# Patient Record
Sex: FEMALE | Race: WHITE
[De-identification: names, ages, dates, MRNs, and addresses within clinical notes are randomized per-mention and may not be internally consistent; named-entity substitution may affect disease eponyms.]

---

## 2017-04-25 PROBLEM — Z00.00 ENCOUNTER FOR PREVENTIVE HEALTH EXAMINATION: Status: ACTIVE | Noted: 2017-04-25

## 2017-04-28 ENCOUNTER — APPOINTMENT (OUTPATIENT)
Dept: RADIOLOGY | Facility: CLINIC | Age: 82
End: 2017-04-28

## 2017-04-28 ENCOUNTER — OUTPATIENT (OUTPATIENT)
Dept: OUTPATIENT SERVICES | Facility: HOSPITAL | Age: 82
LOS: 1 days | End: 2017-04-28
Payer: MEDICARE

## 2017-04-28 DIAGNOSIS — Z78.0 ASYMPTOMATIC MENOPAUSAL STATE: ICD-10-CM

## 2017-04-28 DIAGNOSIS — M85.89 OTHER SPECIFIED DISORDERS OF BONE DENSITY AND STRUCTURE, MULTIPLE SITES: ICD-10-CM

## 2017-04-28 PROCEDURE — 77080 DXA BONE DENSITY AXIAL: CPT | Mod: 26

## 2017-05-19 ENCOUNTER — APPOINTMENT (OUTPATIENT)
Dept: MRI IMAGING | Facility: CLINIC | Age: 82
End: 2017-05-19

## 2017-05-19 ENCOUNTER — OUTPATIENT (OUTPATIENT)
Dept: OUTPATIENT SERVICES | Facility: HOSPITAL | Age: 82
LOS: 1 days | End: 2017-05-19

## 2017-10-13 ENCOUNTER — APPOINTMENT (OUTPATIENT)
Dept: MRI IMAGING | Facility: CLINIC | Age: 82
End: 2017-10-13
Payer: MEDICARE

## 2017-10-13 ENCOUNTER — OUTPATIENT (OUTPATIENT)
Dept: OUTPATIENT SERVICES | Facility: HOSPITAL | Age: 82
LOS: 1 days | End: 2017-10-13
Payer: MEDICARE

## 2017-10-13 PROCEDURE — 73721 MRI JNT OF LWR EXTRE W/O DYE: CPT | Mod: 26,RT

## 2017-10-13 PROCEDURE — 73721 MRI JNT OF LWR EXTRE W/O DYE: CPT

## 2019-01-19 ENCOUNTER — EMERGENCY (EMERGENCY)
Facility: HOSPITAL | Age: 84
LOS: 1 days | Discharge: HOME | End: 2019-01-19
Admitting: EMERGENCY MEDICINE
Payer: MEDICARE

## 2019-01-19 VITALS
RESPIRATION RATE: 18 BRPM | DIASTOLIC BLOOD PRESSURE: 84 MMHG | TEMPERATURE: 98 F | SYSTOLIC BLOOD PRESSURE: 134 MMHG | OXYGEN SATURATION: 98 % | WEIGHT: 149.03 LBS | HEART RATE: 95 BPM

## 2019-01-19 VITALS
DIASTOLIC BLOOD PRESSURE: 74 MMHG | RESPIRATION RATE: 18 BRPM | HEART RATE: 77 BPM | OXYGEN SATURATION: 98 % | SYSTOLIC BLOOD PRESSURE: 122 MMHG

## 2019-01-19 DIAGNOSIS — Z88.8 ALLERGY STATUS TO OTHER DRUGS, MEDICAMENTS AND BIOLOGICAL SUBSTANCES: ICD-10-CM

## 2019-01-19 DIAGNOSIS — Z91.018 ALLERGY TO OTHER FOODS: ICD-10-CM

## 2019-01-19 DIAGNOSIS — Z88.1 ALLERGY STATUS TO OTHER ANTIBIOTIC AGENTS STATUS: ICD-10-CM

## 2019-01-19 DIAGNOSIS — E03.9 HYPOTHYROIDISM, UNSPECIFIED: ICD-10-CM

## 2019-01-19 DIAGNOSIS — I10 ESSENTIAL (PRIMARY) HYPERTENSION: ICD-10-CM

## 2019-01-19 DIAGNOSIS — R19.7 DIARRHEA, UNSPECIFIED: ICD-10-CM

## 2019-01-19 DIAGNOSIS — R10.32 LEFT LOWER QUADRANT PAIN: ICD-10-CM

## 2019-01-19 LAB
ALBUMIN SERPL ELPH-MCNC: 3.5 G/DL — SIGNIFICANT CHANGE UP (ref 3.4–5)
ALP SERPL-CCNC: 108 U/L — SIGNIFICANT CHANGE UP (ref 40–120)
ALT FLD-CCNC: 28 U/L — SIGNIFICANT CHANGE UP (ref 12–42)
ANION GAP SERPL CALC-SCNC: 9 MMOL/L — SIGNIFICANT CHANGE UP (ref 9–16)
APPEARANCE UR: CLEAR — SIGNIFICANT CHANGE UP
AST SERPL-CCNC: 24 U/L — SIGNIFICANT CHANGE UP (ref 15–37)
BASOPHILS NFR BLD AUTO: 0.7 % — SIGNIFICANT CHANGE UP (ref 0–2)
BILIRUB SERPL-MCNC: 0.5 MG/DL — SIGNIFICANT CHANGE UP (ref 0.2–1.2)
BILIRUB UR-MCNC: NEGATIVE — SIGNIFICANT CHANGE UP
BUN SERPL-MCNC: 19 MG/DL — SIGNIFICANT CHANGE UP (ref 7–23)
CALCIUM SERPL-MCNC: 9 MG/DL — SIGNIFICANT CHANGE UP (ref 8.5–10.5)
CHLORIDE SERPL-SCNC: 108 MMOL/L — SIGNIFICANT CHANGE UP (ref 96–108)
CO2 SERPL-SCNC: 24 MMOL/L — SIGNIFICANT CHANGE UP (ref 22–31)
COLOR SPEC: YELLOW — SIGNIFICANT CHANGE UP
CREAT SERPL-MCNC: 0.78 MG/DL — SIGNIFICANT CHANGE UP (ref 0.5–1.3)
DIFF PNL FLD: NEGATIVE — SIGNIFICANT CHANGE UP
EOSINOPHIL NFR BLD AUTO: 2.1 % — SIGNIFICANT CHANGE UP (ref 0–6)
GLUCOSE SERPL-MCNC: 103 MG/DL — HIGH (ref 70–99)
GLUCOSE UR QL: NEGATIVE — SIGNIFICANT CHANGE UP
HCT VFR BLD CALC: 41 % — SIGNIFICANT CHANGE UP (ref 34.5–45)
HGB BLD-MCNC: 13.2 G/DL — SIGNIFICANT CHANGE UP (ref 11.5–15.5)
IMM GRANULOCYTES NFR BLD AUTO: 0.1 % — SIGNIFICANT CHANGE UP (ref 0–1.5)
KETONES UR-MCNC: NEGATIVE — SIGNIFICANT CHANGE UP
LEUKOCYTE ESTERASE UR-ACNC: NEGATIVE — SIGNIFICANT CHANGE UP
LIDOCAIN IGE QN: 201 U/L — SIGNIFICANT CHANGE UP (ref 73–393)
LYMPHOCYTES # BLD AUTO: 12.9 % — LOW (ref 13–44)
MAGNESIUM SERPL-MCNC: 2 MG/DL — SIGNIFICANT CHANGE UP (ref 1.6–2.6)
MCHC RBC-ENTMCNC: 30.6 PG — SIGNIFICANT CHANGE UP (ref 27–34)
MCHC RBC-ENTMCNC: 32.2 G/DL — SIGNIFICANT CHANGE UP (ref 32–36)
MCV RBC AUTO: 94.9 FL — SIGNIFICANT CHANGE UP (ref 80–100)
MONOCYTES NFR BLD AUTO: 10.4 % — SIGNIFICANT CHANGE UP (ref 2–14)
NEUTROPHILS NFR BLD AUTO: 73.8 % — SIGNIFICANT CHANGE UP (ref 43–77)
NITRITE UR-MCNC: NEGATIVE — SIGNIFICANT CHANGE UP
PH UR: 6.5 — SIGNIFICANT CHANGE UP (ref 5–8)
PLATELET # BLD AUTO: 216 K/UL — SIGNIFICANT CHANGE UP (ref 150–400)
POTASSIUM SERPL-MCNC: 4.1 MMOL/L — SIGNIFICANT CHANGE UP (ref 3.5–5.3)
POTASSIUM SERPL-SCNC: 4.1 MMOL/L — SIGNIFICANT CHANGE UP (ref 3.5–5.3)
PROT SERPL-MCNC: 7.1 G/DL — SIGNIFICANT CHANGE UP (ref 6.4–8.2)
PROT UR-MCNC: NEGATIVE MG/DL — SIGNIFICANT CHANGE UP
RBC # BLD: 4.32 M/UL — SIGNIFICANT CHANGE UP (ref 3.8–5.2)
RBC # FLD: 14.3 % — SIGNIFICANT CHANGE UP (ref 10.3–14.5)
SODIUM SERPL-SCNC: 141 MMOL/L — SIGNIFICANT CHANGE UP (ref 132–145)
SP GR SPEC: 1.01 — SIGNIFICANT CHANGE UP (ref 1–1.03)
UROBILINOGEN FLD QL: 0.2 E.U./DL — SIGNIFICANT CHANGE UP
WBC # BLD: 8.2 K/UL — SIGNIFICANT CHANGE UP (ref 3.8–10.5)
WBC # FLD AUTO: 8.2 K/UL — SIGNIFICANT CHANGE UP (ref 3.8–10.5)

## 2019-01-19 PROCEDURE — 99284 EMERGENCY DEPT VISIT MOD MDM: CPT

## 2019-01-19 RX ORDER — SODIUM CHLORIDE 9 MG/ML
1000 INJECTION INTRAMUSCULAR; INTRAVENOUS; SUBCUTANEOUS ONCE
Qty: 0 | Refills: 0 | Status: COMPLETED | OUTPATIENT
Start: 2019-01-19 | End: 2019-01-19

## 2019-01-19 NOTE — ED PROVIDER NOTE - MEDICAL DECISION MAKING DETAILS
appears to be resolving gastroenteritis, asymptomatic now, well appearing, afebrile, vitals reviewed, abdomen soft nontender nondistended, will check labs, consider CT abdomen if abdominal pain returns or abdominal exam changes/abnormal labs, reassess.

## 2019-01-19 NOTE — ED PROVIDER NOTE - NSFOLLOWUPINSTRUCTIONS_ED_ALL_ED_FT
Follow up with your doctor in 1-2 days  Drink plenty of fluids. Rest  Bread, rice, apple sauce, toast. bland diet, advance slowly as tolerated.     RETURN TO THE EMERGENCY DEPARTMENT FOR WORSENING PAIN, FEVER, VOMITING OR ANY CONCERNS.

## 2019-01-19 NOTE — ED ADULT NURSE NOTE - CHPI ED NUR SYMPTOMS NEG
no vomiting/no diarrhea/no burning urination/no chills/no abdominal distension/no blood in stool/no hematuria/no nausea/no fever/no dysuria

## 2019-01-19 NOTE — ED ADULT NURSE NOTE - OBJECTIVE STATEMENT
82 yo c.o LLQ intermittent discomfort for 36 hours. Pt states "I had diarrhea for a couple weeks after marii and tried to change my diet. I haven't had it in 2 days but I feel like when anything moves in my stomach I feel a discomfort in my LLQ. ". Pt states pain started yesterday and she was hoping it would go away but when she woke u this morning it was still there and was worried because of hx of diverticulitis.  Pt is A&Ox3 at this time with no ss of acute distress. Pt denies n/v/fever/chills, chest pain or sob at this time.

## 2019-01-19 NOTE — ED PROVIDER NOTE - OBJECTIVE STATEMENT
83y F with PMHx diverticulitis, hypothyroidism, HTN, Zantac, osteoporosis, intolerance to flagyl, Cipro, and Augmentin, presents to ED c/o intermittent LLQ pain since yesterday. Pt notes she has had diarrhea for a few weeks (~3 episodes/morning, last episode 36 hours ago), which improved with imodium use. Pt states her symptoms do not feel as bad as her diverticulitis flare-ups, but wanted to be evaluated. No hx of abdominal surgeries. Denies fever/chills, nausea/vomiting, bloody stool, melena, changes in appetite.    Medications: Synthroid, ramipril 10mg, aspirin 81mg, Zantac 150mg bid, Prolia injection, vitamins 83y F with PMHx diverticulitis, hypothyroidism, HTN, osteoporosis, Conway's esophagus, intolerance to flagyl, Cipro, and Augmentin, presents to ED c/o intermittent LLQ pain since yesterday. Pt notes she has had diarrhea for a few weeks (~3 episodes/morning, last episode 36 hours ago), which improved with imodium use. Pt states her symptoms do not feel as bad as her diverticulitis flare-ups, but wanted to be evaluated. No hx of abdominal surgeries. Denies fever/chills, nausea/vomiting, bloody stool, melena, changes in appetite.    Medications: Synthroid, ramipril 10mg, aspirin 81mg, Zantac 150mg bid, Prolia injection, vitamins 83y F with PMHx diverticulitis in the past, hypothyroidism, HTN, osteoporosis, Conway's esophagus, intolerance to flagyl, Cipro, and Augmentin, presents to ED c/o mild LLQ pain yesterday that has since resolved. Pt notes she had mild watery diarrhea for 2 weeks, resolved 36 hours ago. Pt states her symptoms do not feel as severe as diverticulitis in the past but wanted to be evaluated. No hx of abdominal surgeries. Denies fever/chills, nausea/vomiting, bloody stool, melena or poor appetite. No recent antibiotic use or recent travel. Used to work as RN at [employer]    Medications: Synthroid, ramipril 10mg, aspirin 81mg, Zantac 150mg bid, Prolia injection, vitamins

## 2019-01-19 NOTE — ED PROVIDER NOTE - PROGRESS NOTE DETAILS
patient feeling better, tolerating PO, labs unremarkable, afebrile, normal white count, tolerating PO.   probable resolving gastroenteritis, patient wishing to hold off on CT abdomen at this time which is reasonable as diarrhea resolving with no abdominal pain anymore, possibly resolving viral gastroenteritis, discussed at length strict return precautions including worsening abdominal pain, vomiting, fever or any concerns, she will follow up with her pmd in 1-2 days, will dc

## 2019-01-19 NOTE — ED PROVIDER NOTE - PHYSICAL EXAMINATION
VITAL SIGNS: I have reviewed nursing notes and confirm.  CONSTITUTIONAL: Well-developed; well-nourished; in no acute distress.  SKIN: Skin is warm and dry, no acute rash.  HEAD: Normocephalic; atraumatic.  EYES: PERRL, EOM intact; conjunctiva and sclera clear.  ENT: No nasal discharge; airway clear.  NECK: Supple; non tender.  CARD: S1, S2 normal; no murmurs, gallops, or rubs. Regular rate and rhythm.  RESP: No wheezes, rales or rhonchi.  ABD: Normal bowel sounds; soft; non-distended; non-tender; no hepatosplenomegaly.  EXT: Normal ROM. No clubbing, cyanosis or edema.  NEURO: Alert, oriented. Grossly unremarkable.  PSYCH: Cooperative, appropriate. VITAL SIGNS: I have reviewed nursing notes and confirm.  CONSTITUTIONAL: Well-developed; well-nourished; in no acute distress.  SKIN: Skin is warm and dry, no acute rash.  HEAD: Normocephalic; atraumatic.  EYES: PERRL, EOM intact; conjunctiva and sclera clear.  ENT: No nasal discharge; airway clear.  NECK: Supple; non tender.  CARD: S1, S2 normal; no murmurs, gallops, or rubs. Regular rate and rhythm.  RESP: No wheezes, rales or rhonchi.  ABD: Normal bowel sounds; soft; non-distended; non-tender. no cvat bilaterally  EXT: Normal ROM x 4.  NEURO: Alert, oriented. Grossly unremarkable.  PSYCH: Cooperative, appropriate.

## 2019-11-19 ENCOUNTER — EMERGENCY (EMERGENCY)
Facility: HOSPITAL | Age: 84
LOS: 1 days | Discharge: HOME | End: 2019-11-19
Attending: EMERGENCY MEDICINE | Admitting: EMERGENCY MEDICINE
Payer: MEDICARE

## 2019-11-19 VITALS
HEART RATE: 94 BPM | SYSTOLIC BLOOD PRESSURE: 138 MMHG | RESPIRATION RATE: 17 BRPM | DIASTOLIC BLOOD PRESSURE: 77 MMHG | WEIGHT: 147.93 LBS | HEIGHT: 65 IN | TEMPERATURE: 98 F | OXYGEN SATURATION: 96 %

## 2019-11-19 DIAGNOSIS — Z98.41 CATARACT EXTRACTION STATUS, RIGHT EYE: Chronic | ICD-10-CM

## 2019-11-19 PROCEDURE — 70450 CT HEAD/BRAIN W/O DYE: CPT | Mod: 26

## 2019-11-19 PROCEDURE — 73564 X-RAY EXAM KNEE 4 OR MORE: CPT | Mod: 26,LT

## 2019-11-19 PROCEDURE — 99284 EMERGENCY DEPT VISIT MOD MDM: CPT

## 2019-11-19 PROCEDURE — 73080 X-RAY EXAM OF ELBOW: CPT | Mod: 26,LT

## 2019-11-19 PROCEDURE — 71101 X-RAY EXAM UNILAT RIBS/CHEST: CPT | Mod: 26,LT

## 2019-11-19 RX ORDER — OXYCODONE AND ACETAMINOPHEN 5; 325 MG/1; MG/1
1 TABLET ORAL ONCE
Refills: 0 | Status: DISCONTINUED | OUTPATIENT
Start: 2019-11-19 | End: 2019-11-19

## 2019-11-19 RX ORDER — ACETAMINOPHEN 500 MG
975 TABLET ORAL ONCE
Refills: 0 | Status: COMPLETED | OUTPATIENT
Start: 2019-11-19 | End: 2019-11-19

## 2019-11-19 RX ORDER — AZTREONAM 2 G
1 VIAL (EA) INJECTION
Qty: 14 | Refills: 0
Start: 2019-11-19 | End: 2019-11-25

## 2019-11-19 RX ADMIN — OXYCODONE AND ACETAMINOPHEN 1 TABLET(S): 5; 325 TABLET ORAL at 19:29

## 2019-11-19 RX ADMIN — Medication 975 MILLIGRAM(S): at 18:20

## 2019-11-19 RX ADMIN — Medication 975 MILLIGRAM(S): at 17:02

## 2019-11-19 NOTE — ED PROVIDER NOTE - SKIN, MLM
+Superficial abrasion over the left knee. no active bleeding. +ecchymosis at medial conidial. +Abrasion over left olecranon.
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
## 2019-11-19 NOTE — ED PROVIDER NOTE - DIAGNOSTIC INTERPRETATION
XR L knee no acute fracture, (+) mild soft tissue swelling noted, normal bony alignment (+) OA   XR L elbow no acute fracture, no soft tissue swelling noted, normal bony alignment (+) OA   CXR no acute infiltrate, heart shadow normal, bony structures intact  XR L ribs no acute fracture, normal bony alignment
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
## 2019-11-19 NOTE — ED PROVIDER NOTE - PATIENT PORTAL LINK FT
You can access the FollowMyHealth Patient Portal offered by [facility] by registering at the following website: [email or web address]. By joining [employer]’s FollowMyHealth portal, you will also be able to view your health information using other applications (apps) compatible with our system.

## 2019-11-19 NOTE — ED PROVIDER NOTE - CARE PLAN
Principal Discharge DX:	Accidental fall, initial encounter  Secondary Diagnosis:	Knee contusion  Secondary Diagnosis:	Elbow contusion  Secondary Diagnosis:	Minor head injury, initial encounter

## 2019-11-19 NOTE — ED ADULT NURSE NOTE - NSIMPLEMENTINTERV_GEN_ALL_ED
Implemented All Universal Safety Interventions:  [place] to call system. Call bell, personal items and telephone within reach. Instruct patient to call for assistance. Room bathroom lighting operational. Non-slip footwear when patient is off stretcher. Physically safe environment: no spills, clutter or unnecessary equipment. Stretcher in lowest position, wheels locked, appropriate side rails in place.

## 2019-11-19 NOTE — ED PROVIDER NOTE - MUSCULOSKELETAL, MLM
Spine appears normal, range of motion is not limited, +tenderness on the left 8th rib. ROM of extremities intact.

## 2019-11-19 NOTE — ED PROVIDER NOTE - NSFOLLOWUPINSTRUCTIONS_ED_ALL_ED_FT
Closed Head Injury    A closed head injury is an injury to your head that may or may not involve a traumatic brain injury (TBI). Symptoms of TBI can be short or long lasting and include headache, dizziness, interference with memory or speech, fatigue, confusion, changes in sleep, mood changes, nausea, depression/anxiety, and dulling of senses. Make sure to obtain proper rest which includes getting plenty of sleep, avoiding excessive visual stimulation, and avoiding activities that may cause physical or mental stress. Avoid any situation where there is potential for another head injury, including sports.    SEEK IMMEDIATE MEDICAL CARE IF YOU HAVE ANY OF THE FOLLOWING SYMPTOMS: unusual drowsiness, vomiting, severe dizziness, seizures, lightheadedness, muscular weakness, different pupil sizes, visual changes, or clear or bloody discharge from your ears or nose.    Contusion    A contusion is a deep bruise. Contusions are the result of a blunt injury to tissues and muscle fibers under the skin. The skin overlying the contusion may turn blue, purple, or yellow. Symptoms also include pain and swelling in the injured area.    SEEK IMMEDIATE MEDICAL CARE IF YOU HAVE ANY OF THE FOLLOWING SYMPTOMS: severe pain, numbness, tingling, pain, weakness, or skin color/temperature change in any part of your body distal to the injury.

## 2019-11-19 NOTE — ED PROVIDER NOTE - PMH
Conway's esophagus    Depression    Diverticulitis    HTN (hypertension)    Hypothyroidism    Osteoporosis

## 2019-11-20 PROBLEM — E03.9 HYPOTHYROIDISM, UNSPECIFIED: Chronic | Status: ACTIVE | Noted: 2019-01-19

## 2019-11-20 PROBLEM — I10 ESSENTIAL (PRIMARY) HYPERTENSION: Chronic | Status: ACTIVE | Noted: 2019-01-19
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
## 2019-11-24 DIAGNOSIS — I10 ESSENTIAL (PRIMARY) HYPERTENSION: ICD-10-CM

## 2019-11-24 DIAGNOSIS — S80.02XA CONTUSION OF LEFT KNEE, INITIAL ENCOUNTER: ICD-10-CM
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
## 2019-11-24 DIAGNOSIS — S09.90XA UNSPECIFIED INJURY OF HEAD, INITIAL ENCOUNTER: ICD-10-CM

## 2019-11-24 DIAGNOSIS — S50.02XA CONTUSION OF LEFT ELBOW, INITIAL ENCOUNTER: ICD-10-CM
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
## 2019-11-24 DIAGNOSIS — R07.81 PLEURODYNIA: ICD-10-CM

## 2019-11-27 NOTE — ED POST DISCHARGE NOTE - DETAILS
pt reports she is still uncomfortable but it is improving significantly and she has been using lidocaine patches. denies any sob. Patient called asking for her xray report and after her identity was verified, she was provided with the information

## 2019-11-27 NOTE — ED POST DISCHARGE NOTE - ADDITIONAL DOCUMENTATION
additionally recommended pt follow up with her pmd with repeat imaging as recommended by radiologist regarding the lung nodules.

## 2020-10-19 NOTE — ED ADULT NURSE NOTE - NSFALLRSKPASTHIST_ED_ALL_ED
Subjective   Sara Solis is a 79 y.o. female. Breast pain    History of Present Illness   Patient presents today for breast pain. Breast pain is located in right breast. Patient says she has been having this pain for few few months. Pain occurs intermittently. She had mammogram and ultrasound which were normal in the past two months. She denies any breast lump, adenopathy, breast dimpling, or nipple discharge/bleeding. Today she denies any breast pain.     The following portions of the patient's history were reviewed and updated as appropriate: allergies, current medications, past family history, past medical history, past social history, past surgical history and problem list.    Review of Systems   Constitutional: Negative for chills, fatigue and fever.   HENT: Negative for congestion, ear pain, rhinorrhea and sore throat.    Eyes: Negative for blurred vision, double vision and visual disturbance.   Respiratory: Negative for cough, chest tightness, shortness of breath and wheezing.    Cardiovascular: Negative for chest pain, palpitations and leg swelling.   Gastrointestinal: Negative for abdominal pain, diarrhea, nausea and vomiting.   Endocrine: Negative for cold intolerance and heat intolerance.   Genitourinary: Positive for breast pain. Negative for difficulty urinating, dysuria, frequency and hematuria.   Musculoskeletal: Negative for arthralgias, back pain, neck pain and neck stiffness.   Skin: Negative for dry skin, pallor, rash, skin lesions and bruise.   Allergic/Immunologic: Negative for environmental allergies, food allergies and immunocompromised state.   Neurological: Negative for dizziness, syncope, weakness, light-headedness, headache and confusion.   Hematological: Negative for adenopathy. Does not bruise/bleed easily.   Psychiatric/Behavioral: Negative for self-injury, sleep disturbance, suicidal ideas, depressed mood and stress. The patient is not nervous/anxious.        Objective    Physical Exam  Constitutional:       Appearance: She is well-developed.   HENT:      Head: Normocephalic.      Right Ear: External ear normal.      Left Ear: External ear normal.      Nose: Nose normal.      Mouth/Throat:      Pharynx: No oropharyngeal exudate.   Eyes:      Conjunctiva/sclera: Conjunctivae normal.      Pupils: Pupils are equal, round, and reactive to light.   Neck:      Musculoskeletal: Normal range of motion and neck supple.      Thyroid: No thyromegaly.   Cardiovascular:      Rate and Rhythm: Normal rate and regular rhythm.   Pulmonary:      Effort: Pulmonary effort is normal.      Breath sounds: Normal breath sounds.   Chest:      Breasts:         Right: Normal. No swelling, bleeding, inverted nipple, mass, nipple discharge, skin change or tenderness.         Left: Normal. No swelling, bleeding, inverted nipple, mass, nipple discharge, skin change or tenderness.      Comments: Breast exam performed.  Musculoskeletal: Normal range of motion.   Lymphadenopathy:      Upper Body:      Right upper body: No supraclavicular, axillary or pectoral adenopathy.      Left upper body: No supraclavicular, axillary or pectoral adenopathy.   Skin:     General: Skin is warm and dry.   Neurological:      Mental Status: She is alert and oriented to person, place, and time.   Psychiatric:         Behavior: Behavior normal.         Thought Content: Thought content normal.         Judgment: Judgment normal.       Vitals:    10/19/20 1000   BP: 144/62   Pulse: 82   Temp: 97.3 °F (36.3 °C)   SpO2: 97%         Assessment/Plan   Diagnoses and all orders for this visit:    1. Breast pain (Primary)  -     vitamin E (vitamin E) 400 UNIT capsule; Take 1 capsule by mouth 2 (Two) Times a Day.  Dispense: 60 capsule; Refill: 0    Breast exam unremarkable. Pt had tenderness around 9 o'clock to 12 o'clock area, no lump, dimpling noted. Pt instructed to stop drinking all caffeine, including eating chocolate, ice area PRN for pain,  tylenol as directed PRN and take vitamin E 400 units BID for one month. At one month she was instructed to call and let me know how she is doing. If still no better or worse, I want to order more imaging.  If symptoms do not improve or worsen, patient was instructed to return to clinic for further evaluation.         This document has been electronically signed by DEBORAH Garg on  October 19, 2020 10:35 CDT              yes

## 2023-02-20 NOTE — ED PROVIDER NOTE - CROS ED RESP ALL NEG
Virtual Telephone Check-In    Cecily Castano verbally  a Virtual/Telephone Check-In visit on 02/20/23. Patient has been referred to the [facility] website at [email or web address] to review the yearly Consent to Treat document. Patient understands and accepts financial responsibility for any deductible, co-insurance and/or co-pays associated with this service.     Duration of the service:  5 minutes      Summary of topics discussed:             Kassidy Austin MD
negative...

## 2024-01-22 ENCOUNTER — INPATIENT (INPATIENT)
Facility: HOSPITAL | Age: 89
LOS: 7 days | Discharge: NURSING, REHAB OR LONG-TERM CARE | DRG: 871 | End: 2024-01-30
Attending: STUDENT IN AN ORGANIZED HEALTH CARE EDUCATION/TRAINING PROGRAM | Admitting: INTERNAL MEDICINE
Payer: MEDICARE

## 2024-01-22 VITALS
SYSTOLIC BLOOD PRESSURE: 135 MMHG | RESPIRATION RATE: 18 BRPM | DIASTOLIC BLOOD PRESSURE: 75 MMHG | OXYGEN SATURATION: 97 % | HEART RATE: 90 BPM

## 2024-01-22 DIAGNOSIS — Z98.41 CATARACT EXTRACTION STATUS, RIGHT EYE: Chronic | ICD-10-CM

## 2024-01-22 LAB
ALBUMIN SERPL ELPH-MCNC: 2.7 G/DL — LOW (ref 3.4–5)
ALP SERPL-CCNC: 129 U/L — HIGH (ref 40–120)
ALT FLD-CCNC: 56 U/L — HIGH (ref 12–42)
ANION GAP SERPL CALC-SCNC: 16 MMOL/L — SIGNIFICANT CHANGE UP (ref 9–16)
AST SERPL-CCNC: 216 U/L — HIGH (ref 15–37)
BASOPHILS # BLD AUTO: 0.09 K/UL — SIGNIFICANT CHANGE UP (ref 0–0.2)
BASOPHILS NFR BLD AUTO: 0.3 % — SIGNIFICANT CHANGE UP (ref 0–2)
BILIRUB SERPL-MCNC: 1.2 MG/DL — SIGNIFICANT CHANGE UP (ref 0.2–1.2)
BUN SERPL-MCNC: 51 MG/DL — HIGH (ref 7–23)
CALCIUM SERPL-MCNC: 9 MG/DL — SIGNIFICANT CHANGE UP (ref 8.5–10.5)
CHLORIDE SERPL-SCNC: 114 MMOL/L — HIGH (ref 96–108)
CK SERPL-CCNC: 4434 U/L — HIGH (ref 26–192)
CO2 SERPL-SCNC: 19 MMOL/L — LOW (ref 22–31)
CREAT SERPL-MCNC: 1.28 MG/DL — SIGNIFICANT CHANGE UP (ref 0.5–1.3)
D DIMER BLD IA.RAPID-MCNC: 1148 NG/ML DDU — HIGH
EGFR: 40 ML/MIN/1.73M2 — LOW
EOSINOPHIL # BLD AUTO: 0 K/UL — SIGNIFICANT CHANGE UP (ref 0–0.5)
EOSINOPHIL NFR BLD AUTO: 0 % — SIGNIFICANT CHANGE UP (ref 0–6)
FLUAV AG NPH QL: SIGNIFICANT CHANGE UP
FLUBV AG NPH QL: SIGNIFICANT CHANGE UP
GIANT PLATELETS BLD QL SMEAR: PRESENT — SIGNIFICANT CHANGE UP
GLUCOSE BLDC GLUCOMTR-MCNC: 104 MG/DL — HIGH (ref 70–99)
GLUCOSE SERPL-MCNC: 123 MG/DL — HIGH (ref 70–99)
HCT VFR BLD CALC: 44 % — SIGNIFICANT CHANGE UP (ref 34.5–45)
HGB BLD-MCNC: 14.1 G/DL — SIGNIFICANT CHANGE UP (ref 11.5–15.5)
IMM GRANULOCYTES NFR BLD AUTO: 0.9 % — SIGNIFICANT CHANGE UP (ref 0–0.9)
LACTATE BLDV-MCNC: 3.2 MMOL/L — HIGH (ref 0.5–2)
LACTATE BLDV-MCNC: 3.7 MMOL/L — HIGH (ref 0.5–2)
LG PLATELETS BLD QL AUTO: SLIGHT — SIGNIFICANT CHANGE UP
LYMPHOCYTES # BLD AUTO: 0.88 K/UL — LOW (ref 1–3.3)
LYMPHOCYTES # BLD AUTO: 3.1 % — LOW (ref 13–44)
MANUAL SMEAR VERIFICATION: SIGNIFICANT CHANGE UP
MCHC RBC-ENTMCNC: 32 GM/DL — SIGNIFICANT CHANGE UP (ref 32–36)
MCHC RBC-ENTMCNC: 32.2 PG — SIGNIFICANT CHANGE UP (ref 27–34)
MCV RBC AUTO: 100.5 FL — HIGH (ref 80–100)
MICROCYTES BLD QL: SLIGHT — SIGNIFICANT CHANGE UP
MONOCYTES # BLD AUTO: 1.68 K/UL — HIGH (ref 0–0.9)
MONOCYTES NFR BLD AUTO: 6 % — SIGNIFICANT CHANGE UP (ref 2–14)
NEUTROPHILS # BLD AUTO: 25.19 K/UL — HIGH (ref 1.8–7.4)
NEUTROPHILS NFR BLD AUTO: 89.7 % — HIGH (ref 43–77)
NRBC # BLD: 0 /100 WBCS — SIGNIFICANT CHANGE UP (ref 0–0)
NT-PROBNP SERPL-SCNC: 2629 PG/ML — HIGH
PLAT MORPH BLD: ABNORMAL
PLATELET # BLD AUTO: 234 K/UL — SIGNIFICANT CHANGE UP (ref 150–400)
POTASSIUM SERPL-MCNC: 3.9 MMOL/L — SIGNIFICANT CHANGE UP (ref 3.5–5.3)
POTASSIUM SERPL-SCNC: 3.9 MMOL/L — SIGNIFICANT CHANGE UP (ref 3.5–5.3)
PROT SERPL-MCNC: 6.4 G/DL — SIGNIFICANT CHANGE UP (ref 6.4–8.2)
RBC # BLD: 4.38 M/UL — SIGNIFICANT CHANGE UP (ref 3.8–5.2)
RBC # FLD: 15.9 % — HIGH (ref 10.3–14.5)
RBC BLD AUTO: ABNORMAL
RSV RNA NPH QL NAA+NON-PROBE: SIGNIFICANT CHANGE UP
SARS-COV-2 RNA SPEC QL NAA+PROBE: SIGNIFICANT CHANGE UP
SODIUM SERPL-SCNC: 149 MMOL/L — HIGH (ref 132–145)
TROPONIN I, HIGH SENSITIVITY RESULT: 190.5 NG/L — HIGH
TROPONIN I, HIGH SENSITIVITY RESULT: 212.6 NG/L — HIGH
TSH SERPL-MCNC: 8.73 UIU/ML — HIGH (ref 0.36–3.74)
WBC # BLD: 28.09 K/UL — HIGH (ref 3.8–10.5)
WBC # FLD AUTO: 28.09 K/UL — HIGH (ref 3.8–10.5)

## 2024-01-22 PROCEDURE — 71275 CT ANGIOGRAPHY CHEST: CPT | Mod: 26,MH

## 2024-01-22 PROCEDURE — 73700 CT LOWER EXTREMITY W/O DYE: CPT | Mod: 26,50,MH

## 2024-01-22 PROCEDURE — 74176 CT ABD & PELVIS W/O CONTRAST: CPT | Mod: 26,MH

## 2024-01-22 PROCEDURE — 99291 CRITICAL CARE FIRST HOUR: CPT

## 2024-01-22 PROCEDURE — 72125 CT NECK SPINE W/O DYE: CPT | Mod: 26,MH

## 2024-01-22 PROCEDURE — 71250 CT THORAX DX C-: CPT | Mod: 26,59,MH

## 2024-01-22 PROCEDURE — 70450 CT HEAD/BRAIN W/O DYE: CPT | Mod: 26,MH

## 2024-01-22 RX ORDER — METOPROLOL TARTRATE 50 MG
2.5 TABLET ORAL ONCE
Refills: 0 | Status: DISCONTINUED | OUTPATIENT
Start: 2024-01-22 | End: 2024-01-24

## 2024-01-22 RX ORDER — SODIUM CHLORIDE 9 MG/ML
1000 INJECTION INTRAMUSCULAR; INTRAVENOUS; SUBCUTANEOUS ONCE
Refills: 0 | Status: COMPLETED | OUTPATIENT
Start: 2024-01-22 | End: 2024-01-22

## 2024-01-22 RX ORDER — ASPIRIN/CALCIUM CARB/MAGNESIUM 324 MG
324 TABLET ORAL ONCE
Refills: 0 | Status: COMPLETED | OUTPATIENT
Start: 2024-01-22 | End: 2024-01-22

## 2024-01-22 RX ADMIN — Medication 0.5 MILLIGRAM(S): at 22:08

## 2024-01-22 RX ADMIN — SODIUM CHLORIDE 1000 MILLILITER(S): 9 INJECTION INTRAMUSCULAR; INTRAVENOUS; SUBCUTANEOUS at 22:06

## 2024-01-22 RX ADMIN — Medication 324 MILLIGRAM(S): at 19:27

## 2024-01-22 RX ADMIN — SODIUM CHLORIDE 250 MILLILITER(S): 9 INJECTION INTRAMUSCULAR; INTRAVENOUS; SUBCUTANEOUS at 18:25

## 2024-01-22 NOTE — ED ADULT TRIAGE NOTE - CHIEF COMPLAINT QUOTE
pt. found lying on her apartment, as per EMS a friend in the building had not heard from her and went to check on her. Pt. reports she was reaching for something on her floor, fell and could not get up, unclear when the fall was and she is unable to elaborate. Pt. noted to have bruising to the right hip, left hand/wrist, as well as bruising in other various parts of her body. Unable to obtain temp in triage pt. can not tolerate.

## 2024-01-22 NOTE — ED ADULT NURSE NOTE - OBJECTIVE STATEMENT
pt. found lying on her apartment, as per EMS a friend in the building had not heard from her and went to check on her. Pt. reports she was reaching for something on her floor, fell and could not get up, unclear when the fall was and she is unable to elaborate. Pt. noted to have bruising to the right hip, left hand/wrist, as well as bruising in other various parts of her body.

## 2024-01-22 NOTE — PROVIDER CONTACT NOTE (EICU) - ASSESSMENT
Awake alert responsive, answers questions but not entirely appropriately. Protecting airway.  Pertinent labs include Na 149 (>1.5L free water deficit) with hyperchloremic metabolic acidosis (Cl 114 bicarb 19) and KASEY (51/1.28). mild transaminitis, first troponin positive (212) and elevation in CPK (4434)

## 2024-01-22 NOTE — ED PROVIDER NOTE - NEUROLOGICAL, MLM
awake speech is slow and approprate follows commands able to hld left bar of bed with right arm and pull self to left side of body for exam of back motor 4/5 all extremities cn 3-8 normal  and oriented, no focal deficits, no motor or sensory deficits.

## 2024-01-22 NOTE — ED PROVIDER NOTE - NS ED MD DISPO ISOLATION TYPES
Nosebleeds Normal Treatment: I explained this is common when taking isotretinoin. I recommended saline mist in each nostril multiple times a day. If this worsens they will contact us. None

## 2024-01-22 NOTE — ED ADULT NURSE REASSESSMENT NOTE - NS ED NURSE REASSESS COMMENT FT1
Patient to ER bed 5 to gown for evaluation. Side rails up. pt remains on CCM and pulse oximetry. resting in stretcher, even rise and fall of chest. NAD, family members arrive, pt's niece and nephew from [place].

## 2024-01-22 NOTE — ED PROVIDER NOTE - SKIN, MLM
Skin normal color for race, warm, dry and intact. multiple ecchymosis to extremities right hip lower back arms that appears 2 + days old  no signs of ulceration

## 2024-01-22 NOTE — ED PROVIDER NOTE - OBJECTIVE STATEMENT
84 y o female with PMHx of glaucoma (Latanoprost), hypothyroidism, depression, HTN (Lamapril), ASA 81mg QOD, osteoporosis, and hx of Right eye cataract history as per EMS, pt and old gv charts reviewed    84 y o female with PMHx of diverticulitis, jensen's esophagus,  glaucoma (Latanoprost), hypothyroidism, depression, HTN (Lamapril), - ems called by superintendant - as it was notted that patient had not picked up newspapers x 2 days. super found pt awake lying on the floor of her apt generally to wek to get off floor, EMS found patient in smialr condition and trasnportend her to ER. Pt lives alone.     The patient denies the following symptoms:  headache, dizziness/lightheadedness, neck pain/neck stiffness, numbness/tingling, photophobia, change in vision/hearing/gait/mental status/speech,difficulty swallowing, focal weakness,, fever, chills, chest/neck/jaw/arm or back pain, palpitations, shortness of breath, diaphoresis, swelling to arm and/or legs, N/V/D, abdominal pain, abdominal distention, back pain, flank pain, EMERGENCY CONTACT SHANICE HANEY   [phone]     history as per EMS, pt and old [facility] charts reviewed    84 y o female with PMHx of diverticulitis, jensen's esophagus,  glaucoma (Latanoprost), hypothyroidism, depression, HTN (Lamapril), - ems called by superintendant - as it was notted that patient had not picked up newspapers x 2 days. super found pt awake lying on the floor of her apt generally to wek to get off floor, EMS found patient in smialr condition and trasnportend her to ER. Pt lives alone.     The patient denies the following symptoms:  headache, dizziness/lightheadedness, neck pain/neck stiffness, numbness/tingling, photophobia, change in vision/hearing/gait/mental status/speech,difficulty swallowing, focal weakness,, fever, chills, chest/neck/jaw/arm or back pain, palpitations, shortness of breath, diaphoresis, swelling to arm and/or legs, N/V/D, abdominal pain, abdominal distention, back pain, flank pain, EMERGENCY CONTACT SHANICE HANEY   [phone]     history as per EMS, pt and old [facility] charts reviewed    84 y o female with PMHx of diverticulitis, jensen's esophagus,  glaucoma (Latanoprost), hypothyroidism, depression, HTN (Lamapril), - ems called by superintendant - as it was noted that patient had not picked up newspapers x 2 days. super found pt awake lying on the floor of her apt generally to weak to get off floor, EMS found patient in similar condition and transported her to ER. Pt lives alone.     The patient denies the following symptoms:  headache, dizziness/lightheadedness, neck pain/neck stiffness, numbness/tingling, photophobia, change in vision/hearing/gait/mental status/speech,difficulty swallowing, focal weakness,, fever, chills, chest/neck/jaw/arm or back pain, palpitations, shortness of breath, diaphoresis, swelling to arm and/or legs, N/V/D, abdominal pain, abdominal distention, back pain, flank pain,

## 2024-01-22 NOTE — ED PROVIDER NOTE - CLINICAL SUMMARY MEDICAL DECISION MAKING FREE TEXT BOX
84 y o female with PMHx of diverticulitis, jensen's esophagus,  glaucoma (Latanoprost), hypothyroidism, depression, HTN (Lamapril), - ems called by superintendant - as it was noted that patient had not picked up newspapers x 2 days. super found pt awake lying on the floor of her apt generally to weak to get off floor, EMS found patient in similar condition and transported her to ER. Pt lives alone.     iv labs meds ct scan  labs ct scan results reviewed  @ 8pm I spoke with jeffrey thomas  pt w afib on ekg and complaint of mild sob -  will check d dimer and ct angio for pe if elevated   @ 945pm I spoke with jeffrey thomas - who will come to ER 84 y o female with PMHx of diverticulitis, jensen's esophagus,  glaucoma (Latanoprost), hypothyroidism, depression, HTN (Lamapril), - ems called by superintendant - as it was noted that patient had not picked up newspapers x 2 days. super found pt awake lying on the floor of her apt generally to weak to get off floor, EMS found patient in similar condition and transported her to ER. Pt lives alone.     iv labs meds ct scan  labs ct scan results reviewed  @ 8pm I spoke with jeffrey thomas  pt w afib on ekg and complaint of mild sob -  will check d dimer and ct angio for pe if elevated   trop #2 downtrending   @ 945pm I spoke with jeffrey thomas - who will come to ER 84 y o female with PMHx of diverticulitis, jensen's esophagus,  glaucoma (Latanoprost), hypothyroidism, depression, HTN (Lamapril), - ems called by superintendant - as it was noted that patient had not picked up newspapers x 2 days. super found pt awake lying on the floor of her apt generally to weak to get off floor, EMS found patient in similar condition and transported her to ER. Pt lives alone.     iv labs meds ct scan  labs ct scan results reviewed  @ 8pm I spoke with jeffrey thomas  pt w afib on ekg and complaint of mild sob -  will check d dimer and ct angio for pe if elevated   trop #2 downtrending   @ 945pm I spoke with jeffrey thomas - who will come to ER  @1030-pm son and niece present in the ER   @1145pm case dw dr dempsey - plan for t4, ivf and 2.5 mg metoprolol for rate control of afib.

## 2024-01-22 NOTE — ED PROVIDER NOTE - CRITICAL CARE ATTENDING CONTRIBUTION TO CARE
The patient was seen immediately upon arrival due to a high probability of imminent or life-threatening deterioration secondary to sepsis dehydration rhabdomyolysis and afib , which required my direct attention, intervention, and personal management at the bedside. I have personally provided critical care time exclusive of time spent on separately billable procedures. Time includes review of laboratory data, radiology results, discussion with consultants, discussion with the patient's family, and monitoring for potential decompensation.

## 2024-01-22 NOTE — ED ADULT NURSE NOTE - NSFALLRISKINTERV_ED_ALL_ED

## 2024-01-22 NOTE — ED PROVIDER NOTE - CARE PLAN
1 Principal Discharge DX:	Dehydration  Secondary Diagnosis:	Sepsis  Secondary Diagnosis:	Atrial fibrillation  Secondary Diagnosis:	Rhabdomyolysis

## 2024-01-22 NOTE — ED ADULT NURSE NOTE - PAIN RATING/NUMBER SCALE (0-10): ACTIVITY
Now pain subsided. At its worst, it is a DULL pain/ache; not sharp or stabbing. As long as he doesn't stand on his leg, it won't hurt Area not warm to touch.  Continues to be very tired after discharge from hospital.    Took 400mg of gabapentin last night a 0 (no pain/absence of nonverbal indicators of pain)

## 2024-01-22 NOTE — PROVIDER CONTACT NOTE (EICU) - RECOMMENDATIONS
- Ensure adequate hydration (while BNP elevated, patient is definitely dehydrated, and is saturating well on room air). Would give at least one additional liter of LR. If patient can pass bedside speech / swallow, would recommend additional PO intake of water.  - TSH elevated, but no evidence of myxedema coma / profound hypothyroidism (awake alert hypertensive tachycardic). Can get free T4 levels.  - Agree with empiric antibiotics, F/U U/A, UCx, BCx.

## 2024-01-22 NOTE — PROVIDER CONTACT NOTE (EICU) - BACKGROUND
88F (lives alone) PMH hypertension hypothyroidism found down in her apartment, possibly up to two days (last known). Brought to ED for further evaluation.  Weakness, bruises throughout body. Currently /76 spO2 98% [RA] HR 120s.

## 2024-01-23 DIAGNOSIS — I49.3 VENTRICULAR PREMATURE DEPOLARIZATION: ICD-10-CM

## 2024-01-23 DIAGNOSIS — R74.8 ABNORMAL LEVELS OF OTHER SERUM ENZYMES: ICD-10-CM

## 2024-01-23 DIAGNOSIS — G92.8 OTHER TOXIC ENCEPHALOPATHY: ICD-10-CM
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
## 2024-01-23 DIAGNOSIS — E87.29 OTHER ACIDOSIS: ICD-10-CM

## 2024-01-23 DIAGNOSIS — E03.9 HYPOTHYROIDISM, UNSPECIFIED: ICD-10-CM

## 2024-01-23 DIAGNOSIS — H40.9 UNSPECIFIED GLAUCOMA: ICD-10-CM

## 2024-01-23 DIAGNOSIS — A41.9 SEPSIS, UNSPECIFIED ORGANISM: ICD-10-CM

## 2024-01-23 DIAGNOSIS — R58 HEMORRHAGE, NOT ELSEWHERE CLASSIFIED: ICD-10-CM

## 2024-01-23 DIAGNOSIS — Z29.9 ENCOUNTER FOR PROPHYLACTIC MEASURES, UNSPECIFIED: ICD-10-CM

## 2024-01-23 DIAGNOSIS — R79.89 OTHER SPECIFIED ABNORMAL FINDINGS OF BLOOD CHEMISTRY: ICD-10-CM

## 2024-01-23 DIAGNOSIS — R65.10 SYSTEMIC INFLAMMATORY RESPONSE SYNDROME (SIRS) OF NON-INFECTIOUS ORIGIN WITHOUT ACUTE ORGAN DYSFUNCTION: ICD-10-CM

## 2024-01-23 DIAGNOSIS — R13.10 DYSPHAGIA, UNSPECIFIED: ICD-10-CM

## 2024-01-23 DIAGNOSIS — R74.01 ELEVATION OF LEVELS OF LIVER TRANSAMINASE LEVELS: ICD-10-CM

## 2024-01-23 DIAGNOSIS — R55 SYNCOPE AND COLLAPSE: ICD-10-CM

## 2024-01-23 DIAGNOSIS — I48.91 UNSPECIFIED ATRIAL FIBRILLATION: ICD-10-CM

## 2024-01-23 DIAGNOSIS — E87.0 HYPEROSMOLALITY AND HYPERNATREMIA: ICD-10-CM

## 2024-01-23 DIAGNOSIS — I10 ESSENTIAL (PRIMARY) HYPERTENSION: ICD-10-CM

## 2024-01-23 DIAGNOSIS — C90.00 MULTIPLE MYELOMA NOT HAVING ACHIEVED REMISSION: ICD-10-CM

## 2024-01-23 LAB
ALBUMIN SERPL ELPH-MCNC: 2.9 G/DL — LOW (ref 3.3–5)
ALP SERPL-CCNC: 119 U/L — SIGNIFICANT CHANGE UP (ref 40–120)
ALT FLD-CCNC: 49 U/L — HIGH (ref 10–45)
AMPHET UR-MCNC: NEGATIVE — SIGNIFICANT CHANGE UP
ANION GAP SERPL CALC-SCNC: 12 MMOL/L — SIGNIFICANT CHANGE UP (ref 5–17)
ANION GAP SERPL CALC-SCNC: 16 MMOL/L — SIGNIFICANT CHANGE UP (ref 5–17)
ANION GAP SERPL CALC-SCNC: 17 MMOL/L — SIGNIFICANT CHANGE UP (ref 5–17)
ANION GAP SERPL CALC-SCNC: 19 MMOL/L — HIGH (ref 5–17)
ANISOCYTOSIS BLD QL: SIGNIFICANT CHANGE UP
APAP SERPL-MCNC: <5 UG/ML — LOW (ref 10–30)
APPEARANCE UR: CLEAR — SIGNIFICANT CHANGE UP
APTT BLD: 23.5 SEC — LOW (ref 24.5–35.6)
AST SERPL-CCNC: 160 U/L — HIGH (ref 10–40)
BACTERIA # UR AUTO: ABNORMAL /HPF
BARBITURATES UR SCN-MCNC: NEGATIVE — SIGNIFICANT CHANGE UP
BASOPHILS # BLD AUTO: 0 K/UL — SIGNIFICANT CHANGE UP (ref 0–0.2)
BASOPHILS NFR BLD AUTO: 0 % — SIGNIFICANT CHANGE UP (ref 0–2)
BENZODIAZ UR-MCNC: NEGATIVE — SIGNIFICANT CHANGE UP
BILIRUB SERPL-MCNC: 0.9 MG/DL — SIGNIFICANT CHANGE UP (ref 0.2–1.2)
BILIRUB UR-MCNC: NEGATIVE — SIGNIFICANT CHANGE UP
BLD GP AB SCN SERPL QL: NEGATIVE — SIGNIFICANT CHANGE UP
BUN SERPL-MCNC: 33 MG/DL — HIGH (ref 7–23)
BUN SERPL-MCNC: 38 MG/DL — HIGH (ref 7–23)
BUN SERPL-MCNC: 38 MG/DL — HIGH (ref 7–23)
BUN SERPL-MCNC: 44 MG/DL — HIGH (ref 7–23)
BURR CELLS BLD QL SMEAR: PRESENT — SIGNIFICANT CHANGE UP
CALCIUM SERPL-MCNC: 8.4 MG/DL — SIGNIFICANT CHANGE UP (ref 8.4–10.5)
CALCIUM SERPL-MCNC: 8.4 MG/DL — SIGNIFICANT CHANGE UP (ref 8.4–10.5)
CALCIUM SERPL-MCNC: 8.8 MG/DL — SIGNIFICANT CHANGE UP (ref 8.4–10.5)
CALCIUM SERPL-MCNC: 8.9 MG/DL — SIGNIFICANT CHANGE UP (ref 8.4–10.5)
CAST: 16 /LPF — HIGH (ref 0–4)
CHLORIDE SERPL-SCNC: 116 MMOL/L — HIGH (ref 96–108)
CHLORIDE SERPL-SCNC: 118 MMOL/L — HIGH (ref 96–108)
CK MB CFR SERPL CALC: 67.1 NG/ML — HIGH (ref 0–6.7)
CK SERPL-CCNC: 3944 U/L — HIGH (ref 25–170)
CK SERPL-CCNC: 5183 U/L — HIGH (ref 25–170)
CO2 SERPL-SCNC: 14 MMOL/L — LOW (ref 22–31)
CO2 SERPL-SCNC: 15 MMOL/L — LOW (ref 22–31)
CO2 SERPL-SCNC: 15 MMOL/L — LOW (ref 22–31)
CO2 SERPL-SCNC: 21 MMOL/L — LOW (ref 22–31)
COCAINE METAB.OTHER UR-MCNC: NEGATIVE — SIGNIFICANT CHANGE UP
COLOR SPEC: YELLOW — SIGNIFICANT CHANGE UP
CREAT ?TM UR-MCNC: 86 MG/DL — SIGNIFICANT CHANGE UP
CREAT SERPL-MCNC: 0.76 MG/DL — SIGNIFICANT CHANGE UP (ref 0.5–1.3)
CREAT SERPL-MCNC: 0.78 MG/DL — SIGNIFICANT CHANGE UP (ref 0.5–1.3)
CREAT SERPL-MCNC: 0.83 MG/DL — SIGNIFICANT CHANGE UP (ref 0.5–1.3)
CREAT SERPL-MCNC: 0.97 MG/DL — SIGNIFICANT CHANGE UP (ref 0.5–1.3)
DIFF PNL FLD: ABNORMAL
EGFR: 56 ML/MIN/1.73M2 — LOW
EGFR: 68 ML/MIN/1.73M2 — SIGNIFICANT CHANGE UP
EGFR: 73 ML/MIN/1.73M2 — SIGNIFICANT CHANGE UP
EGFR: 75 ML/MIN/1.73M2 — SIGNIFICANT CHANGE UP
EOSINOPHIL # BLD AUTO: 0 K/UL — SIGNIFICANT CHANGE UP (ref 0–0.5)
EOSINOPHIL NFR BLD AUTO: 0 % — SIGNIFICANT CHANGE UP (ref 0–6)
FINE GRAN CASTS #/AREA URNS AUTO: PRESENT
GIANT PLATELETS BLD QL SMEAR: PRESENT — SIGNIFICANT CHANGE UP
GLUCOSE SERPL-MCNC: 127 MG/DL — HIGH (ref 70–99)
GLUCOSE SERPL-MCNC: 86 MG/DL — SIGNIFICANT CHANGE UP (ref 70–99)
GLUCOSE SERPL-MCNC: 91 MG/DL — SIGNIFICANT CHANGE UP (ref 70–99)
GLUCOSE SERPL-MCNC: 92 MG/DL — SIGNIFICANT CHANGE UP (ref 70–99)
GLUCOSE UR QL: NEGATIVE MG/DL — SIGNIFICANT CHANGE UP
HCT VFR BLD CALC: 40.9 % — SIGNIFICANT CHANGE UP (ref 34.5–45)
HGB BLD-MCNC: 13.7 G/DL — SIGNIFICANT CHANGE UP (ref 11.5–15.5)
INR BLD: 1.25 — HIGH (ref 0.85–1.18)
KETONES UR-MCNC: 15 MG/DL
LACTATE SERPL-SCNC: 2 MMOL/L — SIGNIFICANT CHANGE UP (ref 0.5–2)
LACTATE SERPL-SCNC: 2.5 MMOL/L — HIGH (ref 0.5–2)
LEUKOCYTE ESTERASE UR-ACNC: NEGATIVE — SIGNIFICANT CHANGE UP
LYMPHOCYTES # BLD AUTO: 1.06 K/UL — SIGNIFICANT CHANGE UP (ref 1–3.3)
LYMPHOCYTES # BLD AUTO: 4.4 % — LOW (ref 13–44)
MACROCYTES BLD QL: SLIGHT — SIGNIFICANT CHANGE UP
MAGNESIUM SERPL-MCNC: 2.2 MG/DL — SIGNIFICANT CHANGE UP (ref 1.6–2.6)
MAGNESIUM SERPL-MCNC: 2.2 MG/DL — SIGNIFICANT CHANGE UP (ref 1.6–2.6)
MANUAL SMEAR VERIFICATION: SIGNIFICANT CHANGE UP
MCHC RBC-ENTMCNC: 32.4 PG — SIGNIFICANT CHANGE UP (ref 27–34)
MCHC RBC-ENTMCNC: 33.5 GM/DL — SIGNIFICANT CHANGE UP (ref 32–36)
MCV RBC AUTO: 96.7 FL — SIGNIFICANT CHANGE UP (ref 80–100)
METHADONE UR-MCNC: NEGATIVE — SIGNIFICANT CHANGE UP
MICROCYTES BLD QL: SLIGHT — SIGNIFICANT CHANGE UP
MONOCYTES # BLD AUTO: 0.63 K/UL — SIGNIFICANT CHANGE UP (ref 0–0.9)
MONOCYTES NFR BLD AUTO: 2.6 % — SIGNIFICANT CHANGE UP (ref 2–14)
MUCOUS THREADS # UR AUTO: PRESENT
NEUTROPHILS # BLD AUTO: 22.49 K/UL — HIGH (ref 1.8–7.4)
NEUTROPHILS NFR BLD AUTO: 90.4 % — HIGH (ref 43–77)
NEUTS BAND # BLD: 2.6 % — SIGNIFICANT CHANGE UP (ref 0–8)
NITRITE UR-MCNC: NEGATIVE — SIGNIFICANT CHANGE UP
OPIATES UR-MCNC: NEGATIVE — SIGNIFICANT CHANGE UP
OSMOLALITY UR: 694 MOSM/KG — SIGNIFICANT CHANGE UP (ref 300–900)
OVALOCYTES BLD QL SMEAR: SLIGHT — SIGNIFICANT CHANGE UP
PCP SPEC-MCNC: SIGNIFICANT CHANGE UP
PCP UR-MCNC: NEGATIVE — SIGNIFICANT CHANGE UP
PH UR: 6 — SIGNIFICANT CHANGE UP (ref 5–8)
PHOSPHATE SERPL-MCNC: 3.8 MG/DL — SIGNIFICANT CHANGE UP (ref 2.5–4.5)
PLAT MORPH BLD: ABNORMAL
PLATELET # BLD AUTO: 219 K/UL — SIGNIFICANT CHANGE UP (ref 150–400)
POIKILOCYTOSIS BLD QL AUTO: SLIGHT — SIGNIFICANT CHANGE UP
POLYCHROMASIA BLD QL SMEAR: SLIGHT — SIGNIFICANT CHANGE UP
POTASSIUM SERPL-MCNC: 3 MMOL/L — LOW (ref 3.5–5.3)
POTASSIUM SERPL-MCNC: 3.1 MMOL/L — LOW (ref 3.5–5.3)
POTASSIUM SERPL-MCNC: 3.5 MMOL/L — SIGNIFICANT CHANGE UP (ref 3.5–5.3)
POTASSIUM SERPL-MCNC: 5.4 MMOL/L — HIGH (ref 3.5–5.3)
POTASSIUM SERPL-SCNC: 3 MMOL/L — LOW (ref 3.5–5.3)
POTASSIUM SERPL-SCNC: 3.1 MMOL/L — LOW (ref 3.5–5.3)
POTASSIUM SERPL-SCNC: 3.5 MMOL/L — SIGNIFICANT CHANGE UP (ref 3.5–5.3)
POTASSIUM SERPL-SCNC: 5.4 MMOL/L — HIGH (ref 3.5–5.3)
POTASSIUM UR-SCNC: 43 MMOL/L — SIGNIFICANT CHANGE UP
PROCALCITONIN SERPL-MCNC: 0.58 NG/ML — HIGH (ref 0.02–0.1)
PROT ?TM UR-MCNC: 56 MG/DL — HIGH (ref 0–12)
PROT SERPL-MCNC: 5.9 G/DL — LOW (ref 6–8.3)
PROT UR-MCNC: 100 MG/DL
PROT/CREAT UR-RTO: 0.7 RATIO — HIGH (ref 0–0.2)
PROTHROM AB SERPL-ACNC: 14.2 SEC — HIGH (ref 9.5–13)
RBC # BLD: 4.23 M/UL — SIGNIFICANT CHANGE UP (ref 3.8–5.2)
RBC # FLD: 16.3 % — HIGH (ref 10.3–14.5)
RBC BLD AUTO: ABNORMAL
RBC CASTS # UR COMP ASSIST: 2 /HPF — SIGNIFICANT CHANGE UP (ref 0–4)
RH IG SCN BLD-IMP: POSITIVE — SIGNIFICANT CHANGE UP
SALICYLATES SERPL-MCNC: 1 MG/DL — LOW (ref 2.8–20)
SODIUM SERPL-SCNC: 148 MMOL/L — HIGH (ref 135–145)
SODIUM SERPL-SCNC: 150 MMOL/L — HIGH (ref 135–145)
SODIUM SERPL-SCNC: 150 MMOL/L — HIGH (ref 135–145)
SODIUM SERPL-SCNC: 151 MMOL/L — HIGH (ref 135–145)
SODIUM UR-SCNC: 31 MMOL/L — SIGNIFICANT CHANGE UP
SP GR SPEC: >1.03 — HIGH (ref 1–1.03)
SPHEROCYTES BLD QL SMEAR: SLIGHT — SIGNIFICANT CHANGE UP
SQUAMOUS # UR AUTO: 2 /HPF — SIGNIFICANT CHANGE UP (ref 0–5)
T4 FREE SERPL-MCNC: 1.02 NG/DL — SIGNIFICANT CHANGE UP (ref 0.93–1.7)
THC UR QL: NEGATIVE — SIGNIFICANT CHANGE UP
TROPONIN T, HIGH SENSITIVITY RESULT: 188 NG/L — CRITICAL HIGH (ref 0–51)
TROPONIN T, HIGH SENSITIVITY RESULT: 220 NG/L — CRITICAL HIGH (ref 0–51)
TROPONIN T, HIGH SENSITIVITY RESULT: 220 NG/L — CRITICAL HIGH (ref 0–51)
TSH SERPL-MCNC: 8.97 UIU/ML — HIGH (ref 0.27–4.2)
UROBILINOGEN FLD QL: 1 MG/DL — SIGNIFICANT CHANGE UP (ref 0.2–1)
UUN UR-MCNC: 977 MG/DL — SIGNIFICANT CHANGE UP
WBC # BLD: 24.18 K/UL — HIGH (ref 3.8–10.5)
WBC # FLD AUTO: 24.18 K/UL — HIGH (ref 3.8–10.5)
WBC UR QL: 5 /HPF — SIGNIFICANT CHANGE UP (ref 0–5)

## 2024-01-23 PROCEDURE — 99222 1ST HOSP IP/OBS MODERATE 55: CPT | Mod: GC

## 2024-01-23 PROCEDURE — 99221 1ST HOSP IP/OBS SF/LOW 40: CPT

## 2024-01-23 PROCEDURE — 93306 TTE W/DOPPLER COMPLETE: CPT | Mod: 26

## 2024-01-23 PROCEDURE — 73120 X-RAY EXAM OF HAND: CPT | Mod: 26,LT

## 2024-01-23 RX ORDER — POTASSIUM CHLORIDE 20 MEQ
20 PACKET (EA) ORAL
Refills: 0 | Status: DISCONTINUED | OUTPATIENT
Start: 2024-01-23 | End: 2024-01-23

## 2024-01-23 RX ORDER — PIPERACILLIN AND TAZOBACTAM 4; .5 G/20ML; G/20ML
4.5 INJECTION, POWDER, LYOPHILIZED, FOR SOLUTION INTRAVENOUS EVERY 8 HOURS
Refills: 0 | Status: DISCONTINUED | OUTPATIENT
Start: 2024-01-23 | End: 2024-01-28

## 2024-01-23 RX ORDER — SODIUM CHLORIDE 9 MG/ML
500 INJECTION, SOLUTION INTRAVENOUS
Refills: 0 | Status: DISCONTINUED | OUTPATIENT
Start: 2024-01-23 | End: 2024-01-23

## 2024-01-23 RX ORDER — SODIUM CHLORIDE 9 MG/ML
500 INJECTION, SOLUTION INTRAVENOUS ONCE
Refills: 0 | Status: COMPLETED | OUTPATIENT
Start: 2024-01-23 | End: 2024-01-23

## 2024-01-23 RX ORDER — SODIUM CHLORIDE 9 MG/ML
1000 INJECTION, SOLUTION INTRAVENOUS
Refills: 0 | Status: DISCONTINUED | OUTPATIENT
Start: 2024-01-23 | End: 2024-01-23

## 2024-01-23 RX ORDER — LATANOPROST 0.05 MG/ML
1 SOLUTION/ DROPS OPHTHALMIC; TOPICAL ONCE
Refills: 0 | Status: COMPLETED | OUTPATIENT
Start: 2024-01-23 | End: 2024-01-23

## 2024-01-23 RX ORDER — LANOLIN ALCOHOL/MO/W.PET/CERES
3 CREAM (GRAM) TOPICAL AT BEDTIME
Refills: 0 | Status: DISCONTINUED | OUTPATIENT
Start: 2024-01-23 | End: 2024-01-30

## 2024-01-23 RX ORDER — METOPROLOL TARTRATE 50 MG
12.5 TABLET ORAL EVERY 8 HOURS
Refills: 0 | Status: DISCONTINUED | OUTPATIENT
Start: 2024-01-23 | End: 2024-01-24

## 2024-01-23 RX ORDER — SODIUM CHLORIDE 9 MG/ML
1000 INJECTION, SOLUTION INTRAVENOUS
Refills: 0 | Status: DISCONTINUED | OUTPATIENT
Start: 2024-01-23 | End: 2024-01-24

## 2024-01-23 RX ORDER — LEVOTHYROXINE SODIUM 125 MCG
70 TABLET ORAL AT BEDTIME
Refills: 0 | Status: DISCONTINUED | OUTPATIENT
Start: 2024-01-23 | End: 2024-01-23

## 2024-01-23 RX ORDER — POTASSIUM CHLORIDE 20 MEQ
40 PACKET (EA) ORAL ONCE
Refills: 0 | Status: COMPLETED | OUTPATIENT
Start: 2024-01-23 | End: 2024-01-23

## 2024-01-23 RX ORDER — LEVOTHYROXINE SODIUM 125 MCG
70 TABLET ORAL ONCE
Refills: 0 | Status: COMPLETED | OUTPATIENT
Start: 2024-01-23 | End: 2024-01-23

## 2024-01-23 RX ORDER — ONDANSETRON 8 MG/1
4 TABLET, FILM COATED ORAL EVERY 8 HOURS
Refills: 0 | Status: DISCONTINUED | OUTPATIENT
Start: 2024-01-23 | End: 2024-01-30

## 2024-01-23 RX ORDER — LATANOPROST 0.05 MG/ML
1 SOLUTION/ DROPS OPHTHALMIC; TOPICAL AT BEDTIME
Refills: 0 | Status: DISCONTINUED | OUTPATIENT
Start: 2024-01-24 | End: 2024-01-30

## 2024-01-23 RX ORDER — PIPERACILLIN AND TAZOBACTAM 4; .5 G/20ML; G/20ML
4.5 INJECTION, POWDER, LYOPHILIZED, FOR SOLUTION INTRAVENOUS ONCE
Refills: 0 | Status: COMPLETED | OUTPATIENT
Start: 2024-01-23 | End: 2024-01-23

## 2024-01-23 RX ORDER — POTASSIUM CHLORIDE 20 MEQ
10 PACKET (EA) ORAL
Refills: 0 | Status: COMPLETED | OUTPATIENT
Start: 2024-01-23 | End: 2024-01-23

## 2024-01-23 RX ORDER — ACETAMINOPHEN 500 MG
650 TABLET ORAL EVERY 6 HOURS
Refills: 0 | Status: DISCONTINUED | OUTPATIENT
Start: 2024-01-23 | End: 2024-01-30

## 2024-01-23 RX ORDER — ENOXAPARIN SODIUM 100 MG/ML
40 INJECTION SUBCUTANEOUS EVERY 24 HOURS
Refills: 0 | Status: DISCONTINUED | OUTPATIENT
Start: 2024-01-23 | End: 2024-01-24

## 2024-01-23 RX ORDER — LEVOTHYROXINE SODIUM 125 MCG
88 TABLET ORAL DAILY
Refills: 0 | Status: DISCONTINUED | OUTPATIENT
Start: 2024-01-24 | End: 2024-01-30

## 2024-01-23 RX ORDER — POTASSIUM CHLORIDE 20 MEQ
10 PACKET (EA) ORAL
Refills: 0 | Status: DISCONTINUED | OUTPATIENT
Start: 2024-01-23 | End: 2024-01-24

## 2024-01-23 RX ORDER — CEFTRIAXONE 500 MG/1
1000 INJECTION, POWDER, FOR SOLUTION INTRAMUSCULAR; INTRAVENOUS EVERY 24 HOURS
Refills: 0 | Status: DISCONTINUED | OUTPATIENT
Start: 2024-01-23 | End: 2024-01-23

## 2024-01-23 RX ORDER — METOPROLOL TARTRATE 50 MG
2.5 TABLET ORAL EVERY 8 HOURS
Refills: 0 | Status: DISCONTINUED | OUTPATIENT
Start: 2024-01-23 | End: 2024-01-23

## 2024-01-23 RX ADMIN — Medication 40 MILLIEQUIVALENT(S): at 20:56

## 2024-01-23 RX ADMIN — Medication 100 MILLIEQUIVALENT(S): at 22:32

## 2024-01-23 RX ADMIN — Medication 2.5 MILLIGRAM(S): at 14:34

## 2024-01-23 RX ADMIN — Medication 70 MICROGRAM(S): at 06:23

## 2024-01-23 RX ADMIN — Medication 100 MILLIEQUIVALENT(S): at 08:57

## 2024-01-23 RX ADMIN — SODIUM CHLORIDE 1000 MILLILITER(S): 9 INJECTION, SOLUTION INTRAVENOUS at 17:17

## 2024-01-23 RX ADMIN — PIPERACILLIN AND TAZOBACTAM 25 GRAM(S): 4; .5 INJECTION, POWDER, LYOPHILIZED, FOR SOLUTION INTRAVENOUS at 22:13

## 2024-01-23 RX ADMIN — SODIUM CHLORIDE 100 MILLILITER(S): 9 INJECTION, SOLUTION INTRAVENOUS at 07:41

## 2024-01-23 RX ADMIN — PIPERACILLIN AND TAZOBACTAM 200 GRAM(S): 4; .5 INJECTION, POWDER, LYOPHILIZED, FOR SOLUTION INTRAVENOUS at 11:15

## 2024-01-23 RX ADMIN — LATANOPROST 1 DROP(S): 0.05 SOLUTION/ DROPS OPHTHALMIC; TOPICAL at 13:49

## 2024-01-23 RX ADMIN — Medication 100 MILLIEQUIVALENT(S): at 20:53

## 2024-01-23 RX ADMIN — SODIUM CHLORIDE 100 MILLILITER(S): 9 INJECTION, SOLUTION INTRAVENOUS at 05:00

## 2024-01-23 RX ADMIN — PIPERACILLIN AND TAZOBACTAM 25 GRAM(S): 4; .5 INJECTION, POWDER, LYOPHILIZED, FOR SOLUTION INTRAVENOUS at 14:04

## 2024-01-23 RX ADMIN — SODIUM CHLORIDE 166.67 MILLILITER(S): 9 INJECTION INTRAMUSCULAR; INTRAVENOUS; SUBCUTANEOUS at 00:08

## 2024-01-23 RX ADMIN — ENOXAPARIN SODIUM 40 MILLIGRAM(S): 100 INJECTION SUBCUTANEOUS at 17:21

## 2024-01-23 RX ADMIN — SODIUM CHLORIDE 100 MILLILITER(S): 9 INJECTION, SOLUTION INTRAVENOUS at 04:20

## 2024-01-23 RX ADMIN — Medication 12.5 MILLIGRAM(S): at 22:13

## 2024-01-23 RX ADMIN — SODIUM CHLORIDE 80 MILLILITER(S): 9 INJECTION, SOLUTION INTRAVENOUS at 13:51

## 2024-01-23 RX ADMIN — Medication 100 MILLIEQUIVALENT(S): at 23:50

## 2024-01-23 RX ADMIN — Medication 100 MILLIEQUIVALENT(S): at 09:55

## 2024-01-23 RX ADMIN — SODIUM CHLORIDE 80 MILLILITER(S): 9 INJECTION, SOLUTION INTRAVENOUS at 11:15

## 2024-01-23 RX ADMIN — CEFTRIAXONE 100 MILLIGRAM(S): 500 INJECTION, POWDER, FOR SOLUTION INTRAMUSCULAR; INTRAVENOUS at 04:20

## 2024-01-23 NOTE — PHYSICAL THERAPY INITIAL EVALUATION ADULT - PREDICTED DURATION OF THERAPY (DAYS/WKS), PT EVAL
Pt. wbenefit from continued PT follow up to improve strength, balance, endurance, functional mobility.

## 2024-01-23 NOTE — PROGRESS NOTE ADULT - PROBLEM SELECTOR PLAN 11
Likely in the setting of NS administration vs. lactic acidosis (Lactate 3.7)  AG 19 (high), HCO3 15 (low)    -CTM F: D5W with bicarb   E: Replete as necessary, with goal K>4 and Mg>2  N: NPO   DVT: None  DISPO: Telemetry  CODE: DNR

## 2024-01-23 NOTE — H&P ADULT - ASSESSMENT
89YO F with PMHx of Glaucoma (Latanoprost), Hypothyroidism, HTN presents from [facility] due to being found conscious on the ground in her apartment, found to be in Atrial fibrillation with RVR and meeting SIRS criteria, now admitted to telemetry for work-up of syncope, altered mental status, and further monitoring.

## 2024-01-23 NOTE — H&P ADULT - PROBLEM SELECTOR PLAN 3
AST>ALT 2:1 ratio 2/2 alcohol use  216/56, Alkaline phosphatase 129 (high)   Pt reports drinking 1 oz vodka 1x/week    -Trend LFTs  -f/u Acetaminophen level  -f/u Salicyclate level Patient meeting 3/4 SIRS criteria (HR>90, WBC >12, lactate >2) w/o clear source.   No respiratory (cough, SOB), abdominal, or urinary complaints upon admission. No fever, fatigue, chills.  COVID/Influenza/RSV negative.  CTPE unremarkable.  -CTX 1g QD x5 days for empiric cystitic tx (1/23-1/27)  -f/u UA with Reflex Culture  -f/u Procalcitonin  -Trend lactate to clear.  -Avoid anti-cholinergics (can exacerbate urinary retention)  - Monitor WBC  - Monitor for fevers  - Monitor VS  - f/u BCx

## 2024-01-23 NOTE — H&P ADULT - PROBLEM SELECTOR PLAN 5
Home meds: Levothyroxine 88mcg QD    -c/w IV 80% home PO dose (70mcg QD)  -Pending Speech&Swallow eval, switch to PO Levothyroxin 88mcg QD  -f/u BMP Home meds: Levothyroxine 88mcg QD  TSH elevated at 8.727    -c/w IV 80% home PO dose (70mcg QD)  -Pending Speech&Swallow eval, switch to PO Levothyroxin 88mcg QD  -f/u BMP Likely 2/2 Demand ischemia in the setting of hypoperfusion/sepsis  Troponins elevated although downtrending 212.6>190.5>188  EKG non-ischemic changes.    -Trend troponin

## 2024-01-23 NOTE — SWALLOW FEES ASSESSMENT ADULT - DIAGNOSTIC IMPRESSIONS
Severe pharyngeal dysphagia with primary impact to safety of the swallow and mild impact to efficiency of the swallow. **** Delayed pharyngeal swallow and incomplete laryngeal vestibule closure marked by incomplete white out resulted in penetration before the swallow with initial trial of thin liquids. Chronic penetration visualized after the swallow with remaining trials of thin liquids from post cricoid residual and intermittently from vallecular residue. Patient was consistently sensate with a productive throat clear/cough response. Similar presentation with mildly thick liquids. Implementation of an intentional bolus hold+swallow the entire bolus improved timeliness of the swallow and clearance with thin liquids. Inferred reduced BOT retraction and pharyngeal squeeze resulted in mild residual in the vallecula and lateral channels with purees, reduced with liquid wash.    Presentation likely c/w deconditioning in the setting of acute event, unresponsive and likely NPO for multiple days per discussion with family. ***INCOMPLETE Severe pharyngeal dysphagia with primary impact to safety of the swallow and mild impact to efficiency of the swallow. Delayed pharyngeal swallow resulted in intermittent penetration before the swallow with thin liquids, most apparent with initial trial. Penetration was deemed chronic, however, primarily occurred after the swallow from post cricoid residual and intermittently from vallecular residue. Patient was consistently sensate with a productive throat clear/cough response. Similar presentation with mildly thick liquids. Implementation of an intentional bolus hold+swallow the entire bolus improved timeliness of the swallow and clearance with thin liquids. Inferred reduced BOT retraction and pharyngeal squeeze resulted in mild residual in the vallecula and lateral channels with purees, reduced with liquid wash.    Presentation likely c/w deconditioning in the setting of acute event, unresponsiveness and likely NPO for multiple days per discussion with family.  Improvement in presentation anticipated with ongoing recovery/overarching medical gains. This service to follow up to assess tolerance of recommended diet/use of strategies. Moderate pharyngeal dysphagia with primary impact to safety of the swallow and mild impact to efficiency of the swallow. Delayed pharyngeal swallow resulted in intermittent penetration before the swallow with thin liquids, most apparent with initial trial. Penetration was deemed chronic, however, primarily occurred after the swallow from post cricoid residual associated and intermittently from vallecular residue. Patient was consistently sensate with a productive throat clear/cough response. Similar presentation with mildly thick liquids. Implementation of an intentional bolus hold+swallow the entire bolus improved timeliness of the swallow and clearance with thin liquids. Mild residual (deemed <10%) in the vallecula and lateral channels also visualized with purees, effectively cleared with a liquid wash.    Presentation likely c/w deconditioning in the setting of acute event, unresponsiveness and likely NPO for multiple days per discussion with family.  Improvement in presentation anticipated with ongoing recovery/overarching medical gains. This service to follow up to assess tolerance of recommended diet/use of strategies.

## 2024-01-23 NOTE — PROGRESS NOTE ADULT - ASSESSMENT
89YO F with PMHx of Glaucoma (Latanoprost), Hypothyroidism, HTN presents from [facility] due to being found conscious on the ground in her apartment, found to be in Atrial fibrillation with RVR and meeting SIRS criteria, now admitted to telemetry for work-up of syncope, altered mental status, and further monitoring. 88F with PMHx of Glaucoma (Latanoprost), Hypothyroidism, HTN presents from [facility] due to being found conscious on the ground in her apartment, found to be in Atrial fibrillation with RVR and meeting SIRS criteria, now admitted to telemetry for work-up of syncope, altered mental status, and further monitoring. 88F with PMHx of Glaucoma (Latanoprost), Hypothyroidism, HTN presents from [facility] due to being found conscious on the ground in her apartment, found to meet SIRS criteria (sepsis vs. reactive) and ectopy, now admitted to telemetry for work-up of syncope, altered mental status, and further monitoring.

## 2024-01-23 NOTE — PROGRESS NOTE ADULT - PROBLEM SELECTOR PLAN 12
F: LR  E: Replete as necessary, with goal K>4 and Mg>2  N: NPO   DVT: None  DISPO: Telemetry  CODE: DNR F: D5W with bicarb   E: Replete as necessary, with goal K>4 and Mg>2  N: NPO   DVT: None  DISPO: Telemetry  CODE: DNR.

## 2024-01-23 NOTE — PATIENT PROFILE ADULT - FALL HARM RISK - HARM RISK INTERVENTIONS

## 2024-01-23 NOTE — H&P ADULT - HISTORY OF PRESENT ILLNESS
87YO F with PMHx of Glaucoma (Latanoprost), Hypothyroidism, HTN presents from [facility] due to being found conscious on the ground in her apartment. Per [facility] ED note, patient's building superintendant was concerned when patient did not  newspaper for 2 days and found patient lying awake on the floor and weak and thereafter called EMS. Patient lives alone, with no HHA, and uses cane to walk when outside but no cane/walker inside her home. Per patient, she does not recall what happened (lost consciousness) but vehemently denies a fall. Her last recollection involves visiting her niece, Alba, in [place] and sitting in her living room to read. She denies any prior history of falls. She denies loss of consciousness, and remained on the floor for ~2 days.   She drinks approximately 1 oz glass of vodka once weekly, with her last drink on Friday.       Of note, upon arrival to [facility], extensive bruising/echymoses were apparently of various stages of healing dispersed throughout her buttocks, back, arms, and legs. When inquiring regarding her feeling safe, she reports yes and denies any concerns or fears of harm to her by others. She reports not having much support, as her brother passed away last year and most of her loved ones have passed away.     Otherwise, she denies any chest pain, abdominal pain, N/V/D, fever, fatigue, chills, or other pertinent Sx on ROS.     ED COURSE  ·	VITALS: T 95.6 F, /75, HR 90, RR 18, SpO2 97% on RA.  ·	LABS: WBC 28.09 (high), %Neutrophils 89.7 (high), %Lymphocytes 3.1, D-dimer 1148 (high), Troponin I 212.6 (high), Na 149 (high), Cl 114 (low), HCO3 19 (low), BUN 51 (high), Albumin 2.7 (low), Alkaline Phosphatase 129 (high),  (high), ALT 56 (high), CK 4434 (high), Pro-BNP 2629 (high), Lactate 3.7 (high). COVID/Influenza/RSV negative.  ·	IMAGING: CTPE negative. CT Hip reveals Degenerative changes of the lumbar spine and pelvis with no fractures. CT C/A/P No Contrast No definite evidence of solid visceral injury in the chest, abdomen or pelvis within the limits of this noncontrast study. CT Head and Cervical spine No Cont negative.  ·	INTERVENTIONS: Aspirin 324mg x1, Levaquin 750mg x1, 2L NS Bolus, IV Ativan 0.5mg x1. 2L NS Bolus.   89YO F with PMHx of Glaucoma (Latanoprost), Hypothyroidism, HTN presents from [facility] due to being found conscious on the ground in her apartment. Per [facility] ED note, patient's building superintendant was concerned when patient did not  newspaper for 2 days and found patient lying awake on the floor and weak and thereafter called EMS. Patient lives alone, with no HHA, and uses cane to walk when outside but no cane/walker inside her home. Per patient, she does not recall what happened (lost consciousness) but vehemently denies a fall. Her last recollection involves visiting her niece, Alba, in [place] and sitting in her living room to read. She denies any prior history of falls. She denies loss of consciousness, and remained on the floor for ~2 days.   She drinks approximately 1 oz glass of vodka once weekly, with her last drink on Friday.       Of note, upon arrival to [facility], extensive bruising/echymoses were apparent of various stages of healing dispersed throughout her buttocks, back, arms, and legs. When inquiring regarding her feeling safe, she reports yes and denies any concerns or fears of harm to her by others. She reports not having much support, as her brother passed away last year and most of her loved ones have passed away.     Otherwise, she denies any chest pain, abdominal pain, N/V/D, fever, fatigue, chills, or other pertinent Sx on ROS.     ED COURSE  ·	VITALS: T 95.6 F, /75, HR 90, RR 18, SpO2 97% on RA.  ·	LABS: WBC 28.09 (high), %Neutrophils 89.7 (high), %Lymphocytes 3.1, D-dimer 1148 (high), Troponin I 212.6 (high), Na 149 (high), Cl 114 (low), HCO3 19 (low), BUN 51 (high), Albumin 2.7 (low), Alkaline Phosphatase 129 (high),  (high), ALT 56 (high), CK 4434 (high), Pro-BNP 2629 (high), Lactate 3.7 (high). COVID/Influenza/RSV negative.  ·	IMAGING: CTPE negative. CT Hip reveals Degenerative changes of the lumbar spine and pelvis with no fractures. CT C/A/P No Contrast No definite evidence of solid visceral injury in the chest, abdomen or pelvis within the limits of this noncontrast study. CT Head and Cervical spine No Cont negative.  ·	INTERVENTIONS: Aspirin 324mg x1, Levaquin 750mg x1, 2L NS Bolus, IV Ativan 0.5mg x1. 2L NS Bolus.   87YO F with PMHx of Glaucoma (Latanoprost), Hypothyroidism, HTN presents from [facility] due to being found conscious on the ground in her apartment. Per [facility] ED note, patient's building superintendant was concerned when patient did not  newspaper for 2 days and found patient lying awake on the floor and weak and thereafter called EMS. Patient lives alone, with no HHA, and uses cane to walk when outside but no cane/walker inside her home. Per patient, she does not recall what happened (lost consciousness), previously felt confused, but vehemently denies a fall. Her last recollection involves visiting her niece, Alba, in [place] and sitting in her living room to read. She denies any prior history of falls. She denies loss of consciousness, and remained on the floor for ~2 days.   She drinks approximately 1 oz glass of vodka once weekly, with her last drink on Friday.       Of note, upon arrival to [facility], extensive bruising/echymoses were apparent of various stages of healing dispersed throughout her buttocks, back, arms, and legs. When inquiring regarding her feeling safe, she reports yes and denies any concerns or fears of harm to her by others. She reports not having much support, as her brother passed away last year and most of her loved ones have passed away.     Otherwise, she denies any chest pain, abdominal pain, N/V/D, fever, fatigue, chills, or other pertinent Sx on ROS.     ED COURSE  ·	VITALS: T 95.6 F, /75, HR 90, RR 18, SpO2 97% on RA.  ·	LABS: WBC 28.09 (high), %Neutrophils 89.7 (high), %Lymphocytes 3.1, D-dimer 1148 (high), Troponin I 212.6 (high), Na 149 (high), Cl 114 (low), HCO3 19 (low), BUN 51 (high), Albumin 2.7 (low), Alkaline Phosphatase 129 (high),  (high), ALT 56 (high), CK 4434 (high), Pro-BNP 2629 (high), Lactate 3.7 (high). COVID/Influenza/RSV negative.  ·	IMAGING: CTPE negative. CT Hip reveals Degenerative changes of the lumbar spine and pelvis with no fractures. CT C/A/P No Contrast No definite evidence of solid visceral injury in the chest, abdomen or pelvis within the limits of this noncontrast study. CT Head and Cervical spine No Cont negative.  ·	INTERVENTIONS: Aspirin 324mg x1, Levaquin 750mg x1, 2L NS Bolus, IV Ativan 0.5mg x1. 2L NS Bolus.

## 2024-01-23 NOTE — CONSULT NOTE ADULT - SUBJECTIVE AND OBJECTIVE BOX
HPI:  89YO F with PMHx of HTN, HLD, PAD, Hypothyroidism, monoclonal gammopathy who presents from [facility] due to being found conscious on the ground in her apartment. Per [facility] ED note, patient's building superintendant was concerned when patient did not  newspaper for 2 days and found patient lying awake on the floor and weak and thereafter called EMS. Patient lives alone, with no HHA, and uses cane to walk when outside but no cane/walker inside her home. Per patient, she felt too weak to get up. Denies LOC. Admitted to [facility] for rhabdomyolysis, AMS and multiple electrolyte derangements. Cardiology consulted for frequent PVC's.     Pertinent HPI: Pt intermittently confused. Most of hx obtained from [facility]. She follows with Dr Elena. She last saw him in April 2023. Per documentation, she has a known systolic murmur with workup revealing only mild MR and mild- mod TR. LVOT velocities were high per documentation but no STEPHAN was noted.     ROS: A 10-point review of systems was otherwise negative.    PAST MEDICAL & SURGICAL HISTORY:  Hypothyroidism      HTN (hypertension)      Glaucoma      History of cataract surgery, right          SOCIAL HISTORY:  FAMILY HISTORY:      ALLERGIES: 	  Augmentin (Diarrhea)  NSAIDs (Other)  dairy products (Diarrhea)  Cipro (Diarrhea)  Flagyl (Other)            MEDICATIONS:  acetaminophen     Tablet .. 650 milliGRAM(s) Oral every 6 hours PRN  aluminum hydroxide/magnesium hydroxide/simethicone Suspension 30 milliLiter(s) Oral every 4 hours PRN  dextrose 5% 1000 milliLiter(s) IV Continuous <Continuous>  latanoprost 0.005% Ophthalmic Solution 1 Drop(s) Both EYES once  levothyroxine Injectable 70 MICROGram(s) IV Push at bedtime  LORazepam   Injectable 0.5 milliGRAM(s) IV Push once PRN  melatonin 3 milliGRAM(s) Oral at bedtime PRN  metoprolol tartrate Injectable 2.5 milliGRAM(s) IV Push Once  ondansetron Injectable 4 milliGRAM(s) IV Push every 8 hours PRN  piperacillin/tazobactam IVPB.- 4.5 Gram(s) IV Intermittent once  piperacillin/tazobactam IVPB.. 4.5 Gram(s) IV Intermittent every 8 hours      PHYSICAL EXAM:  T(C): 36.3 (01-23-24 @ 10:00), Max: 36.4 (01-22-24 @ 21:48)  HR: 82 (01-23-24 @ 12:42) (82 - 135)  BP: 125/83 (01-23-24 @ 12:42) (83/52 - 156/76)  RR: 15 (01-23-24 @ 12:42) (14 - 20)  SpO2: 97% (01-23-24 @ 12:42) (95% - 98%)  Wt(kg): --    GEN: Awake, comfortable. NAD.   HEENT: NCAT, PERRL, EOMI. Mucosa moist. No JVD.   RESP: CTA b/l  CV: RRR, normal s1/s2. No m/r/g.  ABD: Soft, NTND. BS+  EXT: Warm. No edema, clubbing, or cyanosis.   NEURO: AAOx3. No focal deficits.    I&O's Summary    22 Jan 2024 07:01  -  23 Jan 2024 07:00  --------------------------------------------------------  IN: 450 mL / OUT: 700 mL / NET: -250 mL    23 Jan 2024 07:01  -  23 Jan 2024 13:47  --------------------------------------------------------  IN: 540 mL / OUT: 0 mL / NET: 540 mL        Weight (kg): 58.1 (01-23 @ 08:40)  	  LABS:	 	    CARDIAC MARKERS:                                  13.7   24.18 )-----------( 219      ( 23 Jan 2024 03:44 )             40.9     01-23    150<H>  |  118<H>  |  38<H>  ----------------------------<  91  3.5   |  15<L>  |  0.83    Ca    8.8      23 Jan 2024 10:52  Phos  3.8     01-23  Mg     2.2     01-23    TPro  5.9<L>  /  Alb  2.9<L>  /  TBili  0.9  /  DBili  x   /  AST  160<H>  /  ALT  49<H>  /  AlkPhos  119  01-23    proBNP:   Lipid Profile:   HgA1c:   TSH: Thyroid Stimulating Hormone, Serum: 8.970 uIU/mL (01-23 @ 08:55)  Thyroid Stimulating Hormone, Serum: 8.727 uIU/mL (01-22 @ 17:58)      TELEMETRY: 	    ECG:  	  RADIOLOGY:   ECHO:  STRESS:  CATH:

## 2024-01-23 NOTE — PATIENT PROFILE ADULT - FUNCTIONAL ASSESSMENT - BASIC MOBILITY 6.
1-calculated by average/Not able to assess (calculate score using [facility] averaging method)

## 2024-01-23 NOTE — PROGRESS NOTE ADULT - SUBJECTIVE AND OBJECTIVE BOX
--------INCOMPLETE nOTE-------- OVERNIGHT EVENTS: bladder scan 583 cc -> straight cath 700cc    SUBJECTIVE  Pt was seen and examined at bedside. Appears drowsy and confused but redirectable and responsive to questions.     Patient denies any fevers/ chills, n/v, headache, acute SOB, chest pain, abdominal pain, genitourinary sx    12-point review of systems otherwise negative      Vital Signs Last 24 Hrs  T(C): 36.2 (23 Jan 2024 16:23), Max: 36.4 (22 Jan 2024 21:48)  T(F): 97.2 (23 Jan 2024 16:23), Max: 97.6 (22 Jan 2024 21:48)  HR: 94 (23 Jan 2024 15:50) (82 - 135)  BP: 138/74 (23 Jan 2024 15:50) (83/52 - 156/76)  BP(mean): 99 (23 Jan 2024 15:50) (88 - 122)  RR: 19 (23 Jan 2024 15:50) (14 - 20)  SpO2: 97% (23 Jan 2024 15:50) (95% - 98%)    Parameters below as of 23 Jan 2024 15:50  Patient On (Oxygen Delivery Method): room air          PHYSICAL EXAM     General: NAD  HEENT: NC/AT; PERRL; Neck Supple; dry MM, R sided tongue pain but w/ no lesions seen.   Respiratory: CTAB; no wheezes/rales/rhonchi, normal WOB  Cardiovascular: Regular rhythm/rate, + S1/S2; no murmurs, rubs gallops   Gastrointestinal: Soft; NTND; bowel sounds normal and present  : no suprapubic tenderness. + primifit  Vascular: extremities WWP; no edema/cyanosis  Neurological: A&Ox1, no obvious focal deficits  Integument: Extensive ecchymosis with various stages of healing on buttocks, trunk, dorsal and ventral aspects of UE and LE B/L. Stage I Pressure ulcers on L and R buttocks and mid-thoracic region of trunk. +Swelling and erythema of L hand.      .  LABS:                         13.7   24.18 )-----------( 219      ( 23 Jan 2024 03:44 )             40.9     01-23    150<H>  |  118<H>  |  38<H>  ----------------------------<  91  3.5   |  15<L>  |  0.83    Ca    8.8      23 Jan 2024 10:52  Phos  3.8     01-23  Mg     2.2     01-23    TPro  5.9<L>  /  Alb  2.9<L>  /  TBili  0.9  /  DBili  x   /  AST  160<H>  /  ALT  49<H>  /  AlkPhos  119  01-23    PT/INR - ( 23 Jan 2024 03:44 )   PT: 14.2 sec;   INR: 1.25          PTT - ( 23 Jan 2024 03:44 )  PTT:23.5 sec  Urinalysis Basic - ( 23 Jan 2024 10:52 )    Color: x / Appearance: x / SG: x / pH: x  Gluc: 91 mg/dL / Ketone: x  / Bili: x / Urobili: x   Blood: x / Protein: x / Nitrite: x   Leuk Esterase: x / RBC: x / WBC x   Sq Epi: x / Non Sq Epi: x / Bacteria: x      CARDIAC MARKERS ( 23 Jan 2024 08:55 )  x     / x     / 3944 U/L / x     / 67.1 ng/mL  CARDIAC MARKERS ( 23 Jan 2024 03:44 )  x     / x     / 5183 U/L / x     / x      CARDIAC MARKERS ( 22 Jan 2024 17:58 )  x     / x     / 4434 U/L / x     / x            Lactate, Blood: 2.0 mmol/L (01-23 @ 10:56)      < from: TTE Echo Complete w/ Contrast w/ Doppler (01.23.24 @ 11:22) >  CONCLUSIONS:     1. Normal left and right ventricular size and systolic function.   2. Mild symmetric left ventricular hypertrophy.   3. Grade I left ventricular diastolic dysfunction.   4. Mild mitral regurgitation.   5. Aortic sclerosis without significant stenosis.   6. Pulmonary artery systolic pressure is 32 mmHg.   7. Trivial pericardial effusion.    < end of copied text >   OVERNIGHT EVENTS: bladder scan 583 cc -> straight cath 700cc    SUBJECTIVE  Pt was seen and examined at bedside. Appears drowsy and confused but redirectable and responsive to questions.     Patient denies any fevers/ chills, n/v, headache, acute SOB, chest pain, abdominal pain, genitourinary sx    12-point review of systems otherwise negative      Vital Signs Last 24 Hrs  T(C): 36.2 (23 Jan 2024 16:23), Max: 36.4 (22 Jan 2024 21:48)  T(F): 97.2 (23 Jan 2024 16:23), Max: 97.6 (22 Jan 2024 21:48)  HR: 94 (23 Jan 2024 15:50) (82 - 135)  BP: 138/74 (23 Jan 2024 15:50) (83/52 - 156/76)  BP(mean): 99 (23 Jan 2024 15:50) (88 - 122)  RR: 19 (23 Jan 2024 15:50) (14 - 20)  SpO2: 97% (23 Jan 2024 15:50) (95% - 98%)    Parameters below as of 23 Jan 2024 15:50  Patient On (Oxygen Delivery Method): room air          PHYSICAL EXAM     General: NAD  HEENT: NC/AT; PERRL; Neck Supple; dry MM, R sided tongue pain but w/ no lesions seen. Voice is not at baseline and more hoarse per family bedside   Respiratory: CTAB; no wheezes/rales/rhonchi, normal WOB  Cardiovascular: Regular rhythm/rate, + S1/S2; no murmurs, rubs gallops   Gastrointestinal: Soft; NTND; bowel sounds normal and present  : no suprapubic tenderness. + primifit  Vascular: extremities WWP; no edema/cyanosis  Neurological: A&Ox1, no obvious focal deficits  Integument: Extensive ecchymosis with various stages of healing on buttocks, trunk, dorsal and ventral aspects of UE and LE B/L. Stage I Pressure ulcers on L and R buttocks and mid-thoracic region of trunk. +Swelling and erythema of L hand.      .  LABS:                         13.7   24.18 )-----------( 219      ( 23 Jan 2024 03:44 )             40.9     01-23    150<H>  |  118<H>  |  38<H>  ----------------------------<  91  3.5   |  15<L>  |  0.83    Ca    8.8      23 Jan 2024 10:52  Phos  3.8     01-23  Mg     2.2     01-23    TPro  5.9<L>  /  Alb  2.9<L>  /  TBili  0.9  /  DBili  x   /  AST  160<H>  /  ALT  49<H>  /  AlkPhos  119  01-23    PT/INR - ( 23 Jan 2024 03:44 )   PT: 14.2 sec;   INR: 1.25          PTT - ( 23 Jan 2024 03:44 )  PTT:23.5 sec  Urinalysis Basic - ( 23 Jan 2024 10:52 )    Color: x / Appearance: x / SG: x / pH: x  Gluc: 91 mg/dL / Ketone: x  / Bili: x / Urobili: x   Blood: x / Protein: x / Nitrite: x   Leuk Esterase: x / RBC: x / WBC x   Sq Epi: x / Non Sq Epi: x / Bacteria: x      CARDIAC MARKERS ( 23 Jan 2024 08:55 )  x     / x     / 3944 U/L / x     / 67.1 ng/mL  CARDIAC MARKERS ( 23 Jan 2024 03:44 )  x     / x     / 5183 U/L / x     / x      CARDIAC MARKERS ( 22 Jan 2024 17:58 )  x     / x     / 4434 U/L / x     / x            Lactate, Blood: 2.0 mmol/L (01-23 @ 10:56)      < from: TTE Echo Complete w/ Contrast w/ Doppler (01.23.24 @ 11:22) >  CONCLUSIONS:     1. Normal left and right ventricular size and systolic function.   2. Mild symmetric left ventricular hypertrophy.   3. Grade I left ventricular diastolic dysfunction.   4. Mild mitral regurgitation.   5. Aortic sclerosis without significant stenosis.   6. Pulmonary artery systolic pressure is 32 mmHg.   7. Trivial pericardial effusion.    < end of copied text >

## 2024-01-23 NOTE — PROGRESS NOTE ADULT - PROBLEM SELECTOR PLAN 9
2/2 Fall.  CK elevated 4434>5183    -f/u CK in AM  -PT Eval      #HTN  Home medication: Ramapril (unknown dose to pt)    -hold home med for now given NPO status - continue home med Latanoprost 0.005% Likely in the setting of NS administration vs. lactic acidosis (Lactate 3.7)  AG 19 (high), HCO3 15 (low) TSH elevated at 8.727. FT4 WNL. Subclinical.    - c/w IV 80% home PO dose (70mcg QD)  - continue home Levothyroxine 88mcg PO    #Glaucoma  - continue home med Latanoprost 0.005%    #HTN  - holding Home med Ramipril 10 i/s/o hypovolemia.

## 2024-01-23 NOTE — PHYSICAL THERAPY INITIAL EVALUATION ADULT - ADDITIONAL COMMENTS
Consultation - Infectious Disease   Sherman Mello  59 y o  male MRN: [id number]  Unit/Bed#: E2 -01 Encounter: [id number]      IMPRESSION & RECOMMENDATIONS:   1  Cellulitis and olecranon bursitis of the right upper extremity  History and exam appear consistent with cellulitis likely due to olecranon bursitis  The patient however continues to have significant sensitivity and waxing and waning pain despite improvement on external exam   Aspiration unsuccessful  Patient fortunately hemodynamically stable  Blood cultures negative  Injury likely from overuse  Noninfectious causes including gout/crystal arthropathy  Will obtain CT of the right upper extremity for completeness  Will continue on IV Ancef today  If patient continues to improve tomorrow will transition oral Keflex  Plan for total of 14 days of therapy in the setting of bursitis  Continue to trend fever curve/vitals  Continue to monitor labs  Serial exams  Orthopedic evaluation appreciated  Additional supportive care as per primary  Additional interventions pending clinical course    2  Diabetes and obesity  Patient noted to have relatively well controlled diabetes  BMI noted to be 38  Glucose management per primary  Recommend discussion of further weight loss as outpatient    3  History of gouty arthritis  Patient reports previous history of disease involving his toes  Has not had an attack for many years and no new medications to report  Antibiotics as above  Above plan discussed in detail with patient at bedside  Above plan discussed in detail with primary service attending  ID consult service will continue to follow  HISTORY OF PRESENT ILLNESS:  Reason for Consult:  Left elbow cellulitis    HPI: Sherman Mello  is a 59y o  year old male with history of diabetes and hypertension  He has had previous surgery on his right hand, right knee as well as the spine    He denies any history of homelessness, drug use, TB  Patient presented on 09/19 with worsening swelling and pain in the right upper extremity surrounding the elbow  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  He denied having any clear injury but there may have been some overuse  He woke up with the symptoms and they progressively worsened which prompted him to come to the emergency department  X-rays of the arms were done without any bony changes but there was question of olecranon bursitis  CT imaging of the chest was done due to right arm pain along with some neck discomfort which was without findings  CT cervical spine also unremarkable  Patient was without fevers or leukocytosis  Blood cultures have been without growth  His other vitals and been stable  Patient was started on Ancef  Orthopedics was consulted  Patient reports that his pain has been waxing and waning over the admission  Self reports on evaluation that there is less swelling as well as less warmth and erythema compared to when he came in  Unfortunately no pictures from admission and patient does not have pictures himself  He does however continue to have significant sensitivity at the site  He otherwise denies having any nausea, vomiting, chest pain or shortness of breath  He has not been seen by our service previously  No prior culture data in our system  No significant data in Care everywhere  We are consulted at this time for further assistance in management  Patient did have attempted aspiration yesterday with Orthopedics but unfortunately no fluid was expressed  REVIEW OF SYSTEMS:  A complete 12 point system-based review of systems is negative other than that noted in the HPI      PAST MEDICAL HISTORY:  Past Medical History:   Diagnosis Date    Diabetes mellitus ([facility] [address] )     Hypertension      Past Surgical History:   Procedure Laterality Date    HAND SURGERY      KNEE SURGERY      SPINE SURGERY         FAMILY HISTORY:  Non-contributory    SOCIAL HISTORY:  Social History   Social History     Substance and Sexual Activity   Alcohol Use Yes    Alcohol/week: 1 0 standard drinks    Types: 1 Cans of beer per week     Social History     Substance and Sexual Activity   Drug Use Never     Social History     Tobacco Use   Smoking Status Never Smoker   Smokeless Tobacco Never Used       ALLERGIES:  Allergies   Allergen Reactions    Morphine GI Intolerance and Itching    Celecoxib Other (See Comments)     pt can't urinate      Fentanyl      Other reaction(s): dizziness      Finasteride GI Intolerance    Naproxen      Other reaction(s): worsening knee pain      Oxycodone      Other reaction(s): itching       MEDICATIONS:  All current active medications have been reviewed  PHYSICAL EXAM:  Temp:  [96 8 °F (36 °C)-97 4 °F (36 3 °C)] 97 4 °F (36 3 °C)  HR:  [50-64] 54  Resp:  [16] 16  BP: (120-137)/(63-75) 120/63  SpO2:  [97 %-100 %] 97 %  Temp (24hrs), Av 2 °F (36 2 °C), Min:96 8 °F (36 °C), Max:97 4 °F (36 3 °C)  Current: Temperature: (!) 97 4 °F (36 3 °C)    Intake/Output Summary (Last 24 hours) at 2020 1102  Last data filed at 2020 0745  Gross per 24 hour   Intake 220 ml   Output    Net 220 ml       General Appearance:  Appearing well, nontoxic, and in no distress   Head:  Normocephalic, without obvious abnormality, atraumatic   Eyes:  Conjunctiva pink and sclera anicteric, both eyes   Nose: Nares normal, mucosa normal, no drainage   Throat: Oropharynx moist without lesions   Neck: Supple, symmetrical, no adenopathy, no tenderness/mass/nodules   Back:   Symmetric, no curvature, ROM normal, no CVA tenderness; no spinal or paraspinal muscle tenderness to palpation     Lungs:   Clear to auscultation bilaterally, respirations unlabored on room air   Chest Wall:  No tenderness or deformity   Heart:  RRR; no murmur, rub or gallop appreciated   Abdomen:   Soft, non-tender, non-distended, positive bowel sounds Extremities: No cyanosis, clubbing or edema   Skin: Patient's right arm was unwrapped  There is no significant erythema and very mild swelling  The patient has significant tenderness with light palpation across the elbow itself  He also has pain with small movements of the joint  Lymph nodes: Cervical, supraclavicular nodes normal   Neurologic: Alert and oriented times 3, strength is limited in the right upper extremity due to pain  Moving his other extremities freely against gravity  LABS, IMAGING, & OTHER STUDIES:  Lab Results:  I have personally reviewed pertinent labs  Results from last 7 days   Lab Units 09/23/20  0434 09/20/20  0506 09/19/20  1326   WBC Thousand/uL 5 09 8 01 8 17   HEMOGLOBIN g/dL 12 6 13 9 14 8   PLATELETS Thousands/uL 161 159 184     Results from last 7 days   Lab Units 09/20/20  0506 09/19/20  1326   POTASSIUM mmol/L 3 8 3 8   CHLORIDE mmol/L 103 103   CO2 mmol/L 26 28   BUN mg/dL 12 12   CREATININE mg/dL 1 00 1 05   EGFR ml/min/1 73sq m 79 75   CALCIUM mg/dL 8 8 9 0   AST U/L 15 18   ALT U/L 30 38   ALK PHOS U/L 75 81     Results from last 7 days   Lab Units 09/19/20  1748 09/19/20  1731   BLOOD CULTURE  No Growth at 72 hrs  No Growth at 72 hrs  Imaging Studies:   I have personally reviewed pertinent imaging study reports and images in PACS  Other Studies:   I have personally reviewed pertinent reports  Pt. lives alone, elevator access, Ambulates with SC outdoors and no device indoors.

## 2024-01-23 NOTE — H&P ADULT - NSHPSOCIALHISTORY_GEN_ALL_CORE
Living - Apartment alone  EtOH - drinks 1 oz vodka 1x/week  Tobacco - does not currently smoke, quit 30 years ago. Previously smoked 1 PPD x20 years.  Recreational drug use - Denies.  Uses Cane to walk when outside of home. Does not have HHA.

## 2024-01-23 NOTE — H&P ADULT - PROBLEM SELECTOR PLAN 9
Home medication: Ramapril (unknown dose to pt)    -hold home med for now given NPO status 2/2 Fall.  CK elevated 4434>5183    -f/u CK in AM  -PT Eval      #HTN  Home medication: Ramapril (unknown dose to pt)    -hold home med for now given NPO status

## 2024-01-23 NOTE — CONSULT NOTE ADULT - ASSESSMENT
89YO F with PMHx of HTN, HLD, PAD, Hypothyroidism, monoclonal gammopathy who presented to [facility] after being found on the ground in her apartment without syncopal event. Admitted to medicine telemetry for rhabdomyolysis, AMS and multiple electrolyte derangements. Cardiology consulted for frequent PVC's.     REVIEW OF STUDIES:   ECG: Sinus tachycardia with bigeminy. Normal axis. No ischemic changes  Tele notable for bigeminy and frequent couplets and isolated PVCs  TTE 1/23: Normal LV function, no high gradients across LVOT ( 12mmHg) suggestive of LVOT obstruction, Grade 1 DD, mild MR, trace TR, PASP 32mmHg     #Asymptomatic PVC's   Normal LV function with frequent PVC's ( couplets/ bigeminy/ isolated PVC's)   She is asymptomatic at this time   Given ectopy burden, recommend IV 2.5mg metoprolol q8h as patient cannot tolerate PO  Her PVC's are likely in the setting of electrolyte abnormalities/ rhabdomyolysis which are improving   Keep K > 4 and Magnesium > 2    #Troponin elevation   No active chest pain and non ischemic ECG  Trop elevation in the setting of rhabdomyolysis   Low suspicion for ACS event     Recommendations are final upon attending attestation      87YO F with PMHx of HTN, HLD, PAD, Hypothyroidism, monoclonal gammopathy who presented to [facility] after being found on the ground in her apartment without syncopal event. Admitted to medicine telemetry for rhabdomyolysis, AMS and multiple electrolyte derangements. Cardiology consulted for frequent PVC's.     REVIEW OF STUDIES:   ECG: Sinus tachycardia with bigeminy. Normal axis. No ischemic changes  Tele notable for bigeminy and frequent couplets and isolated PVCs  TTE 1/23: Normal LV function, no high gradients across LVOT ( 12mmHg) suggestive of LVOT obstruction, Grade 1 DD, mild MR, trace TR, PASP 32mmHg     #Asymptomatic PVC's   Normal LV function with frequent PVC's ( couplets/ bigeminy/ isolated PVC's)   She is asymptomatic at this time   Given ectopy burden, recommend PO metoprolol 12.5 q8h   Her PVC's are likely in the setting of electrolyte abnormalities/ rhabdomyolysis which are improving   Keep K > 4 and Magnesium > 2    #Troponin elevation   No active chest pain and non ischemic ECG  Trop elevation in the setting of rhabdomyolysis   Low suspicion for ACS event     Recommendations are final upon attending attestation

## 2024-01-23 NOTE — PHYSICAL THERAPY INITIAL EVALUATION ADULT - PERTINENT HX OF CURRENT PROBLEM, REHAB EVAL
Pt. is an 88 y.o female presents after unwitnessed fall/syncope. CT head, C-spine, pelvis - negative for acute pathology. Admitted for management of sepsis/TME/ rhabdo. Course complicated by cardiac arrhythmia and elevated Troponin.

## 2024-01-23 NOTE — H&P ADULT - PROBLEM SELECTOR PLAN 7
#Hypernatremia Possibly in the setting of multiple falls.  Pt is not on any anti-coagulants in home medications.  Extensive echymosis with various stages of healing on buttocks, back, LE and UE dorsal and ventral surfaces. Stage I Pressure ulcers on L and R buttocks and mid-thoracic region of trunk.   When inquiring regarding her feeling safe, she reports yes and denies any concerns or fears of harm to her by others. She reports not having much support, as her brother passed away last year and most of her loved ones have passed away.     -f/u platelets  -f/u PT/PTT/INR Upon arrival to [facility], EKG reads Sinus Tachycardia with PVCs ()  No prior history of Atrial fibrillation    -Metoprolol 2.5mg Q6 PRN  -f/u TTE

## 2024-01-23 NOTE — H&P ADULT - PROBLEM SELECTOR PLAN 8
Possibly in the setting of multiple falls.  Pt is not on any anti-coagulants in home medications.  Extensive echymosis with various stages of healing on buttocks, back, LE and UE dorsal and ventral surfaces. Stage I Pressure ulcers on L and R buttocks and mid-thoracic region of trunk.   When inquiring regarding her feeling safe, she reports yes and denies any concerns or fears of harm to her by others. She reports not having much support, as her brother passed away last year and most of her loved ones have passed away.     -f/u platelets  -f/u PT/PTT/INR 2/2 Fall.  CK elevated 4434>5183    -f/u CK in AM  -PT Eval

## 2024-01-23 NOTE — PROGRESS NOTE ADULT - PROBLEM SELECTOR PLAN 10
Home medication: Latanoprost eyedrops     -c/w home med Latanoprost TSH elevated at 8.727. FT4 WNL.     -c/w IV 80% home PO dose (70mcg QD)  -Pending Speech&Swallow eval, continue home Levothyroxine 88mcg PO TSH elevated at 8.727. FT4 WNL. Subclinical     - c/w IV 80% home PO dose (70mcg QD)  - continue home Levothyroxine 88mcg PO    #Glaucome  - continue home med Latanoprost 0.005% Fluid: D5 w/ 1AMP Bicarb  Electrolytes: replete PRN, cautions with hypernatremia correction as above.   Nutrition: soft bite sized  DVT ppx: lovenox  GI ppx: none for now   Code: FULL  Dispo: telemetry

## 2024-01-23 NOTE — ED ADULT NURSE REASSESSMENT NOTE - NS ED NURSE REASSESS COMMENT FT1
At approx 1300 charge nurse Yesenia was  notified by telemetry that pt had a brief period ov vtach x10. Pt remains asleep with even rise and fall of chest. Md notified no intervention at this time.

## 2024-01-23 NOTE — PATIENT PROFILE ADULT - LOCATION #4
The patient has been re-examined and I agree with the above assessment or I updated with my findings. sacrum

## 2024-01-23 NOTE — H&P ADULT - NSHPLABSRESULTS_GEN_ALL_CORE
13.7   24.18 )-----------( 219      ( 23 Jan 2024 03:44 )             40.9         01-23    150<H>  |  116<H>  |  44<H>  ----------------------------<  92  3.1<L>   |  15<L>  |  0.97    Ca    8.4      23 Jan 2024 03:44  Phos  3.8     01-23  Mg     2.2     01-23    TPro  5.9<L>  /  Alb  2.9<L>  /  TBili  0.9  /  DBili  x   /  AST  160<H>  /  ALT  49<H>  /  AlkPhos  119  01-23

## 2024-01-23 NOTE — PROGRESS NOTE ADULT - PROBLEM SELECTOR PLAN 5
Likely 2/2 Demand ischemia in the setting of hypoperfusion/sepsis  Troponins elevated although downtrending 212.6>190.5>188  EKG non-ischemic changes.    -Trend troponin Troponin elevated, plateauing. , EKG non-ischemic changes.  Likely 2/2 Demand ischemia in the setting of hypoperfusion/sepsis    - Trend troponin to peak POA with ventricular ectopy. No previous cardio history. Afib rule outed. Cardiology consulted.   Likely reactive.   TTE 1/23: normal b/l ventricular systolic function. mild MR.     - will continue to appreciate cardiology recs  - continue to monitor      #Elevated Troponin   Troponin elevated, plateauing. EKG non-ischemic changes.  Likely 2/2 Demand ischemia in the setting of hypoperfusion/sepsis    - Trend troponin to peak p/w hypernatremia 149 s/p fall and poor PO intake x 2 days. Most likely 2/2 hypovolemia, evident w/ concentrated urine. 2L Free water deficit around 2L. on 1L D5 w/ 1AMP bicarb @ 80cc/hr.     - correct free water deficit  - cautions with overcorrection (goal <0.5meq /hr)   - CMPs    #HAGMA - RESOLVED  #elevated serum lactate - RESOLVED  POA w/ HAGMA w/ most likely cause to be lactic acidosis. resolved w/ D5 w/ 1AMP bicarb

## 2024-01-23 NOTE — SWALLOW FEES ASSESSMENT ADULT - CONSISTENCIES ADMINISTERED
applesauce tsp administered to moisten regular solid due to patient report of inability to manage dry solid/thin liquid/mildly thick/pureed/regular solid

## 2024-01-23 NOTE — PROGRESS NOTE ADULT - PROBLEM SELECTOR PLAN 2
Etiology likely orthostatic (in the setting of hypovolemia vs. general deconditioning vs. position change) vs.  cardiogenic (in the setting of atrial fibrillation seen in ED). No diuretic use that may contribute to hypovolemia.   Less likely Neurocardiogenic given no clear trigger such as occuring with cough, defacation, micturition, emotional stress; No clear prodromes were present such as diaphoresis, nausea, blurry vision. No association with migraines/hx c/w SAH "worst headache of life"  No known history of CKD, Amylodosis, POTS. Per pt, episode did not occur with positional changes/ denies lightheadedness with sitting to standing.  Less likely other cardiovascular etiologies including mechanical/valvular/PE, not caused by exertion.  No tongue-biting or prior hx to suggest seizure (although per ED note, patient was found having urinated on himself). No hypoglycemia or hypoxia.    -f/u Orthostatic vitals  -Telemetry monitoring  -Repeat EKG  -maintenance fluids Etiology likely orthostatic (in the setting of hypovolemia vs. general deconditioning vs. position change) vs.  cardiogenic (in the setting of atrial fibrillation seen in ED). No diuretic use that may contribute to hypovolemia.   Less likely Neurocardiogenic given no clear trigger such as occuring with cough, defacation, micturition, emotional stress; No clear prodromes were present such as diaphoresis, nausea, blurry vision. No association with migraines/hx c/w SAH "worst headache of life"  No known history of CKD, Amylodosis, POTS. Per pt, episode did not occur with positional changes/ denies lightheadedness with sitting to standing.  Less likely other cardiovascular etiologies including mechanical/valvular/PE, not caused by exertion.  No tongue-biting or prior hx to suggest seizure (although per ED note, patient was found having urinated on himself). No hypoglycemia or hypoxia.    -f/u Orthostatic vitals  -Telemetry monitoring  -Repeat EKG  -maintenance fluids.    #r/o seizure  Reports R sided tongue pain on physical exam.     - vEEG Etiology likely orthostatic (in the setting of hypovolemia vs. general deconditioning vs. position change) vs.  cardiogenic (in the setting of atrial fibrillation seen in ED). No diuretic use that may contribute to hypovolemia.   Less likely Neurocardiogenic given no clear trigger such as occuring with cough, defacation, micturition, emotional stress; No clear prodromes were present such as diaphoresis, nausea, blurry vision. No association with migraines/hx c/w SAH "worst headache of life"  No known history of CKD, Amylodosis, POTS. Per pt, episode did not occur with positional changes/ denies lightheadedness with sitting to standing.  Less likely other cardiovascular etiologies including mechanical/valvular/PE, not caused by exertion.  No tongue-biting or prior hx to suggest seizure (although per ED note, patient was found having urinated on himself). No hypoglycemia or hypoxia.    -f/u Orthostatic vitals  -Telemetry monitoring  -Repeat EKG  -maintenance fluids. presents s/p fall. Pt with no recollection of event (and denies falls) but multiple sites of ecchymosis suggests fall.   Etiology unclear: suspect mechanical (?general deconditioning) vs orthostatic vs. less likely cardiogenic (no cardio Hx).    - f/u Orthostatic when reconditioned  - f/u PT  - Telemetry monitoring w/ EKGs      #Elevated CK - IMPROVING.   Likely 2/2 fall and immobility after. Peaked and downtrending s/p IVF.

## 2024-01-23 NOTE — SWALLOW BEDSIDE ASSESSMENT ADULT - SLP GENERAL OBSERVATIONS
Awake, alert, cooperative. Language skills intact for semi-complex conversation; voice is mod raspy at baseline

## 2024-01-23 NOTE — H&P ADULT - PROBLEM SELECTOR PLAN 2
Patient meeting 3/4 SIRS criteria (HR>90, WBC >12, lactate >2) w/o clear source.   No respiratory (cough, SOB), abdominal, or urinary complaints upon admission. No fever, fatigue, chills.  COVID/Influenza/RSV negative.  CTPE unremarkable.  -CTX 1g QD x5 days for empiric cystitic tx (1/23-1/27)  -f/u UA with Reflex Culture  -f/u Procalcitonin  -Trend lactate to clear.  -Avoid anti-cholinergics (can exacerbate urinary retention)  - Monitor WBC  - Monitor for fevers  - Monitor VS  - f/u BCx Etiology likely orthostatic (in the setting of hypovolemia vs. general deconditioning vs. position change) vs.  cardiogenic (in the setting of atrial fibrillation seen in ED). No diuretic use that may contribute to hypovolemia.   Less likely Neurocardiogenic given no clear trigger such as occuring with cough, defacation, micturition, emotional stress; No clear prodromes were present such as diaphoresis, nausea, blurry vision. No association with migraines/hx c/w SAH "worst headache of life"  No known history of CKD, Amylodosis, POTS. Per pt, episode did not occur with positional changes/ denies lightheadedness with sitting to standing.  Less likely other cardiovascular etiologies including mechanical/valvular/PE, not caused by exertion.  No tongue-biting or prior hx to suggest seizure (although per ED note, patient was found having urinated on himself). No hypoglycemia or hypoxia.    -f/u Orthostatic vitals  -Telemetry monitoring  -Repeat EKG  -maintenance fluids

## 2024-01-23 NOTE — SWALLOW BEDSIDE ASSESSMENT ADULT - SLP PERTINENT HISTORY OF CURRENT PROBLEM
87YO F with PMHx of Glaucoma (Latanoprost), Hypothyroidism, HTN presents from [facility] due to being found conscious on the ground in her apartment, found to be in Atrial fibrillation with RVR and meeting SIRS criteria, now admitted to telemetry for work-up of syncope

## 2024-01-23 NOTE — SWALLOW FEES ASSESSMENT ADULT - COMMENTS
Risks/benefits/alternatives of FEES were explained to the patient who provided verbal consent to participate. She tolerated the passing and presence of the scope without difficulty. This exam was performed in coordination with Tiny Jose, SLP, who provided feeding assistance. Reduced bilateral pharyngeal squeeze  Bilateral VF mobility, though incomplete glottic closure noted   Hyperfunction/compensation of the FVF visualized bilaterally for phonation   Breathy and strained vocal quality noted   Symmetrical BOT movement   Diffuse dried thick secretions visualized throughout pharynx, eventually reduced with ice chips.

## 2024-01-23 NOTE — PROGRESS NOTE ADULT - PROBLEM SELECTOR PLAN 6
Home meds: Levothyroxine 88mcg QD  TSH elevated at 8.727    -c/w IV 80% home PO dose (70mcg QD)  -Pending Speech&Swallow eval, switch to PO Levothyroxin 88mcg QD  -f/u BMP POA Extensive echymosis with various stages of healing on buttocks, back, LE and UE dorsal and ventral surfaces. Stage I Pressure ulcers on L and R buttocks and mid-thoracic region of trunk.   Reports minimal to no support at home.  CTAP noncon 1/22: no evidence of hematoma / fractures.   Most in the setting of falls. No AC medication.     - monitor POA with ventricular ectopy. No previous cardio history. Afib rule outed. Cardiology consulted.   Likely 2/2 electrolyte abnormalities.   TTE 1/23: normal b/l ventricular systolic function. mild MR.     - will continue to appreciate cardiology recs  - continue to monitor      #Elevated Troponin   Troponin elevated, plateauing. EKG non-ischemic changes.  Likely 2/2 Demand ischemia in the setting of hypoperfusion/sepsis    - Trend troponin to peak

## 2024-01-23 NOTE — SWALLOW BEDSIDE ASSESSMENT ADULT - PHARYNGEAL PHASE
Hyolaryngeal excursion palpated during swallow trigger. +immediate cough on 2nd tsp of water, then intermittently after remaining PO trials and Pt c/o globus after tsp trial of puree

## 2024-01-23 NOTE — H&P ADULT - PROBLEM SELECTOR PLAN 6
Upon arrival to [facility], EKG reads Sinus Tachycardia with PVCs ()  No prior history of Atrial fibrillation    -Metoprolol 2.5mg Q6 PRN  -f/u TTE Home meds: Levothyroxine 88mcg QD  TSH elevated at 8.727    -c/w IV 80% home PO dose (70mcg QD)  -Pending Speech&Swallow eval, switch to PO Levothyroxin 88mcg QD  -f/u BMP

## 2024-01-23 NOTE — ED ADULT NURSE REASSESSMENT NOTE - NS ED NURSE REASSESS COMMENT FT1
Primary RN Jordon attempted to call unit to give RN to RN report for pt admission, ROSARIO Ruvalcaba states she attempted to contact receiving RN three times on unit [facility]. AUNDREA Ibrahim attempted to call [facility] ANM and AND, neither answered calls.

## 2024-01-23 NOTE — PHYSICAL THERAPY INITIAL EVALUATION ADULT - ASSISTIVE DEVICE FOR TRANSFER: STAND/SIT, REHAB EVAL
Painful Urination (Dysuria): Care Instructions  Your Care Instructions  Burning pain with urination (dysuria) is a common symptom of a urinary tract infection or other urinary problems. The bladder may become inflamed. This can cause pain when the bladder fills and empties. You may also feel pain if the tube that carries urine from the bladder to the outside of the body (urethra) gets irritated or infected. Sexually transmitted infections (STIs) also may cause pain when you urinate. Sometimes the pain can be caused by things other than an infection. The urethra can be irritated by soaps, perfumes, or foreign objects in the urethra. Kidney stones can cause pain when they pass through the urethra. The cause may be hard to find. You may need tests. Treatment for painful urination depends on the cause. Follow-up care is a key part of your treatment and safety. Be sure to make and go to all appointments, and call your doctor if you are having problems. It's also a good idea to know your test results and keep a list of the medicines you take. How can you care for yourself at home? · Drink extra water for the next day or two. This will help make the urine less concentrated. (If you have kidney, heart, or liver disease and have to limit fluids, talk with your doctor before you increase the amount of fluids you drink.)  · Avoid drinks that are carbonated or have caffeine. They can irritate the bladder. · Urinate often. Try to empty your bladder each time. For women:  · Urinate right after you have sex. · After going to the bathroom, wipe from front to back. · Avoid douches, bubble baths, and feminine hygiene sprays. And avoid other feminine hygiene products that have deodorants. When should you call for help? Call your doctor now or seek immediate medical care if:    · You have new symptoms, such as fever, nausea, or vomiting.     · You have new or worse symptoms of a urinary problem. For example:  ?  You have blood or pus in your urine. ? You have chills or body aches. ? It hurts worse to urinate. ? You have groin or belly pain. ? You have pain in your back just below your rib cage (the flank area).    Watch closely for changes in your health, and be sure to contact your doctor if you have any problems. Where can you learn more? Go to http://les-hitesh.info/. Enter B811 in the search box to learn more about \"Painful Urination (Dysuria): Care Instructions. \"  Current as of: March 20, 2018  Content Version: 11.9  © 0222-7108 [employer]. Care instructions adapted under license by [employer] (which disclaims liability or warranty for this information). If you have questions about a medical condition or this instruction, always ask your healthcare professional. [place] [address] any warranty or liability for your use of this information. Urinary Tract Infection in Women: Care Instructions  Your Care Instructions    A urinary tract infection, or UTI, is a general term for an infection anywhere between the kidneys and the urethra (where urine comes out). Most UTIs are bladder infections. They often cause pain or burning when you urinate. UTIs are caused by bacteria and can be cured with antibiotics. Be sure to complete your treatment so that the infection goes away. Follow-up care is a key part of your treatment and safety. Be sure to make and go to all appointments, and call your doctor if you are having problems. It's also a good idea to know your test results and keep a list of the medicines you take. How can you care for yourself at home? · Take your antibiotics as directed. Do not stop taking them just because you feel better. You need to take the full course of antibiotics. · Drink extra water and other fluids for the next day or two. This may help wash out the bacteria that are causing the infection.  (If you have kidney, heart, or liver disease and have to limit fluids, talk with your doctor before you increase your fluid intake.)  · Avoid drinks that are carbonated or have caffeine. They can irritate the bladder. · Urinate often. Try to empty your bladder each time. · To relieve pain, take a hot bath or lay a heating pad set on low over your lower belly or genital area. Never go to sleep with a heating pad in place. To prevent UTIs  · Drink plenty of water each day. This helps you urinate often, which clears bacteria from your system. (If you have kidney, heart, or liver disease and have to limit fluids, talk with your doctor before you increase your fluid intake.)  · Urinate when you need to. · Urinate right after you have sex. · Change sanitary pads often. · Avoid douches, bubble baths, feminine hygiene sprays, and other feminine hygiene products that have deodorants. · After going to the bathroom, wipe from front to back. When should you call for help? Call your doctor now or seek immediate medical care if:    · Symptoms such as fever, chills, nausea, or vomiting get worse or appear for the first time.     · You have new pain in your back just below your rib cage. This is called flank pain.     · There is new blood or pus in your urine.     · You have any problems with your antibiotic medicine.    Watch closely for changes in your health, and be sure to contact your doctor if:    · You are not getting better after taking an antibiotic for 2 days.     · Your symptoms go away but then come back. Where can you learn more? Go to http://les-hitesh.info/. Enter Y957 in the search box to learn more about \"Urinary Tract Infection in Women: Care Instructions. \"  Current as of: March 20, 2018  Content Version: 11.9  © 8341-4132 [employer]. Care instructions adapted under license by "[employer] (which disclaims liability or warranty for this information).  If you have questions about a medical condition or this instruction, always ask your healthcare professional. [place] [address] any warranty or liability for your use of this information. rolling walker

## 2024-01-23 NOTE — PROGRESS NOTE ADULT - PROBLEM SELECTOR PLAN 3
Patient meeting 3/4 SIRS criteria (HR>90, WBC >12, lactate >2) w/o clear source.   No respiratory (cough, SOB), abdominal, or urinary complaints upon admission. No fever, fatigue, chills.  COVID/Influenza/RSV negative.  CTPE unremarkable.  -CTX 1g QD x5 days for empiric cystitic tx (1/23-1/27)  -f/u UA with Reflex Culture  -f/u Procalcitonin  -Trend lactate to clear.  -Avoid anti-cholinergics (can exacerbate urinary retention)  - Monitor WBC  - Monitor for fevers  - Monitor VS  - f/u BCx p/w AMS, failed dysphagia overnight. Per family bedside voice is more hoarse than baseline.   SS consulted. FEES 1/23 Severe pharyngeal dysphagia. However vocal ford found to be inadequately closing. recs ENT    - aspiration precautions.   - soft, bite sized w/ thin liquid per SS recs.   - f/u ENT consult for ? vocal cord dysfunction

## 2024-01-23 NOTE — SWALLOW FEES ASSESSMENT ADULT - ADDITIONAL RECOMMENDATIONS
LT: Patient will safely tolerate the least restrictive diet without overt s/s of penetration/aspiration or changes in pulmonary status.

## 2024-01-23 NOTE — H&P ADULT - NSHPPHYSICALEXAM_GEN_ALL_CORE
GENERAL: NAD. Resting comfortably in bed. AxOx3. Hard of hearing.  HEENT: NCAT. R pupil dilated and minimally reactive to light. EOMI. Dry MM. No nasal erythema/drainage. No tonsillar exudates/erythema.   CARDIO: +S1/S2. RRR. Crescendo-decrescendo murmur in L ICS. No murmurs, rubs, or gallops.  LUNGS: CTA B/L. No wheezes, rhonchi, or rales.  ABDOMEN: Soft NTND. Bowel sounds present. No palpable masses.  EXTREMITIES: No clubbing, cyanosis, or edema.  VASCULAR: Radial pulses 2+ B/L. Dorsalis pedis 1+ B/L.  NEURO: Sensation intact.  MSK: Muscle strength 4/5 B/L LE.  SKIN: Extensive echymosis with various stages of healing on buttocks, trunk, dorsal and ventral aspects of UE and LE B/L. Stage I Pressure ulcers on L and R buttocks and mid-thoracic region of trunk. +Swelling and erythema of L hand. GENERAL: NAD. Resting comfortably in bed. AxOx3. Hard of hearing.  HEENT: NCAT. R pupil dilated and minimally reactive to light. Clear/white crusted coating of eyelashes with watery discharge of R eye, but no conjunctival injection. EOMI. Dry MM. No nasal erythema/drainage. No tonsillar exudates/erythema.   CARDIO: +S1/S2. RRR. Crescendo-decrescendo murmur in L ICS. No murmurs, rubs, or gallops.  LUNGS: CTA B/L. No wheezes, rhonchi, or rales.  ABDOMEN: Soft NTND. Bowel sounds present. No palpable masses.  EXTREMITIES: No clubbing, cyanosis, or edema.  VASCULAR: Radial pulses 2+ B/L. Dorsalis pedis 1+ B/L.  NEURO: Sensation intact.  MSK: Muscle strength 4/5 B/L LE.  SKIN: Extensive echymosis with various stages of healing on buttocks, trunk, dorsal and ventral aspects of UE and LE B/L. Stage I Pressure ulcers on L and R buttocks and mid-thoracic region of trunk. +Swelling and erythema of L hand. GENERAL: NAD. Resting comfortably in bed. AxOx3. Hard of hearing.  HEENT: NCAT. R pupil dilated and minimally reactive to light. Clear/white crusted coating of eyelashes with watery discharge of R eye, but no conjunctival injection. EOMI. Dry MM. No nasal erythema/drainage. No tonsillar exudates/erythema.   CARDIO: +S1/S2. RRR. Crescendo-decrescendo murmur in L ICS. No murmurs, rubs, or gallops.  LUNGS: CTA B/L. No wheezes, rhonchi, or rales.  ABDOMEN: Soft NTND. Bowel sounds present. No palpable masses.  EXTREMITIES: No clubbing, cyanosis, or edema.  VASCULAR: Radial pulses 2+ B/L. Dorsalis pedis 1+ B/L.  NEURO: Sensation intact.  MSK: Muscle strength 4/5 B/L LE.  SKIN: Extensive ecchymosis with various stages of healing on buttocks, trunk, dorsal and ventral aspects of UE and LE B/L. Stage I Pressure ulcers on L and R buttocks and mid-thoracic region of trunk. +Swelling and erythema of L hand. GENERAL: NAD. Resting comfortably in bed. AxOx3. Hard of hearing.  HEENT: NCAT. R pupil dilated and minimally reactive to light. Clear/white crusted coating of eyelashes with watery discharge of R eye, but no conjunctival injection. EOMI. Dry MM. No nasal erythema/drainage. No tonsillar exudates/erythema.   CARDIO: +S1/S2. RRR. Crescendo-decrescendo murmur in L 2nd ICS. No murmurs, rubs, or gallops.  LUNGS: CTA B/L. No wheezes, rhonchi, or rales.  ABDOMEN: Soft NTND. Bowel sounds present. No palpable masses.  EXTREMITIES: No clubbing, cyanosis, or edema.  VASCULAR: Radial pulses 2+ B/L. Dorsalis pedis 1+ B/L.  NEURO: Sensation intact.  MSK: Muscle strength 4/5 B/L LE.  SKIN: Extensive ecchymosis with various stages of healing on buttocks, trunk, dorsal and ventral aspects of UE and LE B/L. Stage I Pressure ulcers on L and R buttocks and mid-thoracic region of trunk. +Swelling and erythema of L hand.

## 2024-01-23 NOTE — H&P ADULT - PROBLEM SELECTOR PLAN 11
Likely in the setting of NS administration vs. lactate    -CTM Likely in the setting of NS administration vs. lactic acidosis (Lactate 3.7)  AG 19 (high), HCO3 15 (low)    -CTM

## 2024-01-23 NOTE — SWALLOW BEDSIDE ASSESSMENT ADULT - SWALLOW EVAL: DIAGNOSIS
Suspect at least a mild pharyngeal dysphagia AEB +intermittent cough with liquid trials & Pt c/o feeling applesauce stick in her throat after single tsp trials.

## 2024-01-23 NOTE — SWALLOW FEES ASSESSMENT ADULT - RECOMMENDED FEEDING/EATING TECHNIQUES
intentional hold+swallow with thin liquids/maintain upright posture during/after eating for 30 mins/oral hygiene/position upright (90 degrees)

## 2024-01-23 NOTE — SWALLOW FEES ASSESSMENT ADULT - ESOPHAGEAL PHASE
Delayed pharyngeal swallow/laryngeal vestibule closure resulted in intermittent penetration before the swallow with thin liquids, most apparent with initial trial. Reduced hyolaryngeal movement and incomplete laryngeal vestibule closure inferred due to incomplete white out. Penetration was deemed chronic, however, primarily occurred after the swallow from post cricoid residual and intermittently from vallecular residue. Patient was consistently sensate with a productive throat clear/cough response. Similar presentation with mildly thick liquids. Implementation of an intentional bolus hold+swallow the entire bolus improved timeliness of the swallow and clearance with thin liquids. Inferred reduced BOT retraction and pharyngeal squeeze resulted in mild residual in the vallecula and lateral channels with purees, reduced with liquid wash. No penetration/aspiration visualized with purees and solids.     Ice chips trials administered initially and facilitated clearance of dried secretions. Delayed pharyngeal swallow/laryngeal vestibule closure resulted in intermittent penetration before the swallow with thin liquids, most apparent with initial trial. Reduced hyolaryngeal movement and incomplete laryngeal vestibule closure inferred due to incomplete white out. Additionally, reduced UES opening associated with reduced hyolaryngeal movement resulted in mild (10-33%) hypopharyngeal residue with thin liquids. Penetration was deemed chronic, however, primarily occurred after the swallow from post cricoid residual and intermittently from vallecular residue. Patient was consistently sensate with a productive throat clear/cough response. Similar presentation with mildly thick liquids. Implementation of an intentional bolus hold+swallow the entire bolus improved timeliness of the swallow and clearance with thin liquids. Mild pharyngeal retention in the vallecula and lateral channels was also visualized with purees, though <10% associated with reduced BOT retraction and pharyngeal squeeze. Liquid wash effective in clearing pharyngeal residual. No penetration/aspiration visualized with purees and solids.     Ice chips trials administered initially and facilitated clearance of dried secretions.

## 2024-01-23 NOTE — H&P ADULT - PROBLEM SELECTOR PLAN 12
F: LR Maintenance fluids  E: Replete as necessary, with goal K>4 and Mg>2  N: NPO pending Speech/Swallow eval  DVT: None  DISPO: Telemetry F: LR  E: Replete as necessary, with goal K>4 and Mg>2  N: NPO   DVT: None  DISPO: Telemetry  CODE: DNR

## 2024-01-23 NOTE — PROGRESS NOTE ADULT - PROBLEM SELECTOR PLAN 8
Possibly in the setting of multiple falls.  Pt is not on any anti-coagulants in home medications.  Extensive echymosis with various stages of healing on buttocks, back, LE and UE dorsal and ventral surfaces. Stage I Pressure ulcers on L and R buttocks and mid-thoracic region of trunk.   When inquiring regarding her feeling safe, she reports yes and denies any concerns or fears of harm to her by others. She reports not having much support, as her brother passed away last year and most of her loved ones have passed away.     -f/u platelets  -f/u PT/PTT/INR Likely in the setting of NS administration vs. lactic acidosis (Lactate 3.7)  AG 19 (high), HCO3 15 (low) 2/2 Fall.  CK elevated [id number]    -f/u CK in AM  -PT Eval      #HTN  Home medication: Ramapril (unknown dose to pt)    -hold home med for now given NPO status Most likely multifactorial: AST>ALT 2:1 suggesting ETOH etiology. ALP elevated but likely 2/2 fall. Hypovolemic hypoperfusion also contributive.       - CMPs

## 2024-01-23 NOTE — SWALLOW BEDSIDE ASSESSMENT ADULT - COMMENTS
Per RN, Pt had immediate cough on tsp trial water during overnight dysphagia screen, appears to have improved alertness & mentation today.

## 2024-01-23 NOTE — PROGRESS NOTE ADULT - PROBLEM SELECTOR PLAN 7
Upon arrival to [facility], EKG reads Sinus Tachycardia with PVCs ()  No prior history of Atrial fibrillation    -Metoprolol 2.5mg Q6 PRN  -f/u TTE 2/2 Fall.  CK elevated 4434>5183    -f/u CK in AM  -PT Eval      #HTN  Home medication: Ramapril (unknown dose to pt)    -hold home med for now given NPO status AST>ALT 2:1 ratio 2/2 alcohol use  216/56, Alkaline phosphatase 129 (high)   Pt reports drinking 1 oz vodka 1x/week    -Trend LFTs  -f/u Acetaminophen level  -f/u Salicyclate level p/w extensive ecchymosis with various stages of healing on buttocks, back, LE and UE dorsal and ventral surfaces. Stage I Pressure ulcers on L and R buttocks and mid-thoracic region of trunk.   Reports minimal to no support at home.  CTAP noncon 1/22: no evidence of hematoma / fractures.   Most in the setting of falls. No AC medication.

## 2024-01-23 NOTE — SWALLOW FEES ASSESSMENT ADULT - THE ABOVE FINDINGS WERE DISCUSSED WITH
Niece/HCP and brother education re: FEES results, diet recommendations, and plan of care/Physician/Nursing/Patient/Family

## 2024-01-23 NOTE — PROGRESS NOTE ADULT - PROBLEM SELECTOR PLAN 4
AST>ALT 2:1 ratio 2/2 alcohol use  216/56, Alkaline phosphatase 129 (high)   Pt reports drinking 1 oz vodka 1x/week    -Trend LFTs  -f/u Acetaminophen level  -f/u Salicyclate level Patient meeting 3/4 SIRS criteria (HR>90, WBC >12, lactate >2) w/o clear source.   No respiratory (cough, SOB), abdominal, or urinary complaints upon admission. No fever, fatigue, chills.  COVID/Influenza/RSV negative.  CTPE unremarkable.  -CTX 1g QD x5 days for empiric cystitic tx (1/23-1/27)  -f/u UA with Reflex Culture  -f/u Procalcitonin  -Trend lactate to clear.  -Avoid anti-cholinergics (can exacerbate urinary retention)  - Monitor WBC  - Monitor for fevers  - Monitor VS  - f/u BCx POA w/ 2/4 SIRS (HR, WBC) w/ elevated lactate. Lactate resolved s/p IVF. No clear infectious source.   COVID/Influenza/RSV negative. UA shows few bacteria but WBC <10, neg LE/ nitrate. Leukocytosis downtrending (although elevated WBC can also be confounded by possible Dx of MM). s/p CTX  CTPE, CTAP unremarkable.    DDx: Reactive vs. infectious (most likely UTI)     - Transition to zosyn 4.5 empiric given AMS.   - f/u UCx, BCx   - f/u Procalcitonin  - labs, VS monitoring    #possible MM  pt is currently being worked up for MM per outpt.    - obtain collateral outpt cardiologist/ PCP Dr. Surendra Blanchard.

## 2024-01-23 NOTE — SWALLOW FEES ASSESSMENT ADULT - SLP PERTINENT HISTORY OF CURRENT PROBLEM
PMH of glaucoma (Latanoprost), hypothyroidism, HTN, worked up MM, presented to ED due to being found conscious on the ground in her apartment, now admitted to telemetry on 1/22/24 for work-up of syncope, altered mental status, and further monitoring.

## 2024-01-23 NOTE — PROGRESS NOTE ADULT - PROBLEM SELECTOR PLAN 1
#Dehydration  #Hypernatremia    DDx includes: Toxins (in the setting of alcohol use) vs. infectious etiology (meets severe sepsis criteria as below) vs. Uremic (BUN 51) vs. Metabolic (given hypothyroidism, electrolyte derangements and elevated TSH 8.727)  CT Head negative for intracranial acute pathology, therefore low suspicion for stroke and subarachnoid hemorrhage as etiology.  Less likely anticholinergic toxidrome given no medications that would suggest this and no decreased bowel sounds, hyperthermia, flushing of skin although pt is somewhat agitated.  Less likely adrenal crisis, given no hypoglycemia as above, No weakness (Muscle strength 4/5 B/L LE on exam), severe fatigue, unintentional weight loss, nausea, vomiting, abdominal pain, reduced appetite, back or limb pain although had initial loss of consciousness. Although some metabolic derangements seen as above.  Pt does not recall very much prior to or after the incident leading up to being in [facility] ED.   No tongue fasciculations present on ED or reported urinary incontinence or known seizure hx that may suggest Seizure as etiology of presentation.  CTPE negative. CT Hip reveals Degenerative changes of the lumbar spine and pelvis with no fractures. CT C/A/P No Contrast No definite evidence of solid visceral injury in the chest, abdomen or pelvis within the limits of this noncontrast study. CT Head and Cervical spine No Cont negative.    -f/u CT Head   -f/u Urine Drug Screen  -f/u Ammonia level  -do not give opioids for pain regimen  -IV Fluid Bolus (2L) then bolus as needed.  -Speech&Swallow Evaluation  -PT Evaluation POA with AMS. Found down after 2 days after possible fall at home. UTox, salicylate, acetaminophen neg. However c/o R lateral tongue pain.   CTH: neg for acute pathology. neurological causes unlikely.     DDx: infectious (meets severe sepsis criteria as below) vs. Uremic (BUN 51) vs. Metabolic hypothyroidism vs electrolyte derangements) vs. seizure.     - vEEG r/o seizure  - PT Evaluation POA with AMS. Found down after 2 days after likely fall at home. UTox, salicylate, acetaminophen neg. However c/o R lateral tongue pain.   CTH: neg for acute pathology. neurological causes unlikely.     DDx: infectious (meets severe sepsis criteria as below) vs. Uremic (BUN 51) vs. Metabolic hypothyroidism vs electrolyte derangements) vs. seizure.     - vEEG r/o seizure  - correct electrolyte  - empiric Abx: Zosyn 4.5 q8.

## 2024-01-23 NOTE — PHYSICAL THERAPY INITIAL EVALUATION ADULT - MODALITIES TREATMENT COMMENTS
Pt. currently presenting with limited endurance, impaired standing balance, decreased strength; increased unsteadiness of gait. Gait training was further limited by pt's anxiety and fear of falling.

## 2024-01-23 NOTE — H&P ADULT - ATTENDING COMMENTS
Jovita Overton  [id number]      [facility] -> [place]  This is an 87 y/o female with hypothyroidism and HTN who was not seen x 2days was found down at her home.  Her T95.6F now normal, was given 1L IVF, Levaquin and 0.5mg ativan.  As per her relatives she is confuse.  She has multiple ecchymosis, CT head (-), CT chest (+) emphysema, CHC 28, trop 212 ->190, LFT elevated, BNP>2K, covid and flu(-), EKG shows Afib.  >AMS with confusion  -SIRS, no focus of infection  -Afib with RVR  -dehydration, hypernatremia  -elevated trop  -elevated CPK  -increased transaminase  elevated lactate  >tox screen, ammonia, Tylenol, Salicylate levels  >Lopressor for HR control  >IVF bolus and maintenance fluids  >empiric antibiotics, follow blood culture and de escalate  >f/u T4, c/w levothyroxine  >f/u mag, phos, lactate  >DVT prophylaxis    Time 55 mins

## 2024-01-23 NOTE — H&P ADULT - PROBLEM SELECTOR PLAN 4
Likely 2/2 Demand ischemia in the setting of hypoperfusion/sepsis  Troponins elevated although downtrending 212.6>190.5>188  EKG non-ischemic changes.    -Trend troponin AST>ALT 2:1 ratio 2/2 alcohol use  216/56, Alkaline phosphatase 129 (high)   Pt reports drinking 1 oz vodka 1x/week    -Trend LFTs  -f/u Acetaminophen level  -f/u Salicyclate level

## 2024-01-23 NOTE — SWALLOW FEES ASSESSMENT ADULT - ORAL PHASE COMMENTS
Clinically, adequate oral containment, mildly prolonged mastication with solids, seemingly functional bolus manipulation, and functional oral clearance.

## 2024-01-23 NOTE — H&P ADULT - PROBLEM SELECTOR PLAN 1
#Dehydration #Dehydration    DDx includes: Toxins (in the setting of alcohol use) vs. infectious etiology (meets severe sepsis criteria as below)  Less likely anticholinergic toxidrome given no medications that would suggest this and no decreased bowel sounds, hyperthermia, flushing of skin although pt is somewhat agitated.  Less likely uremic, No hyper/hypoglycemia present, No other metabolic derangements present.  Less likely adrenal crisis, given no hypoglycemia as above, metabolic derangements as above (hyperkalemia, hyponatremia. No weakness (Muscle strength 4/5 B/L LE on exam), severe fatigue, unintentional weight loss, nausea, vomiting, abdominal pain, reduced appetite, back or limb pain although had initial loss of consciousness.  Pt does not recall very much prior to or after the incident leading up to being in [facility] ED.   No tongue fasciculations present on ED or reported urinary incontinence or known seizure hx that may suggest Seizure as etiology of presentation.    -f/u CT Head   -f/u Urine Drug Screen  -f/u Ammonia level  -do not give opioids for pain regimen  -IV Fluid Bolus (2L) then bolus as needed.  -Speech&Swallow Evaluation  -PT Evaluation #Dehydration    DDx includes: Toxins (in the setting of alcohol use) vs. infectious etiology (meets severe sepsis criteria as below) vs. Uremic (BUN 51) vs. Metabolic (given hypothyroidism, electrolyte derangements and elevated TSH 8.727)  Less likely anticholinergic toxidrome given no medications that would suggest this and no decreased bowel sounds, hyperthermia, flushing of skin although pt is somewhat agitated.  Less likely adrenal crisis, given no hypoglycemia as above, No weakness (Muscle strength 4/5 B/L LE on exam), severe fatigue, unintentional weight loss, nausea, vomiting, abdominal pain, reduced appetite, back or limb pain although had initial loss of consciousness. Athough some metabolic derangements seen as above.  Pt does not recall very much prior to or after the incident leading up to being in [facility] ED.   No tongue fasciculations present on ED or reported urinary incontinence or known seizure hx that may suggest Seizure as etiology of presentation.    -f/u CT Head   -f/u Urine Drug Screen  -f/u Ammonia level  -do not give opioids for pain regimen  -IV Fluid Bolus (2L) then bolus as needed.  -Speech&Swallow Evaluation  -PT Evaluation #Dehydration    DDx includes: Toxins (in the setting of alcohol use) vs. infectious etiology (meets severe sepsis criteria as below) vs. Uremic (BUN 51) vs. Metabolic (given hypothyroidism, electrolyte derangements and elevated TSH 8.727)  Less likely anticholinergic toxidrome given no medications that would suggest this and no decreased bowel sounds, hyperthermia, flushing of skin although pt is somewhat agitated.  Less likely adrenal crisis, given no hypoglycemia as above, No weakness (Muscle strength 4/5 B/L LE on exam), severe fatigue, unintentional weight loss, nausea, vomiting, abdominal pain, reduced appetite, back or limb pain although had initial loss of consciousness. Although some metabolic derangements seen as above.  Pt does not recall very much prior to or after the incident leading up to being in [facility] ED.   No tongue fasciculations present on ED or reported urinary incontinence or known seizure hx that may suggest Seizure as etiology of presentation.    -f/u CT Head   -f/u Urine Drug Screen  -f/u Ammonia level  -do not give opioids for pain regimen  -IV Fluid Bolus (2L) then bolus as needed.  -Speech&Swallow Evaluation  -PT Evaluation #Dehydration    DDx includes: Toxins (in the setting of alcohol use) vs. infectious etiology (meets severe sepsis criteria as below) vs. Uremic (BUN 51) vs. Metabolic (given hypothyroidism, electrolyte derangements and elevated TSH 8.727)  Less likely anticholinergic toxidrome given no medications that would suggest this and no decreased bowel sounds, hyperthermia, flushing of skin although pt is somewhat agitated.  Less likely adrenal crisis, given no hypoglycemia as above, No weakness (Muscle strength 4/5 B/L LE on exam), severe fatigue, unintentional weight loss, nausea, vomiting, abdominal pain, reduced appetite, back or limb pain although had initial loss of consciousness. Although some metabolic derangements seen as above.  Pt does not recall very much prior to or after the incident leading up to being in [facility] ED.   No tongue fasciculations present on ED or reported urinary incontinence or known seizure hx that may suggest Seizure as etiology of presentation.  CTPE negative. CT Hip reveals Degenerative changes of the lumbar spine and pelvis with no fractures. CT C/A/P No Contrast No definite evidence of solid visceral injury in the chest, abdomen or pelvis within the limits of this noncontrast study. CT Head and Cervical spine No Cont negative.    -f/u CT Head   -f/u Urine Drug Screen  -f/u Ammonia level  -do not give opioids for pain regimen  -IV Fluid Bolus (2L) then bolus as needed.  -Speech&Swallow Evaluation  -PT Evaluation #Dehydration    DDx includes: Toxins (in the setting of alcohol use) vs. infectious etiology (meets severe sepsis criteria as below) vs. Uremic (BUN 51) vs. Metabolic (given hypothyroidism, electrolyte derangements and elevated TSH 8.727)  CT Head negative for intracranial acute pathology, therefore low suspicion for stroke and subarachnoid hemorrhage as etiology.  Less likely anticholinergic toxidrome given no medications that would suggest this and no decreased bowel sounds, hyperthermia, flushing of skin although pt is somewhat agitated.  Less likely adrenal crisis, given no hypoglycemia as above, No weakness (Muscle strength 4/5 B/L LE on exam), severe fatigue, unintentional weight loss, nausea, vomiting, abdominal pain, reduced appetite, back or limb pain although had initial loss of consciousness. Although some metabolic derangements seen as above.  Pt does not recall very much prior to or after the incident leading up to being in [facility] ED.   No tongue fasciculations present on ED or reported urinary incontinence or known seizure hx that may suggest Seizure as etiology of presentation.  CTPE negative. CT Hip reveals Degenerative changes of the lumbar spine and pelvis with no fractures. CT C/A/P No Contrast No definite evidence of solid visceral injury in the chest, abdomen or pelvis within the limits of this noncontrast study. CT Head and Cervical spine No Cont negative.    -f/u CT Head   -f/u Urine Drug Screen  -f/u Ammonia level  -do not give opioids for pain regimen  -IV Fluid Bolus (2L) then bolus as needed.  -Speech&Swallow Evaluation  -PT Evaluation #Dehydration  #Hypernatremia    DDx includes: Toxins (in the setting of alcohol use) vs. infectious etiology (meets severe sepsis criteria as below) vs. Uremic (BUN 51) vs. Metabolic (given hypothyroidism, electrolyte derangements and elevated TSH 8.727)  CT Head negative for intracranial acute pathology, therefore low suspicion for stroke and subarachnoid hemorrhage as etiology.  Less likely anticholinergic toxidrome given no medications that would suggest this and no decreased bowel sounds, hyperthermia, flushing of skin although pt is somewhat agitated.  Less likely adrenal crisis, given no hypoglycemia as above, No weakness (Muscle strength 4/5 B/L LE on exam), severe fatigue, unintentional weight loss, nausea, vomiting, abdominal pain, reduced appetite, back or limb pain although had initial loss of consciousness. Although some metabolic derangements seen as above.  Pt does not recall very much prior to or after the incident leading up to being in [facility] ED.   No tongue fasciculations present on ED or reported urinary incontinence or known seizure hx that may suggest Seizure as etiology of presentation.  CTPE negative. CT Hip reveals Degenerative changes of the lumbar spine and pelvis with no fractures. CT C/A/P No Contrast No definite evidence of solid visceral injury in the chest, abdomen or pelvis within the limits of this noncontrast study. CT Head and Cervical spine No Cont negative.    -f/u CT Head   -f/u Urine Drug Screen  -f/u Ammonia level  -do not give opioids for pain regimen  -IV Fluid Bolus (2L) then bolus as needed.  -Speech&Swallow Evaluation  -PT Evaluation [place] Counseling:  I discussed with the patient the risks of imiquimod including but not limited to erythema, scaling, itching, weeping, crusting, and pain.  Patient understands that the inflammatory response to imiquimod is variable from person to person and was educated regarded proper titration schedule.  If flu-like symptoms develop, patient knows to discontinue the medication and contact us.

## 2024-01-24 DIAGNOSIS — I48.91 UNSPECIFIED ATRIAL FIBRILLATION: ICD-10-CM

## 2024-01-24 DIAGNOSIS — G93.41 METABOLIC ENCEPHALOPATHY: ICD-10-CM

## 2024-01-24 DIAGNOSIS — K92.1 MELENA: ICD-10-CM

## 2024-01-24 LAB
ALBUMIN SERPL ELPH-MCNC: 2.7 G/DL — LOW (ref 3.3–5)
ALP SERPL-CCNC: 88 U/L — SIGNIFICANT CHANGE UP (ref 40–120)
ALT FLD-CCNC: 42 U/L — SIGNIFICANT CHANGE UP (ref 10–45)
ANION GAP SERPL CALC-SCNC: 10 MMOL/L — SIGNIFICANT CHANGE UP (ref 5–17)
ANION GAP SERPL CALC-SCNC: 11 MMOL/L — SIGNIFICANT CHANGE UP (ref 5–17)
ANION GAP SERPL CALC-SCNC: 8 MMOL/L — SIGNIFICANT CHANGE UP (ref 5–17)
ANION GAP SERPL CALC-SCNC: 9 MMOL/L — SIGNIFICANT CHANGE UP (ref 5–17)
ANION GAP SERPL CALC-SCNC: 9 MMOL/L — SIGNIFICANT CHANGE UP (ref 5–17)
AST SERPL-CCNC: 93 U/L — HIGH (ref 10–40)
BILIRUB SERPL-MCNC: 0.8 MG/DL — SIGNIFICANT CHANGE UP (ref 0.2–1.2)
BLD GP AB SCN SERPL QL: NEGATIVE — SIGNIFICANT CHANGE UP
BUN SERPL-MCNC: 22 MG/DL — SIGNIFICANT CHANGE UP (ref 7–23)
BUN SERPL-MCNC: 24 MG/DL — HIGH (ref 7–23)
BUN SERPL-MCNC: 25 MG/DL — HIGH (ref 7–23)
BUN SERPL-MCNC: 25 MG/DL — HIGH (ref 7–23)
BUN SERPL-MCNC: 28 MG/DL — HIGH (ref 7–23)
CALCIUM SERPL-MCNC: 7.7 MG/DL — LOW (ref 8.4–10.5)
CALCIUM SERPL-MCNC: 8 MG/DL — LOW (ref 8.4–10.5)
CALCIUM SERPL-MCNC: 8.2 MG/DL — LOW (ref 8.4–10.5)
CALCIUM SERPL-MCNC: 8.3 MG/DL — LOW (ref 8.4–10.5)
CALCIUM SERPL-MCNC: 8.3 MG/DL — LOW (ref 8.4–10.5)
CHLORIDE SERPL-SCNC: 104 MMOL/L — SIGNIFICANT CHANGE UP (ref 96–108)
CHLORIDE SERPL-SCNC: 111 MMOL/L — HIGH (ref 96–108)
CHLORIDE SERPL-SCNC: 115 MMOL/L — HIGH (ref 96–108)
CHLORIDE SERPL-SCNC: 115 MMOL/L — HIGH (ref 96–108)
CHLORIDE SERPL-SCNC: 119 MMOL/L — HIGH (ref 96–108)
CO2 SERPL-SCNC: 16 MMOL/L — LOW (ref 22–31)
CO2 SERPL-SCNC: 21 MMOL/L — LOW (ref 22–31)
CO2 SERPL-SCNC: 21 MMOL/L — LOW (ref 22–31)
CO2 SERPL-SCNC: 22 MMOL/L — SIGNIFICANT CHANGE UP (ref 22–31)
CO2 SERPL-SCNC: 25 MMOL/L — SIGNIFICANT CHANGE UP (ref 22–31)
CREAT SERPL-MCNC: 0.66 MG/DL — SIGNIFICANT CHANGE UP (ref 0.5–1.3)
CREAT SERPL-MCNC: 0.67 MG/DL — SIGNIFICANT CHANGE UP (ref 0.5–1.3)
CREAT SERPL-MCNC: 0.7 MG/DL — SIGNIFICANT CHANGE UP (ref 0.5–1.3)
CREAT SERPL-MCNC: 0.7 MG/DL — SIGNIFICANT CHANGE UP (ref 0.5–1.3)
CREAT SERPL-MCNC: 0.76 MG/DL — SIGNIFICANT CHANGE UP (ref 0.5–1.3)
EGFR: 75 ML/MIN/1.73M2 — SIGNIFICANT CHANGE UP
EGFR: 83 ML/MIN/1.73M2 — SIGNIFICANT CHANGE UP
EGFR: 83 ML/MIN/1.73M2 — SIGNIFICANT CHANGE UP
EGFR: 84 ML/MIN/1.73M2 — SIGNIFICANT CHANGE UP
EGFR: 84 ML/MIN/1.73M2 — SIGNIFICANT CHANGE UP
GLUCOSE BLDC GLUCOMTR-MCNC: 127 MG/DL — HIGH (ref 70–99)
GLUCOSE SERPL-MCNC: 127 MG/DL — HIGH (ref 70–99)
GLUCOSE SERPL-MCNC: 150 MG/DL — HIGH (ref 70–99)
GLUCOSE SERPL-MCNC: 158 MG/DL — HIGH (ref 70–99)
GLUCOSE SERPL-MCNC: 159 MG/DL — HIGH (ref 70–99)
GLUCOSE SERPL-MCNC: 573 MG/DL — CRITICAL HIGH (ref 70–99)
HCT VFR BLD CALC: 32.6 % — LOW (ref 34.5–45)
HCT VFR BLD CALC: 34.8 % — SIGNIFICANT CHANGE UP (ref 34.5–45)
HCT VFR BLD CALC: 36.3 % — SIGNIFICANT CHANGE UP (ref 34.5–45)
HCT VFR BLD CALC: 37.5 % — SIGNIFICANT CHANGE UP (ref 34.5–45)
HGB BLD-MCNC: 10.8 G/DL — LOW (ref 11.5–15.5)
HGB BLD-MCNC: 11.6 G/DL — SIGNIFICANT CHANGE UP (ref 11.5–15.5)
HGB BLD-MCNC: 11.8 G/DL — SIGNIFICANT CHANGE UP (ref 11.5–15.5)
HGB BLD-MCNC: 12 G/DL — SIGNIFICANT CHANGE UP (ref 11.5–15.5)
MAGNESIUM SERPL-MCNC: 1.9 MG/DL — SIGNIFICANT CHANGE UP (ref 1.6–2.6)
MAGNESIUM SERPL-MCNC: 2.1 MG/DL — SIGNIFICANT CHANGE UP (ref 1.6–2.6)
MCHC RBC-ENTMCNC: 30.9 GM/DL — LOW (ref 32–36)
MCHC RBC-ENTMCNC: 32 PG — SIGNIFICANT CHANGE UP (ref 27–34)
MCHC RBC-ENTMCNC: 32.3 PG — SIGNIFICANT CHANGE UP (ref 27–34)
MCHC RBC-ENTMCNC: 32.4 PG — SIGNIFICANT CHANGE UP (ref 27–34)
MCHC RBC-ENTMCNC: 32.6 PG — SIGNIFICANT CHANGE UP (ref 27–34)
MCHC RBC-ENTMCNC: 33.1 GM/DL — SIGNIFICANT CHANGE UP (ref 32–36)
MCHC RBC-ENTMCNC: 33.1 GM/DL — SIGNIFICANT CHANGE UP (ref 32–36)
MCHC RBC-ENTMCNC: 33.9 GM/DL — SIGNIFICANT CHANGE UP (ref 32–36)
MCV RBC AUTO: 105.3 FL — HIGH (ref 80–100)
MCV RBC AUTO: 95.6 FL — SIGNIFICANT CHANGE UP (ref 80–100)
MCV RBC AUTO: 96.4 FL — SIGNIFICANT CHANGE UP (ref 80–100)
MCV RBC AUTO: 97.8 FL — SIGNIFICANT CHANGE UP (ref 80–100)
NRBC # BLD: 0 /100 WBCS — SIGNIFICANT CHANGE UP (ref 0–0)
OB PNL STL: POSITIVE
PHOSPHATE SERPL-MCNC: 2 MG/DL — LOW (ref 2.5–4.5)
PHOSPHATE SERPL-MCNC: 2.1 MG/DL — LOW (ref 2.5–4.5)
PLATELET # BLD AUTO: 148 K/UL — LOW (ref 150–400)
PLATELET # BLD AUTO: 170 K/UL — SIGNIFICANT CHANGE UP (ref 150–400)
PLATELET # BLD AUTO: 171 K/UL — SIGNIFICANT CHANGE UP (ref 150–400)
PLATELET # BLD AUTO: 177 K/UL — SIGNIFICANT CHANGE UP (ref 150–400)
POTASSIUM SERPL-MCNC: 3.3 MMOL/L — LOW (ref 3.5–5.3)
POTASSIUM SERPL-MCNC: 3.5 MMOL/L — SIGNIFICANT CHANGE UP (ref 3.5–5.3)
POTASSIUM SERPL-MCNC: 3.6 MMOL/L — SIGNIFICANT CHANGE UP (ref 3.5–5.3)
POTASSIUM SERPL-MCNC: 3.8 MMOL/L — SIGNIFICANT CHANGE UP (ref 3.5–5.3)
POTASSIUM SERPL-MCNC: 4.2 MMOL/L — SIGNIFICANT CHANGE UP (ref 3.5–5.3)
POTASSIUM SERPL-SCNC: 3.3 MMOL/L — LOW (ref 3.5–5.3)
POTASSIUM SERPL-SCNC: 3.5 MMOL/L — SIGNIFICANT CHANGE UP (ref 3.5–5.3)
POTASSIUM SERPL-SCNC: 3.6 MMOL/L — SIGNIFICANT CHANGE UP (ref 3.5–5.3)
POTASSIUM SERPL-SCNC: 3.8 MMOL/L — SIGNIFICANT CHANGE UP (ref 3.5–5.3)
POTASSIUM SERPL-SCNC: 4.2 MMOL/L — SIGNIFICANT CHANGE UP (ref 3.5–5.3)
PROT SERPL-MCNC: 5 G/DL — LOW (ref 6–8.3)
RBC # BLD: 3.38 M/UL — LOW (ref 3.8–5.2)
RBC # BLD: 3.56 M/UL — LOW (ref 3.8–5.2)
RBC # BLD: 3.64 M/UL — LOW (ref 3.8–5.2)
RBC # BLD: 3.71 M/UL — LOW (ref 3.8–5.2)
RBC # FLD: 16.6 % — HIGH (ref 10.3–14.5)
RBC # FLD: 16.6 % — HIGH (ref 10.3–14.5)
RBC # FLD: 16.8 % — HIGH (ref 10.3–14.5)
RBC # FLD: 17 % — HIGH (ref 10.3–14.5)
RH IG SCN BLD-IMP: POSITIVE — SIGNIFICANT CHANGE UP
SODIUM SERPL-SCNC: 137 MMOL/L — SIGNIFICANT CHANGE UP (ref 135–145)
SODIUM SERPL-SCNC: 142 MMOL/L — SIGNIFICANT CHANGE UP (ref 135–145)
SODIUM SERPL-SCNC: 142 MMOL/L — SIGNIFICANT CHANGE UP (ref 135–145)
SODIUM SERPL-SCNC: 145 MMOL/L — SIGNIFICANT CHANGE UP (ref 135–145)
SODIUM SERPL-SCNC: 150 MMOL/L — HIGH (ref 135–145)
WBC # BLD: 17.93 K/UL — HIGH (ref 3.8–10.5)
WBC # BLD: 19.82 K/UL — HIGH (ref 3.8–10.5)
WBC # BLD: 19.88 K/UL — HIGH (ref 3.8–10.5)
WBC # BLD: 20.21 K/UL — HIGH (ref 3.8–10.5)
WBC # FLD AUTO: 17.93 K/UL — HIGH (ref 3.8–10.5)
WBC # FLD AUTO: 19.82 K/UL — HIGH (ref 3.8–10.5)
WBC # FLD AUTO: 19.88 K/UL — HIGH (ref 3.8–10.5)
WBC # FLD AUTO: 20.21 K/UL — HIGH (ref 3.8–10.5)

## 2024-01-24 PROCEDURE — 99222 1ST HOSP IP/OBS MODERATE 55: CPT | Mod: 25

## 2024-01-24 PROCEDURE — 99232 SBSQ HOSP IP/OBS MODERATE 35: CPT

## 2024-01-24 PROCEDURE — 99233 SBSQ HOSP IP/OBS HIGH 50: CPT | Mod: GC

## 2024-01-24 PROCEDURE — 31575 DIAGNOSTIC LARYNGOSCOPY: CPT

## 2024-01-24 RX ORDER — HEPARIN SODIUM 5000 [USP'U]/ML
1100 INJECTION INTRAVENOUS; SUBCUTANEOUS
Qty: 25000 | Refills: 0 | Status: DISCONTINUED | OUTPATIENT
Start: 2024-01-24 | End: 2024-01-24

## 2024-01-24 RX ORDER — POTASSIUM CHLORIDE 20 MEQ
40 PACKET (EA) ORAL ONCE
Refills: 0 | Status: COMPLETED | OUTPATIENT
Start: 2024-01-24 | End: 2024-01-24

## 2024-01-24 RX ORDER — PANTOPRAZOLE SODIUM 20 MG/1
40 TABLET, DELAYED RELEASE ORAL EVERY 12 HOURS
Refills: 0 | Status: DISCONTINUED | OUTPATIENT
Start: 2024-01-24 | End: 2024-01-25

## 2024-01-24 RX ORDER — METOPROLOL TARTRATE 50 MG
12.5 TABLET ORAL ONCE
Refills: 0 | Status: COMPLETED | OUTPATIENT
Start: 2024-01-24 | End: 2024-01-24

## 2024-01-24 RX ORDER — CARVEDILOL PHOSPHATE 80 MG/1
6.25 CAPSULE, EXTENDED RELEASE ORAL EVERY 12 HOURS
Refills: 0 | Status: DISCONTINUED | OUTPATIENT
Start: 2024-01-24 | End: 2024-01-24

## 2024-01-24 RX ORDER — SODIUM,POTASSIUM PHOSPHATES 278-250MG
1 POWDER IN PACKET (EA) ORAL ONCE
Refills: 0 | Status: COMPLETED | OUTPATIENT
Start: 2024-01-24 | End: 2024-01-24

## 2024-01-24 RX ORDER — SODIUM CHLORIDE 9 MG/ML
1000 INJECTION, SOLUTION INTRAVENOUS
Refills: 0 | Status: DISCONTINUED | OUTPATIENT
Start: 2024-01-24 | End: 2024-01-24

## 2024-01-24 RX ORDER — METOPROLOL TARTRATE 50 MG
25 TABLET ORAL EVERY 12 HOURS
Refills: 0 | Status: DISCONTINUED | OUTPATIENT
Start: 2024-01-24 | End: 2024-01-24

## 2024-01-24 RX ORDER — METOPROLOL TARTRATE 50 MG
25 TABLET ORAL EVERY 12 HOURS
Refills: 0 | Status: DISCONTINUED | OUTPATIENT
Start: 2024-01-24 | End: 2024-01-25

## 2024-01-24 RX ADMIN — SODIUM CHLORIDE 100 MILLILITER(S): 9 INJECTION, SOLUTION INTRAVENOUS at 09:33

## 2024-01-24 RX ADMIN — Medication 25 MILLIGRAM(S): at 18:31

## 2024-01-24 RX ADMIN — Medication 1 PACKET(S): at 06:59

## 2024-01-24 RX ADMIN — PIPERACILLIN AND TAZOBACTAM 25 GRAM(S): 4; .5 INJECTION, POWDER, LYOPHILIZED, FOR SOLUTION INTRAVENOUS at 21:49

## 2024-01-24 RX ADMIN — LATANOPROST 1 DROP(S): 0.05 SOLUTION/ DROPS OPHTHALMIC; TOPICAL at 23:20

## 2024-01-24 RX ADMIN — Medication 40 MILLIEQUIVALENT(S): at 07:07

## 2024-01-24 RX ADMIN — Medication 88 MICROGRAM(S): at 06:00

## 2024-01-24 RX ADMIN — PANTOPRAZOLE SODIUM 40 MILLIGRAM(S): 20 TABLET, DELAYED RELEASE ORAL at 12:19

## 2024-01-24 RX ADMIN — SODIUM CHLORIDE 100 MILLILITER(S): 9 INJECTION, SOLUTION INTRAVENOUS at 12:35

## 2024-01-24 RX ADMIN — PIPERACILLIN AND TAZOBACTAM 25 GRAM(S): 4; .5 INJECTION, POWDER, LYOPHILIZED, FOR SOLUTION INTRAVENOUS at 15:27

## 2024-01-24 RX ADMIN — Medication 12.5 MILLIGRAM(S): at 06:33

## 2024-01-24 RX ADMIN — Medication 12.5 MILLIGRAM(S): at 12:33

## 2024-01-24 RX ADMIN — PIPERACILLIN AND TAZOBACTAM 25 GRAM(S): 4; .5 INJECTION, POWDER, LYOPHILIZED, FOR SOLUTION INTRAVENOUS at 06:32

## 2024-01-24 NOTE — CONSULT NOTE ADULT - SUBJECTIVE AND OBJECTIVE BOX
HPI: 87YO F with PMHx of Glaucoma (Latanoprost), Hypothyroidism, HTN presents from [facility] due to being found conscious on the ground in her apartment. Per [facility] ED note, patient's building superintendant was concerned when patient did not  newspaper for 2 days and found patient lying awake on the floor and weak and thereafter called EMS. Patient lives alone, with no HHA, and uses cane to walk when outside but no cane/walker inside her home. Per patient, she does not recall what happened (lost consciousness), previously felt confused, but vehemently denies a fall. Her last recollection involves visiting her niece, Alba, in [place] and sitting in her living room to read. She denies any prior history of falls. She denies loss of consciousness, and remained on the floor for ~2 days.   She drinks approximately 1 oz glass of vodka once weekly, with her last drink on Friday.       Of note, upon arrival to [facility], extensive bruising/echymoses were apparent of various stages of healing dispersed throughout her buttocks, back, arms, and legs. When inquiring regarding her feeling safe, she reports yes and denies any concerns or fears of harm to her by others. She reports not having much support, as her brother passed away last year and most of her loved ones have passed away.     Otherwise, she denies any chest pain, abdominal pain, N/V/D, fever, fatigue, chills, or other pertinent Sx on ROS.     SLP saw the patient yesterday and noted one of the TVF could be dysfunctioning. FEES from yesterday showing muscle strain, but no evidence of vocal fold dysfunction. ENT consulted to further evaluate hoarseness and possible vocal fold dysfunction. Pt denies any difficulty breathing, eating, drinking. Pt notes voice is hoarse since 3 days ago but reports it is significantly improved since yesterday. Pt seen drinking water and tolerating. Pt voice is mildly hoarse, not breathy, no stridor noted.    Allergies    Flagyl (Other)    Intolerances    Augmentin (Diarrhea)  NSAIDs (Other)  dairy products (Diarrhea)  Cipro (Diarrhea)      PAST MEDICAL & SURGICAL HISTORY:  Hypothyroidism      HTN (hypertension)      Glaucoma      History of cataract surgery, right          SOCIAL HISTORY:  Tobacco History: quit 30 yrs ago  ETOH Use: 1oz vodka/week        REVIEW OF SYSTEMS: normal aside from those mentioned in HPI    MEDICATIONS:  Antiinfectives:   piperacillin/tazobactam IVPB.. 4.5 Gram(s) IV Intermittent every 8 hours      Hematologic/Anticoagulation:      Pain medications/Neuro:  acetaminophen     Tablet .. 650 milliGRAM(s) Oral every 6 hours PRN  LORazepam   Injectable 0.5 milliGRAM(s) IV Push once PRN  melatonin 3 milliGRAM(s) Oral at bedtime PRN  ondansetron Injectable 4 milliGRAM(s) IV Push every 8 hours PRN      IV fluids:  dextrose 5%. 1000 milliLiter(s) IV Continuous <Continuous>      Endocrine Medications:   levothyroxine 88 MICROGram(s) Oral daily      All other standing medications:   latanoprost 0.005% Ophthalmic Solution 1 Drop(s) Both EYES at bedtime  metoprolol tartrate 25 milliGRAM(s) Oral every 12 hours  pantoprazole  Injectable 40 milliGRAM(s) IV Push every 12 hours      All other PRN medications:  aluminum hydroxide/magnesium hydroxide/simethicone Suspension 30 milliLiter(s) Oral every 4 hours PRN      Vital Signs Last 24 Hrs  T(C): 36.4 (24 Jan 2024 10:14), Max: 36.7 (23 Jan 2024 22:50)  T(F): 97.5 (24 Jan 2024 10:14), Max: 98 (23 Jan 2024 22:50)  HR: 84 (24 Jan 2024 12:28) (80 - 94)  BP: 148/69 (24 Jan 2024 12:28) (137/90 - 168/79)  BP(mean): 99 (24 Jan 2024 12:28) (99 - 118)  RR: 17 (24 Jan 2024 12:28) (17 - 19)  SpO2: 98% (24 Jan 2024 12:28) (95% - 99%)    Parameters below as of 24 Jan 2024 12:28  Patient On (Oxygen Delivery Method): room air        LABS:  CBC-    01-24    145  |  115<H>  |  25<H>  ----------------------------<  158<H>  3.8   |  21<L>  |  0.70    Ca    8.2<L>      24 Jan 2024 12:18  Phos  2.1     01-24  Mg     2.1     01-24    TPro  5.0<L>  /  Alb  2.7<L>  /  TBili  0.8  /  DBili  x   /  AST  93<H>  /  ALT  42  /  AlkPhos  88  01-24    Coagulation Studies-  PT/INR - ( 23 Jan 2024 03:44 )   PT: 14.2 sec;   INR: 1.25          PTT - ( 23 Jan 2024 03:44 )  PTT:23.5 sec  Endocrine Panel-  --  --  8.2 mg/dL  --  --  8.0 mg/dL  --  --  8.3 mg/dL  --  --  7.7 mg/dL  --  --  8.9 mg/dL  --  --  8.8 mg/dL  1.020 ng/dL  --  8.4 mg/dL  --  --  8.4 mg/dL  8.727 uIU/mL  --  --  --  --  9.0 mg/dL        PHYSICAL EXAM:    ENT EXAM-   Constitutional: Patient appears her stated age.  Mild hoarseness noted.  Head:  normocephalic, atraumatic.   Nose:  Septum intact, midline.  Inferior turbinates normal bilateral  OC/OP:  Tonsils not visualized. White spot noted on anterior R tongue. Floor of mouth, buccal mucosa, lips, hard palate, soft palate, uvula, posterior pharyngeal wall normal.  Mucosa moist.  Neck:  Trachea midline.  Thyroid, parotid and submandibular glands normal.  Lymph:  No cervical adenopathy.    MULTISYSTEM EXAM-  Neuro/Psych:  A&O x 3.    Cranial nerves: 2-12 grossly intact bilaterally.  Eyes:  EOMI, no nystagmus.  Pulm:  No dyspnea, non-labored breathing  Cardiovascular: No periphreal edema.  Skin:  No rash or lesions on exposed skin of head/neck    Laryngoscopy Findings:     PROCEDURE NOTE:PROCEDURE NOTE:    Procedure: Flexible laryngoscopy (CPT 27758)     Pre-Procedure Diagnosis: hoarseness    Post-Procedure Diagnosis: hoarseness      Indications for Procedure: ROSE HINES is a88y  Female who presents with hoarseness. See above full HPI for further details. A detailed assessment of the nasal cavity, nasopharynx, hypopharynx, oropharynx, and larynx was required. An indirect mirror examination was not sufficient for complete evaluation due to patient discomfort or incomplete view. Therefore flexible laryngoscopy was performed.       Description of Procedure: After obtaining verbal consent, a flexible fiberoptic laryngoscope was inserted into the right nasal cavity. The nasal anatomy was examined for evidence of  nasal cavity obstruction. The septum was examined for clinically significant deviation.  Passing the flexible scope into the oropharynx and hypopharynx allowed examination of the base of tongue and vallecula and epiglottis. The piriform sinuses were examined for lesions and pooling of secretions or visible aspiration. The false vocal cords and true vocal cords were examined. The true vocal cords were examined for mobility, symmetry and closure. The visualized portion of the subglottis examined. The pharynx was examined for  visible extrinsic compression. The patient tolerated the procedure well without complications.      Findings:  nasopharynx wnl  BOT and vallecula wnl  BL arytenoids mobile  Complete closure of BL vocal folds, no glottic gap noted  BL false folds are hypermobile, improved from yesterday's FEES  Pyriform sinuses and post cricoid space without masses or lesions  Trachea visualized with no obstruction noted at this time       HPI: 89YO F with PMHx of Glaucoma (Latanoprost), Hypothyroidism, HTN presents from [facility] due to being found conscious on the ground in her apartment. Per [facility] ED note, patient's building superintendant was concerned when patient did not  newspaper for 2 days and found patient lying awake on the floor and weak and thereafter called EMS. Patient lives alone, with no HHA, and uses cane to walk when outside but no cane/walker inside her home. Per patient, she does not recall what happened (lost consciousness), previously felt confused, but vehemently denies a fall. Her last recollection involves visiting her niece, Alba, in [place] and sitting in her living room to read. She denies any prior history of falls. She denies loss of consciousness, and remained on the floor for ~2 days.   She drinks approximately 1 oz glass of vodka once weekly, with her last drink on Friday.       Of note, upon arrival to [facility], extensive bruising/echymoses were apparent of various stages of healing dispersed throughout her buttocks, back, arms, and legs. When inquiring regarding her feeling safe, she reports yes and denies any concerns or fears of harm to her by others. She reports not having much support, as her brother passed away last year and most of her loved ones have passed away.     Otherwise, she denies any chest pain, abdominal pain, N/V/D, fever, fatigue, chills, or other pertinent Sx on ROS.     SLP saw the patient yesterday and noted one of the TVF could be dysfunctioning. FEES from yesterday showing muscle strain, but no evidence of vocal fold dysfunction. ENT consulted to further evaluate hoarseness and possible vocal fold dysfunction. Pt denies any difficulty breathing, eating, drinking. Pt notes voice is hoarse since 3 days ago but reports it is significantly improved since yesterday. Pt seen drinking water and tolerating. Pt voice is mildly hoarse, not breathy, no stridor noted.    Allergies    Flagyl (Other)    Intolerances    Augmentin (Diarrhea)  NSAIDs (Other)  dairy products (Diarrhea)  Cipro (Diarrhea)      PAST MEDICAL & SURGICAL HISTORY:  Hypothyroidism      HTN (hypertension)      Glaucoma      History of cataract surgery, right          SOCIAL HISTORY:  Tobacco History: quit 30 yrs ago  ETOH Use: 1oz vodka/week        REVIEW OF SYSTEMS: normal aside from those mentioned in HPI    MEDICATIONS:  Antiinfectives:   piperacillin/tazobactam IVPB.. 4.5 Gram(s) IV Intermittent every 8 hours      Hematologic/Anticoagulation:      Pain medications/Neuro:  acetaminophen     Tablet .. 650 milliGRAM(s) Oral every 6 hours PRN  LORazepam   Injectable 0.5 milliGRAM(s) IV Push once PRN  melatonin 3 milliGRAM(s) Oral at bedtime PRN  ondansetron Injectable 4 milliGRAM(s) IV Push every 8 hours PRN      IV fluids:  dextrose 5%. 1000 milliLiter(s) IV Continuous <Continuous>      Endocrine Medications:   levothyroxine 88 MICROGram(s) Oral daily      All other standing medications:   latanoprost 0.005% Ophthalmic Solution 1 Drop(s) Both EYES at bedtime  metoprolol tartrate 25 milliGRAM(s) Oral every 12 hours  pantoprazole  Injectable 40 milliGRAM(s) IV Push every 12 hours      All other PRN medications:  aluminum hydroxide/magnesium hydroxide/simethicone Suspension 30 milliLiter(s) Oral every 4 hours PRN      Vital Signs Last 24 Hrs  T(C): 36.4 (24 Jan 2024 10:14), Max: 36.7 (23 Jan 2024 22:50)  T(F): 97.5 (24 Jan 2024 10:14), Max: 98 (23 Jan 2024 22:50)  HR: 84 (24 Jan 2024 12:28) (80 - 94)  BP: 148/69 (24 Jan 2024 12:28) (137/90 - 168/79)  BP(mean): 99 (24 Jan 2024 12:28) (99 - 118)  RR: 17 (24 Jan 2024 12:28) (17 - 19)  SpO2: 98% (24 Jan 2024 12:28) (95% - 99%)    Parameters below as of 24 Jan 2024 12:28  Patient On (Oxygen Delivery Method): room air        LABS:  CBC-    01-24    145  |  115<H>  |  25<H>  ----------------------------<  158<H>  3.8   |  21<L>  |  0.70    Ca    8.2<L>      24 Jan 2024 12:18  Phos  2.1     01-24  Mg     2.1     01-24    TPro  5.0<L>  /  Alb  2.7<L>  /  TBili  0.8  /  DBili  x   /  AST  93<H>  /  ALT  42  /  AlkPhos  88  01-24    Coagulation Studies-  PT/INR - ( 23 Jan 2024 03:44 )   PT: 14.2 sec;   INR: 1.25          PTT - ( 23 Jan 2024 03:44 )  PTT:23.5 sec  Endocrine Panel-  --  --  8.2 mg/dL  --  --  8.0 mg/dL  --  --  8.3 mg/dL  --  --  7.7 mg/dL  --  --  8.9 mg/dL  --  --  8.8 mg/dL  1.020 ng/dL  --  8.4 mg/dL  --  --  8.4 mg/dL  8.727 uIU/mL  --  --  --  --  9.0 mg/dL        PHYSICAL EXAM:    ENT EXAM-   Constitutional: Patient appears her stated age.  Mild hoarseness noted.  Head:  normocephalic, atraumatic.   Nose:  Septum intact, midline.  Inferior turbinates normal bilateral  OC/OP:  Tonsils not visualized. White spot noted on anterior R tongue. Floor of mouth, buccal mucosa, lips, hard palate, soft palate, uvula, posterior pharyngeal wall normal.  Mucosa moist.  Neck:  Trachea midline.  Thyroid, parotid and submandibular glands normal.  Lymph:  No cervical adenopathy.    MULTISYSTEM EXAM-  Neuro/Psych:  A&O x 3.    Cranial nerves: 2-12 grossly intact bilaterally.  Eyes:  EOMI, no nystagmus.  Pulm:  No dyspnea, non-labored breathing  Cardiovascular: No periphreal edema.  Skin:  No rash or lesions on exposed skin of head/neck    Laryngoscopy Findings:     PROCEDURE NOTE:PROCEDURE NOTE:    Procedure: Flexible laryngoscopy (CPT 36228)     Pre-Procedure Diagnosis: hoarseness    Post-Procedure Diagnosis: hoarseness, supraglottic compression      Indications for Procedure: ROSE HINES is a88y  Female who presents with hoarseness. See above full HPI for further details. A detailed assessment of the nasal cavity, nasopharynx, hypopharynx, oropharynx, and larynx was required. An indirect mirror examination was not sufficient for complete evaluation due to patient discomfort or incomplete view. Therefore flexible laryngoscopy was performed.       Description of Procedure: After obtaining verbal consent, a flexible fiberoptic laryngoscope was inserted into the right nasal cavity. The nasal anatomy was examined for evidence of  nasal cavity obstruction. The septum was examined for clinically significant deviation.  Passing the flexible scope into the oropharynx and hypopharynx allowed examination of the base of tongue and vallecula and epiglottis. The piriform sinuses were examined for lesions and pooling of secretions or visible aspiration. The false vocal cords and true vocal cords were examined. The true vocal cords were examined for mobility, symmetry and closure. The visualized portion of the subglottis examined. The pharynx was examined for  visible extrinsic compression. The patient tolerated the procedure well without complications.      Findings:  nasopharynx wnl  BOT and vallecula wnl  BL arytenoids mobile  Complete closure of BL vocal folds, no glottic gap noted. +supraglottic compression  BL false folds are hypermobile, improved from yesterday's FEES  Pyriform sinuses and post cricoid space without masses or lesions  Trachea visualized with no obstruction noted at this time

## 2024-01-24 NOTE — CHART NOTE - NSCHARTNOTEFT_GEN_A_CORE
Spoke to patient regarding her GOC. She wishes to be DNR/DNI stating that "she is old and it doesn't make sense." Her two nieces, Vianey [place] and Alba Boss were present for the conversation and supported patient throughout conversation. NATALIE signed and will be placed in chart.     Her HCP is Theresa Pluth Yeo and her alternate agents are her nieces stated above (Vianey [place] and Alba Boss). She has paperwork filled out. Copies made and will scan into chart.

## 2024-01-24 NOTE — PROGRESS NOTE ADULT - PROBLEM SELECTOR PLAN 9
TSH elevated at 8.727. FT4 WNL. Subclinical.  - continue home Levothyroxine 88mcg PO    #Glaucoma  - continue home med Latanoprost 0.005%    #HTN  - holding Home med Ramipril 10 i/s/o hypovolemia. Presented with extensive ecchymosis with various stages of healing on buttocks, back, LE and UE dorsal and ventral surfaces. Stage I Pressure ulcers on L and R buttocks and mid-thoracic region of trunk.   Reports minimal to no support at home.  CTAP noncon 1/22: no evidence of hematoma / fractures.   Most in the setting of falls. No AC medication.  - JA

## 2024-01-24 NOTE — CONSULT NOTE ADULT - ASSESSMENT
{\rtf1\bvbqrp80786\ansi\rdbewyr5134\ftnbj\uc1\deff0  {\fonttbl{\f0 \fnil Segoe UI;}{\f1 \fnil \fcharset0 Segoe UI;}{\f2 \fnil Times New Eddie;}}  {\colortbl ;\sih244\rienp038\yztc256 ;\red0\green0\blue0 ;\red0\green0\qyjr518 ;\red0\green0\blue0 ;}  {\stylesheet{\f0\fs20 Normal;}{\cs1 Default Paragraph Font;}{\cs2\f0\fs16 Line Number;}{\cs3\f2\fs24\ul\cf3 Hyperlink;}}  {\*\revtbl{Unknown;}}  \tsvxzx42781\zilfln02208\peurm0118\wxhov5229\sbyak0072\oiisd2356\fnzwisz508\bszqzhr948\nogrowautofit\xkembq349\formshade\nofeaturethrottle1\dntblnsbdb\fet4\aendnotes\aftnnrlc\pgbrdrhead\pgbrdrfoot  \sectd\uidnqh64399\nltgjv58336\guttersxn0\dgaelajm8921\yeiozjws5536\cmvzrgdu6661\hjrumhkh6451\ycsgkzg729\afrvekl668\sbkpage\pgncont\pgndec  \plain\plain\f0\fs24\ql\plain\f0\fs24\plain\f0\fs20\zjnv0988\hich\f0\dbch\f0\loch\f0\fs20\par  I M\par  \par  88 y o F with PMHx of Glaucoma (Latanoprost), Hypothyroidism, HTN presents from [facility] due to being found conscious on the ground in her apartment, found to be in Atrial fibrillation with RVR and meeting SIRS criteria, now admitted to telemetry for work-up   of syncope, altered mental status, and further monitoring.\par  \par  \plain\f1\fs20\laet8621\hich\f1\dbch\f1\loch\f1\cf2\fs20\ul{\field{\*\fldinst HYPERLINK [id number] }{\fldrslt Problem/Plan - 1:}}\plain\f0\fs20\wust2496\hich\f0\dbch\f0\loch\f0\fs20\ql\par  \'b7  {\*\bkmkstart zo70773871929}{\*\bkmkend ha15142752095}Problem: {\*\bkmkstart pj68499738446}{\*\bkmkend kx13882143723}Toxic metabolic encephalopathy. \par  \'b7  {\*\bkmkstart ns49134123065}{\*\bkmkend lo34000606689}Plan: {\*\bkmkstart ox91163223951}{\*\bkmkend yh63724346822}#Dehydration\par  #Hypernatremia\par  \par  DDx includes: Toxins (in the setting of alcohol use) vs. infectious etiology (meets severe sepsis criteria as below) vs. Uremic (BUN 51) vs. Metabolic (given hypothyroidism, electrolyte derangements and elevated TSH 8.727)\par  CT Head negative for intracranial acute pathology, therefore low suspicion for stroke and subarachnoid hemorrhage as etiology.\par  Less likely anticholinergic toxidrome given no medications that would suggest this and no decreased bowel sounds, hyperthermia, flushing of skin although pt is somewhat agitated.\par  Less likely adrenal crisis, given no hypoglycemia as above, No weakness (Muscle strength 4/5 B/L LE on exam), severe fatigue, unintentional weight loss, nausea, vomiting, abdominal pain, reduced appetite, back or limb pain although had initial loss of   consciousness. Although some metabolic derangements seen as above.\par  Pt does not recall very much prior to or after the incident leading up to being in [facility] ED. \par  No tongue fasciculations present on ED or reported urinary incontinence or known seizure hx that may suggest Seizure as etiology of presentation.\par  CTPE negative. CT Hip reveals Degenerative changes of the lumbar spine and pelvis with no fractures. CT C/A/P No Contrast No definite evidence of solid visceral injury in the chest, abdomen or pelvis within the limits of this noncontrast study. CT Head   and Cervical spine No Cont negative.\par  \par  -f/u CT Head \par  -f/u Urine Drug Screen\par  -f/u Ammonia level\par  -do not give opioids for pain regimen\par  -IV Fluid Bolus (2L) then bolus as needed.\par  -Speech&Swallow Evaluation\par  -PT Evaluation.\plain\f1\fs20\cxsk6216\hich\f1\dbch\f1\loch\f1\cf2\fs20\strike\plain\f0\fs20\ifzd3348\hich\f0\dbch\f0\loch\f0\fs20\par  \par  \plain\f1\fs20\vkit6894\hich\f1\dbch\f1\loch\f1\cf2\fs20\ul{\field{\*\fldinst HYPERLINK [id number] }{\fldrslt Problem/Plan - 2:}}\plain\f0\fs20\mdzy2193\hich\f0\dbch\f0\loch\f0\fs20\ql\par  \'b7  {\*\bkmkstart hk83219972023}{\*\bkmkend bm07403301558}Problem: {\*\bkmkstart ix83133975144}{\*\bkmkend xh43827712735}Syncope. \par  \'b7  {\*\bkmkstart oe11355211984}{\*\bkmkend tf81921709545}Plan: {\*\bkmkstart um03373561677}{\*\bkmkend lm42535697433}Etiology likely orthostatic (in the setting of hypovolemia vs. general deconditioning vs. position change) vs.  cardiogenic (in the   setting of atrial fibrillation seen in ED). No diuretic use that may contribute to hypovolemia. \par  Less likely Neurocardiogenic given no clear trigger such as occuring with cough, defacation, micturition, emotional stress; No clear prodromes were present such as diaphoresis, nausea, blurry vision. No association with migraines/hx c/w SAH "worst headache   of life"\par  No known history of CKD, Amylodosis, POTS. Per pt, episode did not occur with positional changes/ denies lightheadedness with sitting to standing.\par  Less likely other cardiovascular etiologies including mechanical/valvular/PE, not caused by exertion.\par  No tongue-biting or prior hx to suggest seizure (although per ED note, patient was found having urinated on himself). No hypoglycemia or hypoxia.\par  \par  -f/u Orthostatic vitals\par  -Telemetry monitoring\par  -Repeat EKG\par  -maintenance fluids.\par  \par  \plain\f1\fs20\pflq1618\hich\f1\dbch\f1\loch\f1\cf2\fs20\ul{\field{\*\fldinst HYPERLINK [id number] }{\fldrslt Problem/Plan - 3:}}\plain\f0\fs20\fvxy4369\hich\f0\dbch\f0\loch\f0\fs20\ql\par  \'b7  {\*\bkmkstart aa25938439795}{\*\bkmkend fa02756956767}Problem: {\*\bkmkstart fs27934038629}{\*\bkmkend hv68046615284}Severe sepsis. \par  \'b7  {\*\bkmkstart ex91003869243}{\*\bkmkend bw01846049165}Plan: {\*\bkmkstart fm46730258381}{\*\bkmkend zk60362683535}Patient meeting 3/4 SIRS criteria (HR>90, WBC >12, lactate >2) w/o clear source. \par  No respiratory (cough, SOB), abdominal, or urinary complaints upon admission. No fever, fatigue, chills.\par  COVID/Influenza/RSV negative.\par  CTPE unremarkable.\par  -CTX 1g QD x5 days for empiric cystitic tx (1/23-1/27)\par  -f/u UA with Reflex Culture\par  -f/u Procalcitonin\par  -Trend lactate to clear.\par  -Avoid anti-cholinergics (can exacerbate urinary retention)\par  - Monitor WBC\par  - Monitor for fevers\par  - Monitor VS\par  - f/u BCx.\par  \par  \plain\f1\fs20\desd0362\hich\f1\dbch\f1\loch\f1\cf2\fs20\ul{\field{\*\fldinst HYPERLINK [id number] }{\fldrslt Problem/Plan - 4:}}\plain\f0\fs20\zvui7473\hich\f0\dbch\f0\loch\f0\fs20\ql\par  \'b7  {\*\bkmkstart hq40086406651}{\*\bkmkend mg77939181627}Problem: {\*\bkmkstart rs58565216700}{\*\bkmkend hx25718270176}Transaminitis. \par  \'b7  {\*\bkmkstart sn14538692848}{\*\bkmkend vt07974001408}Plan: {\*\bkmkstart vh67567250678}{\*\bkmkend lo00873967916}AST>ALT 2:1 ratio 2/2 alcohol use\par  216/56, Alkaline phosphatase 129 (high) \par  Pt reports drinking 1 oz vodka 1x/week\par  \par  -Trend LFTs\par  -f/u Acetaminophen level\par  -f/u Salicyclate level.\par  \par  \plain\f1\fs20\snuz8869\hich\f1\dbch\f1\loch\f1\cf2\fs20\ul{\field{\*\fldinst HYPERLINK [id number] }{\fldrslt Problem/Plan - 5:}}\plain\f0\fs20\uxjl6271\hich\f0\dbch\f0\loch\f0\fs20\ql\par  \'b7  {\*\bkmkstart zx89292854131}{\*\bkmkend hi43272337959}Problem: {\*\bkmkstart mq01309818498}{\*\bkmkend sj50676942168}Elevated troponin. \par  \'b7  {\*\bkmkstart no39833211821}{\*\bkmkend wo94483937274}Plan: {\*\bkmkstart jy13678385161}{\*\bkmkend cb08008915395}Likely 2/2 Demand ischemia in the setting of hypoperfusion/sepsis\par  Troponins elevated although downtrending 212.6>190.5>188\par  EKG non-ischemic changes.\par  \par  -Trend troponin.\par  \par  \plain\f1\fs20\cvdo6251\hich\f1\dbch\f1\loch\f1\cf2\fs20\ul{\field{\*\fldinst HYPERLINK [id number] }{\fldrslt Problem/Plan - 6:}}\plain\f0\fs20\tubp5147\hich\f0\dbch\f0\loch\f0\fs20\ql\par  \'b7  {\*\bkmkstart kl52591565405}{\*\bkmkend wc79145841904}Problem: {\*\bkmkstart sr09348629705}{\*\bkmkend qq39892833505}Hypothyroidism. \par  \'b7  {\*\bkmkstart vx87714102171}{\*\bkmkend ng64417127471}Plan: {\*\bkmkstart zw21725129459}{\*\bkmkend hb53756713902}Home meds: Levothyroxine 88mcg QD\par  TSH elevated at 8.727\par  \par  -c/w IV 80% home PO dose (70mcg QD)\par  -Pending Speech&Swallow eval, switch to PO Levothyroxin 88mcg QD\par  -f/u BMP.\par  \par  \plain\f1\fs20\gjzh9640\hich\f1\dbch\f1\loch\f1\cf2\fs20\ul{\field{\*\fldinst HYPERLINK [id number] }{\fldrslt Problem/Plan - 7:}}\plain\f0\fs20\nijm7603\hich\f0\dbch\f0\loch\f0\fs20\ql\par  \'b7  {\*\bkmkstart gw68054534209}{\*\bkmkend ok73918491342}Problem: {\*\bkmkstart vq26065943371}{\*\bkmkend ol17142925464}Rapid atrial fibrillation. \par  \'b7  {\*\bkmkstart qf84371741397}{\*\bkmkend wl33915781012}Plan: {\*\bkmkstart cn65997986251}{\*\bkmkend be00043110999}Upon arrival to [facility], EKG reads Sinus Tachycardia with PVCs ()\par  No prior history of Atrial fibrillation\par  \par  -Metoprolol 2.5mg Q6 PRN\par  -f/u TTE.\par  \par  \plain\f1\fs20\tilc0095\hich\f1\dbch\f1\loch\f1\cf2\fs20\ul{\field{\*\fldinst HYPERLINK [id number] }{\fldrslt Problem/Plan - 8:}}\plain\f0\fs20\okej8995\hich\f0\dbch\f0\loch\f0\fs20\ql\par  \'b7  {\*\bkmkstart vg78857643166}{\*\bkmkend br88299119852}Problem: {\*\bkmkstart le37122921465}{\*\bkmkend dr69481120485}Ecchymosis on examination. \par  \'b7  {\*\bkmkstart js55417286659}{\*\bkmkend vd02437131865}Plan: {\*\bkmkstart zr34918695578}{\*\bkmkend pl79067552734}Possibly in the setting of multiple falls.\par  Pt is not on any anti-coagulants in home medications.\par  Extensive echymosis with various stages of healing on buttocks, back, LE and UE dorsal and ventral surfaces. Stage I Pressure ulcers on L and R buttocks and mid-thoracic region of trunk. \par  When inquiring regarding her feeling safe, she reports yes and denies any concerns or fears of harm to her by others. She reports not having much support, as her brother passed away last year and most of her loved ones have passed away. \par  \par  -f/u platelets\par  -f/u PT/PTT/INR.\par  \par  \plain\f1\fs20\ywer1481\hich\f1\dbch\f1\loch\f1\cf2\fs20\ul{\field{\*\fldinst HYPERLINK [id number] }{\fldrslt Problem/Plan - 9:}}\plain\f0\fs20\qmva6639\hich\f0\dbch\f0\loch\f0\fs20\ql\par  \'b7  {\*\bkmkstart tt36039546040}{\*\bkmkend tk09449913909}Problem: {\*\bkmkstart zq40603247583}{\*\bkmkend qm59500867517}Elevated creatine kinase. \par  \'b7  {\*\bkmkstart ac84874148289}{\*\bkmkend tb57746453889}Plan: {\*\bkmkstart cx58941265787}{\*\bkmkend kd03046867328}2/2 Fall.\par  CK elevated 4434>5183\par  \par  -f/u CK in AM\par  -PT Eval\par  \par  \par  #HTN\par  Home medication: Ramapril (unknown dose to pt)\par  \par  -hold home med for now given NPO status.\par  \par  \plain\f1\fs20\ukyg3604\hich\f1\dbch\f1\loch\f1\cf2\fs20\ul{\field{\*\fldinst HYPERLINK [id number] }{\fldrslt Problem/Plan - 10:}}\plain\f0\fs20\wvwq4621\hich\f0\dbch\f0\loch\f0\fs20\ql\par  \'b7  {\*\bkmkstart mq53348298596}{\*\bkmkend lj20367328812}Problem: {\*\bkmkstart gx27712206881}{\*\bkmkend yl29056315092}Glaucoma. \par  \'b7  {\*\bkmkstart cc66832772378}{\*\bkmkend nw55510524914}Plan; {\*\bkmkstart xu18196010757}{\*\bkmkend ad94554677158}Home medication: Latanoprost eyedrops \par  \par  -c/w home med Latanoprost.\par  \par  \plain\f1\fs20\zooy4017\hich\f1\dbch\f1\loch\f1\cf2\fs20\ul{\field{\*\fldinst HYPERLINK [id number] }{\fldrslt Problem/Plan - 11:}}\plain\f0\fs20\zeuh0679\hich\f0\dbch\f0\loch\f0\fs20\ql\par  \'b7  {\*\bkmkstart lj171664356641}{\*\bkmkend ft475980302411}Problem: {\*\bkmkstart uf567706681924}{\*\bkmkend lr805833256903}High anion gap metabolic acidosis. \par  \'b7  {\*\bkmkstart ad691209538716}{\*\bkmkend wp221793766348}Plan: {\*\bkmkstart cj597232726742}{\*\bkmkend fm547253975684}Likely in the setting of NS administration vs. lactic acidosis (Lactate 3.7)\par  AG 19 (high), HCO3 15 (low)\par  \par  -CTM.\par  \par  \plain\f1\fs20\hycx3011\hich\f1\dbch\f1\loch\f1\cf2\fs20\ul{\field{\*\fldinst HYPERLINK [id number] }{\fldrslt Problem/Plan - 12:}}\plain\f0\fs20\pmmp6581\hich\f0\dbch\f0\loch\f0\fs20\ql\par  \'b7  {\*\bkmkstart er131390942686}{\*\bkmkend ru668601613155}Problem: {\*\bkmkstart qa570312493544}{\*\bkmkend fy314179041260}Prophylactic measure. \par  \'b7  {\*\bkmkstart xe754402202446}{\*\bkmkend am071057724468}Plan: {\*\bkmkstart to126711934336}{\*\bkmkend ax287384791916}F: LR\par  E: Replete as necessary, with goal K>4 and Mg>2\par  N: NPO \par  DVT: None\par  DISPO: Telemetry\par  CODE: DNR.\par  \par  }

## 2024-01-24 NOTE — DIETITIAN INITIAL EVALUATION ADULT - PROBLEM SELECTOR PLAN 2
Etiology likely orthostatic (in the setting of hypovolemia vs. general deconditioning vs. position change) vs.  cardiogenic (in the setting of atrial fibrillation seen in ED). No diuretic use that may contribute to hypovolemia.   Less likely Neurocardiogenic given no clear trigger such as occuring with cough, defacation, micturition, emotional stress; No clear prodromes were present such as diaphoresis, nausea, blurry vision. No association with migraines/hx c/w SAH "worst headache of life"  No known history of CKD, Amylodosis, POTS. Per pt, episode did not occur with positional changes/ denies lightheadedness with sitting to standing.  Less likely other cardiovascular etiologies including mechanical/valvular/PE, not caused by exertion.  No tongue-biting or prior hx to suggest seizure (although per ED note, patient was found having urinated on himself). No hypoglycemia or hypoxia.    -f/u Orthostatic vitals  -Telemetry monitoring  -Repeat EKG  -maintenance fluids

## 2024-01-24 NOTE — DIETITIAN INITIAL EVALUATION ADULT - PERTINENT MEDS FT
[Parents] : parents MEDICATIONS  (STANDING):  carvedilol 6.25 milliGRAM(s) Oral every 12 hours  dextrose 5%. 1000 milliLiter(s) (40 mL/Hr) IV Continuous <Continuous>  latanoprost 0.005% Ophthalmic Solution 1 Drop(s) Both EYES at bedtime  levothyroxine 88 MICROGram(s) Oral daily  pantoprazole  Injectable 40 milliGRAM(s) IV Push every 12 hours  piperacillin/tazobactam IVPB.. 4.5 Gram(s) IV Intermittent every 8 hours    MEDICATIONS  (PRN):  acetaminophen     Tablet .. 650 milliGRAM(s) Oral every 6 hours PRN Temp greater or equal to 38C (100.4F), Mild Pain (1 - 3)  aluminum hydroxide/magnesium hydroxide/simethicone Suspension 30 milliLiter(s) Oral every 4 hours PRN Dyspepsia  LORazepam   Injectable 0.5 milliGRAM(s) IV Push once PRN Agitation  melatonin 3 milliGRAM(s) Oral at bedtime PRN Insomnia  ondansetron Injectable 4 milliGRAM(s) IV Push every 8 hours PRN Nausea and/or Vomiting

## 2024-01-24 NOTE — CHART NOTE - NSCHARTNOTEFT_GEN_A_CORE
Please note that 3:22AM bloodwork was drawn near IV line which had zosyn (in D5) running. BMP from this time is thus inaccurate.    Repeat BMP pending.

## 2024-01-24 NOTE — CONSULT NOTE ADULT - ASSESSMENT
Assessment/Plan:  88y Female admitted for syncope work-up after falling 3 days ago and being down for 2 days, found to have new-onset hoarseness since 3 days ago. FEES done with SLP yesterday indicated muscle tension dysphonia. Scope today shows improvement which is consistent with patient's subjective voice improvement. Pt still has some residual muscle tension dysphonia. There is no vocal fold dysfunction noted on exam. Pt does have a R anterior tongue lesion that she described as painful - this could be an apthous ulcer.    -Recommend face tent with humidified air for secretions   -Recommend continue to work with SLP to help with muscle tension dysphonia  -Recommend patient follow-up with Dr. Palomares outpatient if tongue lesion persists/continues to be painful  -No intervention by ENT at this time    [facility] at [phone] with any questions/concerns.

## 2024-01-24 NOTE — DIETITIAN INITIAL EVALUATION ADULT - OTHER CALCULATIONS
Estimated nutritional needs determined using [facility] Standards of Nutrition Care for elderly repletion using current body weight 58.1 kg as no ht value available to determine ideal body weight.

## 2024-01-24 NOTE — PROGRESS NOTE ADULT - PROBLEM SELECTOR PLAN 6
Presented with ventricular ectopy. No previous cardiac history. Afib rule outed. Cardiology consulted.   Likely 2/2 electrolyte abnormalities.   TTE 1/23: normal b/l ventricular systolic function. mild MR.   - will continue to appreciate cardiology recs  - continue to monitor  - per cards, w/ ectopy burden, metoprolol 12.5 q8h w/ holding parameters, uptitrate as able -> increase metoprolol tartrate to 25 mg po BID       #Elevated Troponin   Troponin elevated, plateauing. EKG non-ischemic changes.  Likely 2/2 Demand ischemia in the setting of hypoperfusion/sepsis  - stop trending troponin Presented with ventricular ectopy. No previous cardiac history. Afib initially ruled outed, however run of afib 1/24 AM which returned to NSR.   Likely 2/2 electrolyte abnormalities versus contributions from afib as per above .   TTE 1/23: normal b/l ventricular systolic function. mild MR.   - will continue to appreciate cardiology recs  - continue to monitor  - Cardiology consulted, f/u their recs  - per cards, w/ ectopy burden and new run of afib, increase metoprolol to 25mg bid w/ holding parameters, uptitrate as able      #Elevated Troponin   Troponin elevated, plateauing. EKG non-ischemic changes.  Likely 2/2 Demand ischemia in the setting of hypoperfusion/sepsis  - stop trending troponin

## 2024-01-24 NOTE — DIETITIAN INITIAL EVALUATION ADULT - PERTINENT LABORATORY DATA
01-24    145  |  115<H>  |  25<H>  ----------------------------<  158<H>  3.8   |  21<L>  |  0.70    Ca    8.2<L>      24 Jan 2024 12:18  Phos  2.1     01-24  Mg     2.1     01-24    TPro  5.0<L>  /  Alb  2.7<L>  /  TBili  0.8  /  DBili  x   /  AST  93<H>  /  ALT  42  /  AlkPhos  88  01-24  POCT Blood Glucose.: 127 mg/dL (01-24-24 @ 03:49)

## 2024-01-24 NOTE — CONSULT NOTE ADULT - SUBJECTIVE AND OBJECTIVE BOX
Initial GI Consult Note:       HPI:  89YO F with PMHx of Glaucoma (Latanoprost), Hypothyroidism, HTN presents from [facility] due to being found conscious on the ground in her apartment. Per [facility] ED note, patient's building superintendant was concerned when patient did not  newspaper for 2 days and found patient lying awake on the floor and weak and thereafter called EMS. Patient lives alone, with no HHA, and uses cane to walk when outside but no cane/walker inside her home. Per patient, she does not recall what happened (lost consciousness), previously felt confused, but vehemently denies a fall. Her last recollection involves visiting her niece, Alba, in [place] and sitting in her living room to read. She denies any prior history of falls. She denies loss of consciousness, and remained on the floor for ~2 days.   She drinks approximately 1 oz glass of vodka once weekly, with her last drink on Friday.       Of note, upon arrival to [facility], extensive bruising/echymoses were apparent of various stages of healing dispersed throughout her buttocks, back, arms, and legs. When inquiring regarding her feeling safe, she reports yes and denies any concerns or fears of harm to her by others. She reports not having much support, as her brother passed away last year and most of her loved ones have passed away.     Otherwise, she denies any chest pain, abdominal pain, N/V/D, fever, fatigue, chills, or other pertinent Sx on ROS.     ED COURSE  ·	VITALS: T 95.6 F, /75, HR 90, RR 18, SpO2 97% on RA.  ·	LABS: WBC 28.09 (high), %Neutrophils 89.7 (high), %Lymphocytes 3.1, D-dimer 1148 (high), Troponin I 212.6 (high), Na 149 (high), Cl 114 (low), HCO3 19 (low), BUN 51 (high), Albumin 2.7 (low), Alkaline Phosphatase 129 (high),  (high), ALT 56 (high), CK 4434 (high), Pro-BNP 2629 (high), Lactate 3.7 (high). COVID/Influenza/RSV negative.  ·	IMAGING: CTPE negative. CT Hip reveals Degenerative changes of the lumbar spine and pelvis with no fractures. CT C/A/P No Contrast No definite evidence of solid visceral injury in the chest, abdomen or pelvis within the limits of this noncontrast study. CT Head and Cervical spine No Cont negative.  ·	INTERVENTIONS: Aspirin 324mg x1, Levaquin 750mg x1, 2L NS Bolus, IV Ativan 0.5mg x1. 2L NS Bolus.   (23 Jan 2024 04:52)    Allergies    Flagyl (Other)    Intolerances    Augmentin (Diarrhea)  NSAIDs (Other)  dairy products (Diarrhea)  Cipro (Diarrhea)    Home Medications:  LATANOPROST 0.005% EYE DROPS: INSTILL 1 DROP INTO AFFECTED EYE(S) ONCE DAILY IN THE EVENING (23 Jan 2024 07:18)  LEVOTHYROXINE 88 MCG TABLET: TAKE 1 TABLET BY MOUTH EVERY DAY (23 Jan 2024 07:19)  RAMIPRIL 10 MG CAPSULE: TAKE 1 CAPSULE BY MOUTH EVERY DAY (23 Jan 2024 07:19)    MEDICATIONS:  MEDICATIONS  (STANDING):  dextrose 5%. 1000 milliLiter(s) (40 mL/Hr) IV Continuous <Continuous>  latanoprost 0.005% Ophthalmic Solution 1 Drop(s) Both EYES at bedtime  levothyroxine 88 MICROGram(s) Oral daily  metoprolol tartrate 25 milliGRAM(s) Oral every 12 hours  pantoprazole  Injectable 40 milliGRAM(s) IV Push every 12 hours  piperacillin/tazobactam IVPB.. 4.5 Gram(s) IV Intermittent every 8 hours    MEDICATIONS  (PRN):  acetaminophen     Tablet .. 650 milliGRAM(s) Oral every 6 hours PRN Temp greater or equal to 38C (100.4F), Mild Pain (1 - 3)  aluminum hydroxide/magnesium hydroxide/simethicone Suspension 30 milliLiter(s) Oral every 4 hours PRN Dyspepsia  LORazepam   Injectable 0.5 milliGRAM(s) IV Push once PRN Agitation  melatonin 3 milliGRAM(s) Oral at bedtime PRN Insomnia  ondansetron Injectable 4 milliGRAM(s) IV Push every 8 hours PRN Nausea and/or Vomiting    PAST MEDICAL & SURGICAL HISTORY:  Hypothyroidism      HTN (hypertension)      Glaucoma      History of cataract surgery, right        FAMILY HISTORY:    SOCIAL HISTORY:  [place]ac[place]: [ ] Current, [ ] Former, [ ] Never; Pack Years:  Alcohol:  Illicit Drugs:    REVIEW OF SYSTEMS:  CONSTITUTIONAL: No weakness, fevers or chills  HEENT: No visual changes; No vertigo or throat pain   NECK: No pain or stiffness  RESPIRATORY: No cough, wheezing, hemoptysis; No shortness of breath  CARDIOVASCULAR: No chest pain or palpitations  GASTROINTESTINAL: No abdominal or epigastric pain. No nausea, vomiting, or hematemesis; No diarrhea or constipation. No melena or hematochezia.  GENITOURINARY: No dysuria, frequency or hematuria  NEUROLOGICAL: No numbness or weakness  SKIN: No itching, burning, rashes, or lesions   All other 10 review of systems is negative unless indicated above.    Vital Signs Last 24 Hrs  T(C): 36.7 (24 Jan 2024 14:21), Max: 36.7 (23 Jan 2024 22:50)  T(F): 98 (24 Jan 2024 14:21), Max: 98 (23 Jan 2024 22:50)  HR: 80 (24 Jan 2024 16:12) (80 - 86)  BP: 124/58 (24 Jan 2024 16:12) (124/58 - 168/79)  BP(mean): 83 (24 Jan 2024 16:12) (83 - 118)  RR: 17 (24 Jan 2024 16:12) (17 - 18)  SpO2: 97% (24 Jan 2024 16:12) (95% - 99%)    Parameters below as of 24 Jan 2024 16:12  Patient On (Oxygen Delivery Method): room air        01-23 @ 07:01 - 01-24 @ 07:00  --------------------------------------------------------  IN: 3080 mL / OUT: 450 mL / NET: 2630 mL    01-24 @ 07:01 - 01-24 @ 17:18  --------------------------------------------------------  IN: 580 mL / OUT: 500 mL / NET: 80 mL        PHYSICAL EXAM:    General: Well developed; well nourished; in no acute distress  Eyes: Anicteric sclerae, moist conjunctivae  HENT: Moist mucous membranes  Neck: Trachea midline, supple  Lungs: Normal respiratory effort, no intercostal retractions  Cardiovascular: RRR  Abdomen: Soft, non-tender non-distended; Normal bowel sounds; No rebound or guarding  Extremities: Normal range of motion, No clubbing, cyanosis or edema  Neurological: Alert and oriented x3  Skin: Warm and dry. No obvious rash    LABS:                        12.0   19.88 )-----------( 171      ( 24 Jan 2024 12:18 )             36.3     01-24    145  |  115<H>  |  25<H>  ----------------------------<  158<H>  3.8   |  21<L>  |  0.70    Ca    8.2<L>      24 Jan 2024 12:18  Phos  2.1     01-24  Mg     2.1     01-24    TPro  5.0<L>  /  Alb  2.7<L>  /  TBili  0.8  /  DBili  x   /  AST  93<H>  /  ALT  42  /  AlkPhos  88  01-24        PT/INR - ( 23 Jan 2024 03:44 )   PT: 14.2 sec;   INR: 1.25          PTT - ( 23 Jan 2024 03:44 )  PTT:23.5 sec    Urinalysis with Rflx Culture (collected 23 Jan 2024 03:04)    Culture - Blood (collected 22 Jan 2024 18:56)  Source: .Blood Blood-Peripheral  Preliminary Report (24 Jan 2024 01:04):    No growth at 24 hours    Culture - Blood (collected 22 Jan 2024 18:56)  Source: .Blood Blood-Peripheral  Preliminary Report (24 Jan 2024 01:04):    No growth at 24 hours      RADIOLOGY & ADDITIONAL STUDIES:     Reviewed   Initial GI Consult Note:     HPI:  89YO F with PMHx of Glaucoma (Latanoprost), Hypothyroidism, HTN presents from [facility] due to being found conscious on the ground in her apartment. Per [facility] ED note, patient's building superintendant was concerned when patient did not  newspaper for 2 days and found patient lying awake on the floor and weak and thereafter called EMS. Patient lives alone, with no HHA, and uses cane to walk when outside but no cane/walker inside her home. Per patient, she does not recall what happened (lost consciousness), previously felt confused, but vehemently denies a fall. Her last recollection involves visiting her niece, Alba, in [place] and sitting in her living room to read. She denies any prior history of falls. She denies loss of consciousness, and remained on the floor for ~2 days.   She drinks approximately 1 oz glass of vodka once weekly, with her last drink on Friday.       Of note, upon arrival to [facility], extensive bruising were apparent of various stages of healing dispersed throughout her buttocks, back, arms, and legs. When inquiring regarding her feeling safe, she reports yes and denies any concerns or fears of harm to her by others. She reports not having much support, as her brother passed away last year and most of her loved ones have passed away.     Otherwise, she denies any chest pain, abdominal pain, N/V/D, fever, fatigue, chills, or other pertinent Sx on ROS.     ED COURSE  VITALS: T 95.6 F, /75, HR 90, RR 18, SpO2 97% on RA.  LABS: WBC 28.09 (high), %Neutrophils 89.7 (high), %Lymphocytes 3.1, D-dimer 1148 (high), Troponin I 212.6 (high), Na 149 (high), Cl 114 (low), HCO3 19 (low), BUN 51 (high), Albumin 2.7 (low), Alkaline Phosphatase 129 (high),  (high), ALT 56 (high), CK 4434 (high), Pro-BNP 2629 (high), Lactate 3.7 (high). COVID/Influenza/RSV negative.  IMAGING: CTPE negative. CT Hip reveals Degenerative changes of the lumbar spine and pelvis with no fractures. CT C/A/P No Contrast No definite evidence of solid visceral injury in the chest, abdomen or pelvis within the limits of this noncontrast study. CT Head and Cervical spine No Cont negative.  INTERVENTIONS: Aspirin 324mg x1, Levaquin 750mg x1, 2L NS Bolus, IV Ativan 0.5mg x1. 2L NS Bolus.    Course complicated by melena, so GI consulted for UGIB.     Allergies    Flagyl (Other)    Intolerances    Augmentin (Diarrhea)  NSAIDs (Other)  dairy products (Diarrhea)  Cipro (Diarrhea)    Home Medications:  LATANOPROST 0.005% EYE DROPS: INSTILL 1 DROP INTO AFFECTED EYE(S) ONCE DAILY IN THE EVENING (23 Jan 2024 07:18)  LEVOTHYROXINE 88 MCG TABLET: TAKE 1 TABLET BY MOUTH EVERY DAY (23 Jan 2024 07:19)  RAMIPRIL 10 MG CAPSULE: TAKE 1 CAPSULE BY MOUTH EVERY DAY (23 Jan 2024 07:19)    MEDICATIONS:  MEDICATIONS  (STANDING):  dextrose 5%. 1000 milliLiter(s) (40 mL/Hr) IV Continuous <Continuous>  latanoprost 0.005% Ophthalmic Solution 1 Drop(s) Both EYES at bedtime  levothyroxine 88 MICROGram(s) Oral daily  metoprolol tartrate 25 milliGRAM(s) Oral every 12 hours  pantoprazole  Injectable 40 milliGRAM(s) IV Push every 12 hours  piperacillin/tazobactam IVPB.. 4.5 Gram(s) IV Intermittent every 8 hours    MEDICATIONS  (PRN):  acetaminophen     Tablet .. 650 milliGRAM(s) Oral every 6 hours PRN Temp greater or equal to 38C (100.4F), Mild Pain (1 - 3)  aluminum hydroxide/magnesium hydroxide/simethicone Suspension 30 milliLiter(s) Oral every 4 hours PRN Dyspepsia  LORazepam   Injectable 0.5 milliGRAM(s) IV Push once PRN Agitation  melatonin 3 milliGRAM(s) Oral at bedtime PRN Insomnia  ondansetron Injectable 4 milliGRAM(s) IV Push every 8 hours PRN Nausea and/or Vomiting    PAST MEDICAL & SURGICAL HISTORY:  Hypothyroidism      HTN (hypertension)      Glaucoma      History of cataract surgery, right        FAMILY HISTORY:    SOCIAL HISTORY:  [place]: [ ] Current, [ ] Former, [ ] Never; Pack Years:  Alcohol:  Illicit Drugs:      Vital Signs Last 24 Hrs  T(C): 36.7 (24 Jan 2024 14:21), Max: 36.7 (23 Jan 2024 22:50)  T(F): 98 (24 Jan 2024 14:21), Max: 98 (23 Jan 2024 22:50)  HR: 80 (24 Jan 2024 16:12) (80 - 86)  BP: 124/58 (24 Jan 2024 16:12) (124/58 - 168/79)  BP(mean): 83 (24 Jan 2024 16:12) (83 - 118)  RR: 17 (24 Jan 2024 16:12) (17 - 18)  SpO2: 97% (24 Jan 2024 16:12) (95% - 99%)    Parameters below as of 24 Jan 2024 16:12  Patient On (Oxygen Delivery Method): room air        01-23 @ 07:01  -  01-24 @ 07:00  --------------------------------------------------------  IN: 3080 mL / OUT: 450 mL / NET: 2630 mL    01-24 @ 07:01  - 01-24 @ 17:18  --------------------------------------------------------  IN: 580 mL / OUT: 500 mL / NET: 80 mL      PHYSICAL EXAM:  General: No acute distress, elderly, hard of hearing   Lungs: Normal respiratory effort and no intercostal retractions  Cardiovascular: RRR  Abdomen: Soft, non-tender, non-distended  Neurological: Alert and oriented x3   Skin: Warm and dry. No obvious rash but ecchymosis on arms and legs       LABS:                        12.0   19.88 )-----------( 171      ( 24 Jan 2024 12:18 )             36.3     01-24    145  |  115<H>  |  25<H>  ----------------------------<  158<H>  3.8   |  21<L>  |  0.70    Ca    8.2<L>      24 Jan 2024 12:18  Phos  2.1     01-24  Mg     2.1     01-24    TPro  5.0<L>  /  Alb  2.7<L>  /  TBili  0.8  /  DBili  x   /  AST  93<H>  /  ALT  42  /  AlkPhos  88  01-24        PT/INR - ( 23 Jan 2024 03:44 )   PT: 14.2 sec;   INR: 1.25          PTT - ( 23 Jan 2024 03:44 )  PTT:23.5 sec    Urinalysis with Rflx Culture (collected 23 Jan 2024 03:04)    Culture - Blood (collected 22 Jan 2024 18:56)  Source: .Blood Blood-Peripheral  Preliminary Report (24 Jan 2024 01:04):    No growth at 24 hours    Culture - Blood (collected 22 Jan 2024 18:56)  Source: .Blood Blood-Peripheral  Preliminary Report (24 Jan 2024 01:04):    No growth at 24 hours      RADIOLOGY & ADDITIONAL STUDIES:     Reviewed

## 2024-01-24 NOTE — PROGRESS NOTE ADULT - PROBLEM SELECTOR PLAN 2
Presented s/p fall. Pt with no recollection of event (though denies falls) but multiple sites of ecchymosis suggests fall.   Etiology unclear: suspect mechanical (?general deconditioning) vs orthostatic vs. less likely cardiogenic (no cardio Hx).  - f/u Orthostatics when reconditioned  - PT -> Rec STEFANO  - Telemetry monitoring w/ EKGs    #Elevated CK - IMPROVING.   Likely 2/2 fall and immobility after. Peaked and downtrending s/p IVF. Presented s/p fall. Pt with no recollection of event (though denies falls) but multiple sites of ecchymosis suggests fall.   Etiology unclear: suspect mechanical (general deconditioning) vs orthostatic vs. possible cardiogenic run of afib (seen while on tele).  - f/u orthostatics when reconditioned  - PT -> Rec STEFANO  - Telemetry monitoring w/ EKGs  - work up/manage afib as per below    #Elevated CK - IMPROVING.   Likely 2/2 fall and immobility after. Peaked and downtrending s/p IVF.

## 2024-01-24 NOTE — PROGRESS NOTE ADULT - ATTENDING COMMENTS
Patient is an 87 yo Female with PMHx of  PAD, HTN, HLD,  Hypothyroidism, monoclonal gammopathy being evaluated for MM, who presented to [facility] after being found on the ground in her apartment without loss of consciousness,  found to be in rhabdomyolysis, with AMS and electrolyte derangements. Cardiology consulted for Ectopy    REVIEW OF STUDIES:   - ECG 01/23/2024: Sinus tachycardia with bigeminy. Normal axis. No ischemic changes  - Tele 01/22-23/2024: Ventricular bigeminy and frequent couplets and PVCs  - TTE 1/23/2024: Normal LV function, no high gradients across LVOT ( 12mmHg) suggestive of LVOT obstruction, Grade 1 DD, mild MR, trace TR, PASP 32mmHg     # Ventricular Ectopy; pAfib  - Patient with no known CVD, followed by outpatient Cardiologist at [facility], Dr Clifton Elena, admitted with Rhabdomyolysis after sustaining fall with  prolonged immobilization, clinically improved  - Patient is not clear as to how she fell but denies anginal symptoms since.   - EKGs on admission reviewed showing NSR with frequent PVCS without ischemic changes  - Tele reviewed showing improved ectopy burden overall, however patient with episode of Afib witth RVR this am with return to NSR with PVCs snce  - ECHO this hospitalization reviewed showing normal Biv function without RWMA and intracavitary gradient of 12 mmhG  - At this time Given ectopy burden, would recommend increasing Lopressor to 25 mg po BID with holding parameters. Should patients' SBP remains persistently 140/90 and above, would swicth to Coreg 6.25 mg po BID with goal to uptitrate as tolerated for better BP and rate control  - Patient with CHADVASC of at least 5 placing her at higher thromboembolic risk. Given Hx of MGUS and MM, would challenge patient with Heparin gtt first. If able to tolerate, would than swicth to NOAC. Patient and family members are aware  - Would continue to Keep K > 4 and Magnesium > 2  - Given Intracavitary gradient additionally would avoid diuretics and maintain patient euvolemic  - Cardiology will cont to follow along with you, please call with any questions .

## 2024-01-24 NOTE — CONSULT NOTE ADULT - ATTENDING COMMENTS
Agree with the history and exam as above.  I agree with the assessment and plan.  I personally evaluated the patient including laryngoscopy.
Patient is an 89 yo Female with PMHx of  PAD, HTN, HLD,  Hypothyroidism, monoclonal gammopathy being evaluated for MM, who presented to [facility] after being found on the ground in her apartment without loss of consciousness,  found to be in rhabdomyolysis, with AMS and electrolyte derangements. Cardiology consulted for Ectopy    REVIEW OF STUDIES:   - ECG 01/23/2024: Sinus tachycardia with bigeminy. Normal axis. No ischemic changes  - Tele 01/22-23/2024: Ventricular bigeminy and frequent couplets and PVCs  - TTE 1/23/2024: Normal LV function, no high gradients across LVOT ( 12mmHg) suggestive of LVOT obstruction, Grade 1 DD, mild MR, trace TR, PASP 32mmHg     # Ventricular Ectopy  - Patient with no known CVD, followed by outpatient Cardiologist at [facility], Dr Clifton Elena, admitted with Rhabdomyolysis after sustaining fall with  prolonged immobilization  - Patient is not clear as to how she fell but denies anginal symptoms since.   - EKGs on admission reviewed showing NSR with frequent PVCS without ischemic changes  - Tele reviewed showing frequent PVCs, couplets and episodes of ventricular bigeminy, noted since admission  - ECHO this hospitalization reviewed showing normal Biv function without RWMA and intracavitary gradient of 12 mmhG  - At this time suspect ectopy related to marked electrolytes derangements, as arrhythmias never reported on prior outpatient cardiology visits.   - Patient without anginal symptoms today. Denies chest pain or SOB, however remains confused  - At this time Given ectopy burden, would recommend starting BB therapy with PO metoprolol 12.5 q8h with strict holding parameters with goal to uptitrate as tolerated.   - If ectopy burden continues or remains steady after 2-3 doses, would than increase lopressor to 25 mg po BID starting 1/24 am  - Would continue to Keep K > 4 and Magnesium > 2  - Given Intracavitary gradient additionally would avoid diuretics and maintain patient euvolemic  - Cardiology will cont to follow along with you, please call with any questions

## 2024-01-24 NOTE — DIETITIAN INITIAL EVALUATION ADULT - PROBLEM SELECTOR PLAN 6
Home meds: Levothyroxine 88mcg QD  TSH elevated at 8.727    -c/w IV 80% home PO dose (70mcg QD)  -Pending Speech&Swallow eval, switch to PO Levothyroxin 88mcg QD  -f/u BMP

## 2024-01-24 NOTE — DIETITIAN INITIAL EVALUATION ADULT - PROBLEM SELECTOR PLAN 1
#Dehydration  #Hypernatremia    DDx includes: Toxins (in the setting of alcohol use) vs. infectious etiology (meets severe sepsis criteria as below) vs. Uremic (BUN 51) vs. Metabolic (given hypothyroidism, electrolyte derangements and elevated TSH 8.727)  CT Head negative for intracranial acute pathology, therefore low suspicion for stroke and subarachnoid hemorrhage as etiology.  Less likely anticholinergic toxidrome given no medications that would suggest this and no decreased bowel sounds, hyperthermia, flushing of skin although pt is somewhat agitated.  Less likely adrenal crisis, given no hypoglycemia as above, No weakness (Muscle strength 4/5 B/L LE on exam), severe fatigue, unintentional weight loss, nausea, vomiting, abdominal pain, reduced appetite, back or limb pain although had initial loss of consciousness. Although some metabolic derangements seen as above.  Pt does not recall very much prior to or after the incident leading up to being in [facility] ED.   No tongue fasciculations present on ED or reported urinary incontinence or known seizure hx that may suggest Seizure as etiology of presentation.  CTPE negative. CT Hip reveals Degenerative changes of the lumbar spine and pelvis with no fractures. CT C/A/P No Contrast No definite evidence of solid visceral injury in the chest, abdomen or pelvis within the limits of this noncontrast study. CT Head and Cervical spine No Cont negative.    -f/u CT Head   -f/u Urine Drug Screen  -f/u Ammonia level  -do not give opioids for pain regimen  -IV Fluid Bolus (2L) then bolus as needed.  -Speech&Swallow Evaluation  -PT Evaluation

## 2024-01-24 NOTE — PROGRESS NOTE ADULT - PROBLEM SELECTOR PLAN 3
Presented w/ 2/4 SIRS (HR, WBC) w/ elevated lactate. Lactate resolved s/p IVF. No clear infectious source.   COVID/Influenza/RSV negative. UA shows few bacteria but WBC <10, neg LE/ nitrate. Leukocytosis downtrending (although elevated WBC can also be confounded by possible Dx of MM). s/p CTX  CTPE, CTAP unremarkable.  Procal 0.58.   DDx: Reactive iso fall/found down for multiple days vs. infectious (most likely source UTI)   - zosyn 4.5 empiric given AMS.   - f/u UCx, BCx   - labs, VS monitoring    #possible MM  pt is currently being worked up for MM per outpt.    - obtain collateral outpt cardiologist/ PCP Dr. Surendra Blanchard. Presented w/ 2/4 SIRS (HR, WBC) w/ elevated lactate. Lactate resolved s/p IVF. No clear infectious source.   COVID/Influenza/RSV negative. UA shows few bacteria but WBC <10, neg LE/ nitrate. Leukocytosis downtrending (although elevated WBC can also be confounded by possible Dx of MM). s/p CTX  CTPE, CTAP unremarkable.  Procal 0.58.   DDx: Reactive iso fall/found down for multiple days vs. infectious  - zosyn 4.5 empiric given AMS.   - f/u UCx, BCx   - labs, VS monitoring    #possible MM  pt is currently being worked up for MM per outpt.    - obtain collateral outpt cardiologist/ PCP Dr. Surendra Blanchard.

## 2024-01-24 NOTE — PROGRESS NOTE ADULT - ASSESSMENT
89YO F with PMHx of HTN, HLD, PAD, Hypothyroidism, monoclonal gammopathy who presented to [facility] after being found on the ground in her apartment without syncopal event. Admitted to medicine telemetry for rhabdomyolysis, AMS and multiple electrolyte derangements. Cardiology consulted for frequent PVC's.     REVIEW OF STUDIES:   Tele today:   ECG: Sinus tachycardia with bigeminy. Normal axis. No ischemic changes  TTE 1/23: Normal LV function, no high gradients across LVOT ( 12mmHg) suggestive of LVOT obstruction, Grade 1 DD, mild MR, trace TR, PASP 32mmHg     #Asymptomatic PVC's   Normal LV function with frequent PVC's ( couplets/ bigeminy/ isolated PVC's)   She is asymptomatic at this time   Given ectopy burden, started on metoprolol  Her PVC's are likely in the setting of electrolyte abnormalities/ rhabdomyolysis which are improving   Keep K > 4 and Magnesium > 2    #Troponin elevation   No active chest pain and non ischemic ECG  Trop elevation in the setting of rhabdomyolysis   Low suspicion for ACS event     INCOMPLETE      89YO F with PMHx of HTN, HLD, PAD, Hypothyroidism, monoclonal gammopathy who presented to [facility] after being found on the ground in her apartment without syncopal event. Admitted to medicine telemetry for rhabdomyolysis, AMS and multiple electrolyte derangements. Cardiology consulted for frequent PVC's.     REVIEW OF STUDIES:   Tele today: With runs of Afib, PVC's have reduced in frequency   ECG: Sinus tachycardia with bigeminy. Normal axis. No ischemic changes  TTE 1/23: Normal LV function, no high gradients across LVOT ( 12mmHg) suggestive of LVOT obstruction, Grade 1 DD, mild MR, trace TR, PASP 32mmHg     #Asymptomatic PVC's   Normal LV function with frequent PVC's ( couplets/ bigeminy/ isolated PVC's)   She is asymptomatic at this time   Given ectopy burden, started on metoprolol. Given runs of AF, increase metoprolol to 25mg BID   Her PVC's were likely in the setting of electrolyte abnormalities/ rhabdomyolysis which are improving   Keep K > 4 and Magnesium > 2    #Paroxysmal Afib   Noted on tele today   Increase metoprolol as above  CHADVASC 4 ( age/ gender/ vascular disease)   Recommend heparin gtt before starting NOAC but patient had episodes of melena this afternoon and is currently being workup up   AC when she can tolerate. In the future, if she is unable to tolerate AC, may need LAAO device     #Troponin elevation   No active chest pain and non ischemic ECG  Trop elevation in the setting of rhabdomyolysis   Low suspicion for ACS event     Cardiology to follow

## 2024-01-24 NOTE — PROGRESS NOTE ADULT - PROBLEM SELECTOR PLAN 8
AST>ALT w/ norm alk phos and bili suggestive of a hepatocellular pattern iso alcoholic hepatitis in addition to skeletal muscle injury given being found down.  Hypovolemic hypoperfusion also contributive.   - CMPs #r/o Melena  Alerted that pt had an episode of dark stool on 1/24 9am, assessed, appears dark and possibly melena.   Fecal occult blood positive. Patient had a repeat dark melena appearing stool later in AM.  Repeat CBC showed Hgb 11.8->12.0.  No known history of GI bleeds, patient has had a scope before (unsure exactly when) but was told it was normal.  - trend cbc  - monitor for further melena appearing stools  - consider GI consult   - hold off on AC at this time #r/o Melena  Alerted that pt had an episode of dark stool on 1/24 9am, assessed, appears dark and possibly melena.   Fecal occult blood positive. Patient had a repeat dark melena appearing stool later in AM.  Repeat CBC showed Hgb 11.8->12.0.  No known history of GI bleeds, patient has had a scope before (unsure exactly when) but was told it was normal.  - trend cbc  - PPI bid, pantoprazole 40mg  - monitor for further melena appearing stools  - consider GI consult   - hold off on AC at this time #r/o Melena  Alerted that pt had an episode of dark stool on 1/24 9am, assessed, appears dark and possibly melena.   Fecal occult blood positive. Patient had a repeat dark melena appearing stool later in AM.  Repeat CBC showed Hgb 11.8->12.0.  No known history of GI bleeds, patient has had a scope before (unsure exactly when) but was told it was normal.  - trend cbc  - PPI bid, pantoprazole 40mg  - monitor for further melena appearing stools  - GI consult for c/f gi bleed  - hold off on AC at this time

## 2024-01-24 NOTE — PROGRESS NOTE ADULT - SUBJECTIVE AND OBJECTIVE BOX
OVERNIGHT EVENTS:   - 7pm Na lateral at 151, K 3.0 (stable telemetry) - repleted 70meq. early AM labs drawn near [facility] in D5 (322am BMP is inaccurate). 4AM K improved to 3.6, repleted additional 40meq K powder. Na remains lateral at 150.    SUBJECTIVE:   -  Patient seen and examined at bedside, NAD.     Vital Signs Last 12 Hrs  T(F): 97.9 (01-24-24 @ 00:30), Max: 98 (01-23-24 @ 22:50)  HR: 86 (01-24-24 @ 04:15) (80 - 86)  BP: 168/79 (01-24-24 @ 04:15) (154/84 - 168/79)  BP(mean): 113 (01-24-24 @ 04:15) (112 - 118)  RR: 18 (01-24-24 @ 04:15) (18 - 18)  SpO2: 98% (01-24-24 @ 04:15) (95% - 98%)    I&O's Summary  22 Jan 2024 07:01  -  23 Jan 2024 07:00  --------------------------------------------------------  IN: 450 mL / OUT: 700 mL / NET: -250 mL    23 Jan 2024 07:01  -  24 Jan 2024 05:37  --------------------------------------------------------  IN: 2680 mL / OUT: 450 mL / NET: 2230 mL    PHYSICAL EXAM:  General: NAD  HEENT: NC/AT; PERRL; Neck Supple; dry MM, R sided tongue w/ no lesions seen. Voice is not at baseline and more hoarse per family   Respiratory: CTAB; no wheezes/rales/rhonchi, normal WOB  Cardiovascular: Regular rhythm/rate, + S1/S2; no murmurs, rubs gallops   Gastrointestinal: Soft; NTND; bowel sounds normal and present  : no suprapubic tenderness. + primifit  Vascular: extremities WWP; no edema/cyanosis  Neurological: A&Ox1, no obvious focal deficits  Integument: Extensive ecchymosis with various stages of healing on buttocks, trunk, dorsal and ventral aspects of UE and LE B/L. Stage I Pressure ulcers on L and R buttocks and mid-thoracic region of trunk. +Swelling and erythema of L hand.    LABS:                        11.8   20.21 )-----------( 177      ( 24 Jan 2024 04:10 )             34.8     01-24    150<H>  |  119<H>  |  28<H>  ----------------------------<  150<H>  3.6   |  21<L>  |  0.76    Ca    8.3<L>      24 Jan 2024 04:10  Phos  2.1     01-24  Mg     2.1     01-24    TPro  5.0<L>  /  Alb  2.7<L>  /  TBili  0.8  /  DBili  x   /  AST  93<H>  /  ALT  42  /  AlkPhos  88  01-24    PT/INR - ( 23 Jan 2024 03:44 )   PT: 14.2 sec;   INR: 1.25          PTT - ( 23 Jan 2024 03:44 )  PTT:23.5 sec  Urinalysis Basic - ( 24 Jan 2024 04:10 )    Color: x / Appearance: x / SG: x / pH: x  Gluc: 150 mg/dL / Ketone: x  / Bili: x / Urobili: x   Blood: x / Protein: x / Nitrite: x   Leuk Esterase: x / RBC: x / WBC x   Sq Epi: x / Non Sq Epi: x / Bacteria: x    RADIOLOGY & ADDITIONAL TESTS:  - reviewed    MEDICATIONS  (STANDING):  dextrose 5% 1000 milliLiter(s) (80 mL/Hr) IV Continuous <Continuous>  enoxaparin Injectable 40 milliGRAM(s) SubCutaneous every 24 hours  latanoprost 0.005% Ophthalmic Solution 1 Drop(s) Both EYES at bedtime  levothyroxine 88 MICROGram(s) Oral daily  metoprolol tartrate 12.5 milliGRAM(s) Oral every 8 hours  metoprolol tartrate Injectable 2.5 milliGRAM(s) IV Push Once  piperacillin/tazobactam IVPB.. 4.5 Gram(s) IV Intermittent every 8 hours  potassium chloride   Powder 40 milliEquivalent(s) Oral once    MEDICATIONS  (PRN):  acetaminophen     Tablet .. 650 milliGRAM(s) Oral every 6 hours PRN Temp greater or equal to 38C (100.4F), Mild Pain (1 - 3)  aluminum hydroxide/magnesium hydroxide/simethicone Suspension 30 milliLiter(s) Oral every 4 hours PRN Dyspepsia  LORazepam   Injectable 0.5 milliGRAM(s) IV Push once PRN Agitation  melatonin 3 milliGRAM(s) Oral at bedtime PRN Insomnia  ondansetron Injectable 4 milliGRAM(s) IV Push every 8 hours PRN Nausea and/or Vomiting   OVERNIGHT EVENTS:   - 7pm Na lateral at 151, K 3.0 (stable telemetry) - repleted 70meq. early AM labs drawn near [facility] in D5 (322am BMP is inaccurate). 4AM K improved to 3.6, repleted additional 40meq K powder. Na remains lateral at 150.    SUBJECTIVE:   -  Patient seen and examined at bedside, NAD.     Vital Signs Last 12 Hrs  T(F): 97.9 (01-24-24 @ 00:30), Max: 98 (01-23-24 @ 22:50)  HR: 86 (01-24-24 @ 04:15) (80 - 86)  BP: 168/79 (01-24-24 @ 04:15) (154/84 - 168/79)  BP(mean): 113 (01-24-24 @ 04:15) (112 - 118)  RR: 18 (01-24-24 @ 04:15) (18 - 18)  SpO2: 98% (01-24-24 @ 04:15) (95% - 98%)    I&O's Summary  22 Jan 2024 07:01  -  23 Jan 2024 07:00  --------------------------------------------------------  IN: 450 mL / OUT: 700 mL / NET: -250 mL    23 Jan 2024 07:01  -  24 Jan 2024 05:37  --------------------------------------------------------  IN: 2680 mL / OUT: 450 mL / NET: 2230 mL    PHYSICAL EXAM:  General: NAD  HEENT: NC/AT; PERRL; Neck Supple; dry MM, R sided tongue, mild blood on the lip. Voice is not at baseline and more hoarse per family   Respiratory: CTAB; no wheezes/rales/rhonchi, normal WOB  Cardiovascular: Regular rhythm/rate, + S1/S2; no murmurs, rubs gallops   Gastrointestinal: Soft; NTND; bowel sounds hyperactive BS four quads  : no suprapubic tenderness. + primafit  Vascular: extremities WWP; no edema/cyanosis  Neurological: A&Ox2, no obvious focal deficits  Integument: Extensive ecchymosis with various stages of healing on buttocks, trunk, dorsal and ventral aspects of UE and LE B/L. Stage I Pressure ulcers on L and R buttocks and mid-thoracic region of trunk. +Swelling and erythema of L hand.    LABS:                        11.8   20.21 )-----------( 177      ( 24 Jan 2024 04:10 )             34.8     01-24    150<H>  |  119<H>  |  28<H>  ----------------------------<  150<H>  3.6   |  21<L>  |  0.76    Ca    8.3<L>      24 Jan 2024 04:10  Phos  2.1     01-24  Mg     2.1     01-24    TPro  5.0<L>  /  Alb  2.7<L>  /  TBili  0.8  /  DBili  x   /  AST  93<H>  /  ALT  42  /  AlkPhos  88  01-24    PT/INR - ( 23 Jan 2024 03:44 )   PT: 14.2 sec;   INR: 1.25          PTT - ( 23 Jan 2024 03:44 )  PTT:23.5 sec  Urinalysis Basic - ( 24 Jan 2024 04:10 )    Color: x / Appearance: x / SG: x / pH: x  Gluc: 150 mg/dL / Ketone: x  / Bili: x / Urobili: x   Blood: x / Protein: x / Nitrite: x   Leuk Esterase: x / RBC: x / WBC x   Sq Epi: x / Non Sq Epi: x / Bacteria: x    RADIOLOGY & ADDITIONAL TESTS:  - reviewed    MEDICATIONS  (STANDING):  dextrose 5% 1000 milliLiter(s) (80 mL/Hr) IV Continuous <Continuous>  enoxaparin Injectable 40 milliGRAM(s) SubCutaneous every 24 hours  latanoprost 0.005% Ophthalmic Solution 1 Drop(s) Both EYES at bedtime  levothyroxine 88 MICROGram(s) Oral daily  metoprolol tartrate 12.5 milliGRAM(s) Oral every 8 hours  metoprolol tartrate Injectable 2.5 milliGRAM(s) IV Push Once  piperacillin/tazobactam IVPB.. 4.5 Gram(s) IV Intermittent every 8 hours  potassium chloride   Powder 40 milliEquivalent(s) Oral once    MEDICATIONS  (PRN):  acetaminophen     Tablet .. 650 milliGRAM(s) Oral every 6 hours PRN Temp greater or equal to 38C (100.4F), Mild Pain (1 - 3)  aluminum hydroxide/magnesium hydroxide/simethicone Suspension 30 milliLiter(s) Oral every 4 hours PRN Dyspepsia  LORazepam   Injectable 0.5 milliGRAM(s) IV Push once PRN Agitation  melatonin 3 milliGRAM(s) Oral at bedtime PRN Insomnia  ondansetron Injectable 4 milliGRAM(s) IV Push every 8 hours PRN Nausea and/or Vomiting

## 2024-01-24 NOTE — DIETITIAN INITIAL EVALUATION ADULT - PROBLEM SELECTOR PLAN 5
Likely 2/2 Demand ischemia in the setting of hypoperfusion/sepsis  Troponins elevated although downtrending 212.6>190.5>188  EKG non-ischemic changes.    -Trend troponin

## 2024-01-24 NOTE — DIETITIAN INITIAL EVALUATION ADULT - OTHER INFO
89YO F with PMHx of Glaucoma (Latanoprost), Hypothyroidism, HTN presents from [facility] due to being found conscious on the ground in her apartment, found to be in Atrial fibrillation with RVR and meeting SIRS criteria, now admitted to telemetry for work-up of syncope, altered mental status, and further monitoring.    Pt assessed at bedside on 7LA. Rx and labs reviewed. Pt presents with moderate temporal wasting. Pt received resting in bed, alert and participatory in nutrition assessment utilizing  87YO F with PMHx of Glaucoma (Latanoprost), Hypothyroidism, HTN presents from [facility] due to being found conscious on the ground in her apartment, found to be in Atrial fibrillation with RVR and meeting SIRS criteria, now admitted to telemetry for work-up of syncope, altered mental status, and further monitoring.    Pt assessed at bedside on 7LA. Rx and labs reviewed. Pt presents with moderate temporal wasting. No ht value in EMR; pt reports ht of 63 in. Pt received resting in bed, alert and participatory in nutrition assessment utilizing assistive hearing device. Pt reports a good appetite and reflective PO intake. Pt with unknown usual body weight. Pt reports no significant changes to wt, appetite, or PO intake PTA. FEES completed yesterday 1/23 and cleared for soft/bite-sized textures. Documented intolerance to dairy; pt reports no allergies or intolerances, encouraged pt to order preferred foods. Disagreeable to oral nutrition supplement regimen; encouraged good PO intake with emphasis on protein. No complaints of nausea/vomiting/constipation/diarrhea or difficult chew/swallow with current therapeutic textures. RDN will continue to reassess, intervene, and monitor as appropriate.     Pain: 0 per chart review  GI: Abdomen non-distended/non-tender, +BS x4, last bowel movement 1/24 -diarrhea, dark brown  Skin: pressure injury stage I head, pressure injury stage I bilateral buttocks, pressure injury stage I sacrum, +4 L wrist

## 2024-01-24 NOTE — CONSULT NOTE ADULT - ASSESSMENT
88F with PMH of glaucoma (Latanoprost), hypothyroidism, HTN, worked up MM, presented to ED due to being found conscious on the ground in her apartment, now admitted to telemetry for work-up of syncope, altered mental status, and further monitoring. Course complicated by 4-5 episodes of melena on 01/24/24, so GI consulted for UGIB.    Patient states she had an EGD and colonoscopy 10 or so years ago. Has remained hemodynamically stable with Hb of 12.     Recommendations:    - trend Hb and maintain active type and screen   - start pantoprazole 40 mg IV BID   - will consider EGD once patient is no longer on vEEG   - monitor for further episodes of melena     Case discussed with Dr. Schmid. GI Team will continue to follow.     Jacquelyn Gamble D.O.   Gastroenterology Fellow  Weekday 7am-5pm Pager: [phone]  Weeknights/Weekend/Holiday Coverage: Please call the  for contact info

## 2024-01-24 NOTE — PROGRESS NOTE ADULT - PROBLEM SELECTOR PLAN 10
Fluid: D5 w/ 1AMP Bicarb  Electrolytes: replete PRN, cautions with hypernatremia correction as above.   Nutrition: soft bite sized  DVT ppx: lovenox  GI ppx: none for now   Code: FULL  Dispo: telemetry Downtrending.   AST>ALT w/ norm alk phos and bili suggestive of a hepatocellular pattern iso alcoholic hepatitis in addition to skeletal muscle injury given being found down.  Hypovolemic hypoperfusion also contributive.   - CMPs

## 2024-01-24 NOTE — DIETITIAN INITIAL EVALUATION ADULT - PROBLEM SELECTOR PLAN 7
Upon arrival to [facility], EKG reads Sinus Tachycardia with PVCs ()  No prior history of Atrial fibrillation    -Metoprolol 2.5mg Q6 PRN  -f/u TTE

## 2024-01-24 NOTE — DIETITIAN INITIAL EVALUATION ADULT - PROBLEM SELECTOR PLAN 4
AST>ALT 2:1 ratio 2/2 alcohol use  216/56, Alkaline phosphatase 129 (high)   Pt reports drinking 1 oz vodka 1x/week    -Trend LFTs  -f/u Acetaminophen level  -f/u Salicyclate level

## 2024-01-24 NOTE — DIETITIAN INITIAL EVALUATION ADULT - PROBLEM SELECTOR PLAN 8
Possibly in the setting of multiple falls.  Pt is not on any anti-coagulants in home medications.  Extensive echymosis with various stages of healing on buttocks, back, LE and UE dorsal and ventral surfaces. Stage I Pressure ulcers on L and R buttocks and mid-thoracic region of trunk.   When inquiring regarding her feeling safe, she reports yes and denies any concerns or fears of harm to her by others. She reports not having much support, as her brother passed away last year and most of her loved ones have passed away.     -f/u platelets  -f/u PT/PTT/INR

## 2024-01-24 NOTE — DIETITIAN INITIAL EVALUATION ADULT - NS FNS DIET ORDER
Diet, Regular:   Soft and Bite Sized (SOFTBTSZ)  Mildly Thick Liquids (MILDTHICKLIQS) (01-24-24 @ 13:09)

## 2024-01-24 NOTE — PROGRESS NOTE ADULT - PROBLEM SELECTOR PLAN 5
P/w AMS. Per family bedside voice is more hoarse than baseline. Failed dysphagia testing this admission.    SS consulted. FEES 1/23 showed severe pharyngeal dysphagia. However vocal ford found to be inadequately closing.  - aspiration precautions.   - soft, bite sized diet w/ thin liquid per SS recs.   - f/u ENT consult for potential vocal cord dysfunction No known history of Afib, HR 84. Not on on home medications as patient unaware of afib/never has been told she has afib.   It is possible patient had this prior to admission and has paroxysmal afib, versus potential sickness triggering the afib.   - CHADSVASC 3  - HASBLED 1  - hold heparing / Eliquis anticoagulation at this time given concern for GI bleed  - Cardiology following, f/u their recs  - per cardiology, will consider trialing heparin/lovenox given MGUS/MM history, however will hold off on this at this time given concern for GI bleed  - c/w metoprolol 25mg q12 for rate control  - continued discussion regarding AC versus holding off on AC given bleeding risk  - Goal HR <110 [RACE II trial]

## 2024-01-24 NOTE — DIETITIAN INITIAL EVALUATION ADULT - PROBLEM SELECTOR PLAN 9
2/2 Fall.  CK elevated [id number]    -f/u CK in AM  -PT Eval      #HTN  Home medication: Ramapril (unknown dose to pt)    -hold home med for now given NPO status

## 2024-01-24 NOTE — PROGRESS NOTE ADULT - PROBLEM SELECTOR PLAN 1
#Metabolic Encephalopathy  Presented with AMS. Found down after 2 days after likely fall at home. UTox, salicylate, acetaminophen neg.   However c/o R lateral tongue pain.   CTH: neg for acute pathology. neurological causes unlikely.   DDx: infectious (meets severe sepsis criteria as below) vs. Uremia (BUN 51) vs. Metabolic hypothyroidism vs electrolyte derangements) vs. seizure.   - vEEG r/o seizure  - correct electrolytes, K repleted overnight. HyperNa as per below.   - empiric Abx: Zosyn 4.5 q8. Improving.   Presented with AMS. Found down after 2 days after likely fall at home. UTox, salicylate, acetaminophen neg.   However c/o R lateral tongue pain.   CTH: neg for acute pathology. neurological causes unlikely.   DDx: infectious (meets severe sepsis criteria as below) vs. Uremia (elevated BUN) vs. Metabolic hypothyroidism vs electrolyte derangements vs. seizure.   - vEEG r/o seizure, still pending  - correct electrolytes, K repleted overnight. HyperNa as per below.   - empiric Abx: c Zosyn 4.5 q8.

## 2024-01-24 NOTE — PROGRESS NOTE ADULT - ASSESSMENT
87yo F with PMHx of Glaucoma, Hypothyroidism, HTN who presented from [facility] due to being found conscious on the ground in her apartment after a fall, found to meet SIRS criteria (sepsis vs. reactive) and ectopy, now admitted to telemetry for work-up of syncope, altered mental status, and further monitoring.

## 2024-01-24 NOTE — DIETITIAN INITIAL EVALUATION ADULT - ADD RECOMMEND
-Continue current diet with appropriate textures/consistencies per SLP  -Encourage good PO intake   -Honor food preferences as able  -Align nutrition with goals of care at all times  -Weekly wts  -Monitor chemistry, GI function, and skin integrity

## 2024-01-24 NOTE — PROGRESS NOTE ADULT - PROBLEM SELECTOR PLAN 7
Presented with extensive ecchymosis with various stages of healing on buttocks, back, LE and UE dorsal and ventral surfaces. Stage I Pressure ulcers on L and R buttocks and mid-thoracic region of trunk.   Reports minimal to no support at home.  CTAP noncon 1/22: no evidence of hematoma / fractures.   Most in the setting of falls. No AC medication.  - JA P/w AMS. Per family bedside voice is more hoarse than baseline. Failed dysphagia testing this admission.    SS consulted. FEES 1/23 showed severe pharyngeal dysphagia. However vocal ford found to be inadequately closing.  - aspiration precautions.   - soft, bite sized diet w/ thin liquid per SS recs.   - f/u ENT consult for potential vocal cord dysfunction  - no straws at bedside to prevent aspiration

## 2024-01-24 NOTE — PROGRESS NOTE ADULT - SUBJECTIVE AND OBJECTIVE BOX
INTERVAL EVENTS: No acute events     PAST MEDICAL & SURGICAL HISTORY:  Diverticulitis    Osteoporosis    Hypothyroidism    HTN (hypertension)    Cnoway's esophagus    Depression    Glaucoma    History of cataract surgery, right        MEDICATIONS  (STANDING):  dextrose 5% 1000 milliLiter(s) (100 mL/Hr) IV Continuous <Continuous>  enoxaparin Injectable 40 milliGRAM(s) SubCutaneous every 24 hours  latanoprost 0.005% Ophthalmic Solution 1 Drop(s) Both EYES at bedtime  levothyroxine 88 MICROGram(s) Oral daily  metoprolol tartrate 12.5 milliGRAM(s) Oral every 8 hours  metoprolol tartrate Injectable 2.5 milliGRAM(s) IV Push Once  piperacillin/tazobactam IVPB.. 4.5 Gram(s) IV Intermittent every 8 hours    MEDICATIONS  (PRN):  acetaminophen     Tablet .. 650 milliGRAM(s) Oral every 6 hours PRN Temp greater or equal to 38C (100.4F), Mild Pain (1 - 3)  aluminum hydroxide/magnesium hydroxide/simethicone Suspension 30 milliLiter(s) Oral every 4 hours PRN Dyspepsia  LORazepam   Injectable 0.5 milliGRAM(s) IV Push once PRN Agitation  melatonin 3 milliGRAM(s) Oral at bedtime PRN Insomnia  ondansetron Injectable 4 milliGRAM(s) IV Push every 8 hours PRN Nausea and/or Vomiting    Vital Signs Last 24 Hrs  T(C): 36.6 (24 Jan 2024 06:36), Max: 36.7 (23 Jan 2024 22:50)  T(F): 97.9 (24 Jan 2024 06:36), Max: 98 (23 Jan 2024 22:50)  HR: 84 (24 Jan 2024 08:58) (80 - 94)  BP: 137/90 (24 Jan 2024 08:58) (125/83 - 168/79)  BP(mean): 109 (24 Jan 2024 08:58) (97 - 118)  RR: 18 (24 Jan 2024 08:58) (15 - 19)  SpO2: 99% (24 Jan 2024 08:58) (95% - 99%)    Parameters below as of 24 Jan 2024 08:58  Patient On (Oxygen Delivery Method): room air        PHYSICAL EXAM:  \GEN: Awake, comfortable. NAD.   HEENT: NCAT, PERRL, EOMI. Mucosa moist. No JVD.   RESP: CTA b/l  CV: RRR, normal s1/s2. systolic murmur   ABD: Soft, NTND. BS+  EXT: Warm. No edema, clubbing, or cyanosis.   LABS:                        11.8   20.21 )-----------( 177      ( 24 Jan 2024 04:10 )             34.8     01-24    150<H>  |  119<H>  |  28<H>  ----------------------------<  150<H>  3.6   |  21<L>  |  0.76    Ca    8.3<L>      24 Jan 2024 04:10  Phos  2.1     01-24  Mg     2.1     01-24    TPro  5.0<L>  /  Alb  2.7<L>  /  TBili  0.8  /  DBili  x   /  AST  93<H>  /  ALT  42  /  AlkPhos  88  01-24    CARDIAC MARKERS ( 23 Jan 2024 08:55 )  x     / x     / 3944 U/L / x     / 67.1 ng/mL  CARDIAC MARKERS ( 23 Jan 2024 03:44 )  x     / x     / 5183 U/L / x     / x      CARDIAC MARKERS ( 22 Jan 2024 17:58 )  x     / x     / 4434 U/L / x     / x          PT/INR - ( 23 Jan 2024 03:44 )   PT: 14.2 sec;   INR: 1.25          PTT - ( 23 Jan 2024 03:44 )  PTT:23.5 sec  Urinalysis Basic - ( 24 Jan 2024 04:10 )    Color: x / Appearance: x / SG: x / pH: x  Gluc: 150 mg/dL / Ketone: x  / Bili: x / Urobili: x   Blood: x / Protein: x / Nitrite: x   Leuk Esterase: x / RBC: x / WBC x   Sq Epi: x / Non Sq Epi: x / Bacteria: x      I&O's Summary    23 Jan 2024 07:01  -  24 Jan 2024 07:00  --------------------------------------------------------  IN: 3080 mL / OUT: 450 mL / NET: 2630 mL    24 Jan 2024 07:01  -  24 Jan 2024 09:38  --------------------------------------------------------  IN: 0 mL / OUT: 500 mL / NET: -500 mL

## 2024-01-24 NOTE — PROGRESS NOTE ADULT - PROBLEM SELECTOR PLAN 4
P/w hypernatremia 149 s/p fall and poor PO intake x 2 days. Most likely 2/2 hypovolemia, evident w/ concentrated urine on exam. 2L Free water deficit around 2L. on 1L D5 w/ 1AMP bicarb @ 80cc/hr.   - correct free water deficit  - cautions with overcorrection (goal <0.5meq /hr)   - CMPs    #HAGMA - RESOLVED  #elevated serum lactate - RESOLVED  POA w/ HAGMA w/ most likely cause to be lactic acidosis. resolved w/ D5 w/ 1AMP bicarb P/w hypernatremia 149 s/p fall and poor PO intake x 2 days. Most likely 2/2 hypovolemia, evident w/ concentrated urine on exam. 2.1L Free water deficit. on 1L D5 w/ 1AMP bicarb @ 80cc/hr.   - correct free water deficit  - stop the bicarb in D5, increase D5 rate to 100c/hr  - cautions with overcorrection (goal <0.5meq /hr)   - CMPs    #HAGMA - RESOLVED  #elevated serum lactate - RESOLVED  POA w/ HAGMA w/ most likely cause to be lactic acidosis. resolved w/ D5 w/ 1AMP bicarb

## 2024-01-24 NOTE — PROGRESS NOTE ADULT - PROBLEM SELECTOR PLAN 12
Fluid: D5 100cc/hr  Electrolytes: replete PRN, cautions with hypernatremia correction as above.   Nutrition: soft bite sized  DVT ppx: hold for now iso c/f gi bleed  GI ppx: none for now   Code: FULL  Dispo: telemetry Fluid: D5 100cc/hr  Electrolytes: replete PRN, cautions with hypernatremia correction as above.   Nutrition: soft bite sized  DVT ppx: hold for now iso c/f gi bleed  GI ppx: none for now   [facility]  Dispo: telemetry

## 2024-01-24 NOTE — PROGRESS NOTE ADULT - PROBLEM SELECTOR PLAN 11
TSH elevated at 8.727. FT4 WNL. Subclinical.  - continue home Levothyroxine 88mcg PO    #Glaucoma  - continue home med Latanoprost 0.005%    #HTN  - holding Home med Ramipril 10 i/s/o hypovolemia.

## 2024-01-24 NOTE — PROGRESS NOTE ADULT - ATTENDING COMMENTS
Pt with episode of Afib this AM but two melanotic stools. GI to see and will continue beta blocker. No anticoagulation in setting of melena and discussed with pt and her niece. Hb stable. Continue zosyn although no sign of obvious source of sepsis except bronchiectesis on CT and WBC remains elevated. Goals of care appreciated.

## 2024-01-24 NOTE — DIETITIAN INITIAL EVALUATION ADULT - PROBLEM SELECTOR PLAN 3
Patient meeting 3/4 SIRS criteria (HR>90, WBC >12, lactate >2) w/o clear source.   No respiratory (cough, SOB), abdominal, or urinary complaints upon admission. No fever, fatigue, chills.  COVID/Influenza/RSV negative.  CTPE unremarkable.  -CTX 1g QD x5 days for empiric cystitic tx (1/23-1/27)  -f/u UA with Reflex Culture  -f/u Procalcitonin  -Trend lactate to clear.  -Avoid anti-cholinergics (can exacerbate urinary retention)  - Monitor WBC  - Monitor for fevers  - Monitor VS  - f/u BCx

## 2024-01-24 NOTE — CONSULT NOTE ADULT - SUBJECTIVE AND OBJECTIVE BOX
Patient is a 88y old  Female who presents with a chief complaint of syncope/fall (24 Jan 2024 05:37)      HPI:  89YO F with PMHx of Glaucoma (Latanoprost), Hypothyroidism, HTN presents from [facility] due to being found conscious on the ground in her apartment. Per [facility] ED note, patient's building superintendant was concerned when patient did not  newspaper for 2 days and found patient lying awake on the floor and weak and thereafter called EMS. Patient lives alone, with no HHA, and uses cane to walk when outside but no cane/walker inside her home. Per patient, she does not recall what happened (lost consciousness), previously felt confused, but vehemently denies a fall. Her last recollection involves visiting her niece, Alba, in [place] and sitting in her living room to read. She denies any prior history of falls. She denies loss of consciousness, and remained on the floor for ~2 days.   She drinks approximately 1 oz glass of vodka once weekly, with her last drink on Friday.       Of note, upon arrival to [facility], extensive bruising/echymoses were apparent of various stages of healing dispersed throughout her buttocks, back, arms, and legs. When inquiring regarding her feeling safe, she reports yes and denies any concerns or fears of harm to her by others. She reports not having much support, as her brother passed away last year and most of her loved ones have passed away.     Otherwise, she denies any chest pain, abdominal pain, N/V/D, fever, fatigue, chills, or other pertinent Sx on ROS.     ED COURSE  ·	VITALS: T 95.6 F, /75, HR 90, RR 18, SpO2 97% on RA.  ·	LABS: WBC 28.09 (high), %Neutrophils 89.7 (high), %Lymphocytes 3.1, D-dimer 1148 (high), Troponin I 212.6 (high), Na 149 (high), Cl 114 (low), HCO3 19 (low), BUN 51 (high), Albumin 2.7 (low), Alkaline Phosphatase 129 (high),  (high), ALT 56 (high), CK 4434 (high), Pro-BNP 2629 (high), Lactate 3.7 (high). COVID/Influenza/RSV negative.  ·	IMAGING: CTPE negative. CT Hip reveals Degenerative changes of the lumbar spine and pelvis with no fractures. CT C/A/P No Contrast No definite evidence of solid visceral injury in the chest, abdomen or pelvis within the limits of this noncontrast study. CT Head and Cervical spine No Cont negative.  ·	INTERVENTIONS: Aspirin 324mg x1, Levaquin 750mg x1, 2L NS Bolus, IV Ativan 0.5mg x1. 2L NS Bolus.   (23 Jan 2024 04:52)    PAST MEDICAL & SURGICAL HISTORY:  Hypothyroidism      HTN (hypertension)      Glaucoma      History of cataract surgery, right        MEDICATIONS  (STANDING):  dextrose 5% 1000 milliLiter(s) (100 mL/Hr) IV Continuous <Continuous>  enoxaparin Injectable 40 milliGRAM(s) SubCutaneous every 24 hours  latanoprost 0.005% Ophthalmic Solution 1 Drop(s) Both EYES at bedtime  levothyroxine 88 MICROGram(s) Oral daily  metoprolol tartrate 12.5 milliGRAM(s) Oral every 8 hours  metoprolol tartrate Injectable 2.5 milliGRAM(s) IV Push Once  piperacillin/tazobactam IVPB.. 4.5 Gram(s) IV Intermittent every 8 hours    MEDICATIONS  (PRN):  acetaminophen     Tablet .. 650 milliGRAM(s) Oral every 6 hours PRN Temp greater or equal to 38C (100.4F), Mild Pain (1 - 3)  aluminum hydroxide/magnesium hydroxide/simethicone Suspension 30 milliLiter(s) Oral every 4 hours PRN Dyspepsia  LORazepam   Injectable 0.5 milliGRAM(s) IV Push once PRN Agitation  melatonin 3 milliGRAM(s) Oral at bedtime PRN Insomnia  ondansetron Injectable 4 milliGRAM(s) IV Push every 8 hours PRN Nausea and/or Vomiting          Home Living Status :  lives alone in an elevator accessible apartment building          -  Prior Home Care Services :  none        Baseline Functional Level Prior to Admission :             - ADL's/ IADL's :  independent          - Ambulatory status prior to admission :   walked with a cane          FAMILY HISTORY:      CBC Full  -  ( 24 Jan 2024 04:10 )  WBC Count : 20.21 K/uL  RBC Count : 3.64 M/uL  Hemoglobin : 11.8 g/dL  Hematocrit : 34.8 %  Platelet Count - Automated : 177 K/uL  Mean Cell Volume : 95.6 fl  Mean Cell Hemoglobin : 32.4 pg  Mean Cell Hemoglobin Concentration : 33.9 gm/dL  Auto Neutrophil # : x  Auto Lymphocyte # : x  Auto Monocyte # : x  Auto Eosinophil # : x  Auto Basophil # : x  Auto Neutrophil % : x  Auto Lymphocyte % : x  Auto Monocyte % : x  Auto Eosinophil % : x  Auto Basophil % : x      01-24    150<H>  |  119<H>  |  28<H>  ----------------------------<  150<H>  3.6   |  21<L>  |  0.76    Ca    8.3<L>      24 Jan 2024 04:10  Phos  2.1     01-24  Mg     2.1     01-24    TPro  5.0<L>  /  Alb  2.7<L>  /  TBili  0.8  /  DBili  x   /  AST  93<H>  /  ALT  42  /  AlkPhos  88  01-24      Urinalysis Basic - ( 24 Jan 2024 04:10 )    Color: x / Appearance: x / SG: x / pH: x  Gluc: 150 mg/dL / Ketone: x  / Bili: x / Urobili: x   Blood: x / Protein: x / Nitrite: x   Leuk Esterase: x / RBC: x / WBC x   Sq Epi: x / Non Sq Epi: x / Bacteria: x        Radiology :     < from: Xray Hand 2 Views, Left (01.23.24 @ 06:47) >  ACC: [id number] EXAM:  XR HAND 2 VIEWS LT   ORDERED BY: MARIAMA VILLAFANA     PROCEDURE DATE:  01/23/2024          INTERPRETATION:  EXAMINATION: XR HAND 2 VIEWS LEFT    CLINICAL INDICATION: Pain. Left dorsum of hand. Concern for fracture.    COMPARISON: None.    TECHNIQUE: 2 views of the left hand.    IMPRESSION:      Radiodense ring limits evaluation of the fourth proximal phalanx.    No acute displaced fracture or dislocation. Osteoarthritic changes   throughout the hand and wrist. Negative ulnar variance.    < from: CT Angio Chest PE Protocol w/ IV Cont (01.22.24 @ 21:41) >  ACC: [id number] EXAM:  CT ANGIO CHEST PULM ART WAWIC   ORDERED BY: SAVITA CONNOR     PROCEDURE DATE:  01/22/2024          INTERPRETATION:  CLINICAL INFORMATION: Shortness of breath, elevated   d-dimer with atrial fibrillation.    COMPARISON: Noncontrast CT chest performed earlier same day 1/22/2024 at   5:57 PM    CONTRAST/COMPLICATIONS:  IV Contrast: Omnipaque 350  70 cc administered   30 cc discarded  Oral Contrast: None  Complications: None reported at time of study completion    PROCEDURE:  CT Angiography of the Chest.  Sagittal and coronal reformats were performed as well as 3D (MIP)   reconstructions.    FINDINGS:    LUNGS AND AIRWAYS: No parenchymal consolidation or mass. Biapical pleural   parenchymal scarring. Chronic interstitial changes with paraseptal   emphysema. Scattered cylindrical and cystic bronchiectasis. Patent   central airways.  PLEURA: No pleural effusion or pneumothorax.  MEDIASTINUM AND GRAHAM: No lymphadenopathy.  VESSELS: No pulmonary embolism. The bilateral pulmonary arteries are   dilated measuring up to 2.6 cm on the right and 3.5 cm on the left which   may indicate pulmonary hypertension which could be related to pulmonary   hypertension. Pulmonary trunk is normal caliber. Atherosclerotic changes   of aorta.  HEART: Borderline cardiomegaly. Thickened myocardium of the left   ventricle suggesting hypertrophy. No pericardial effusion.  CHEST WALL AND LOWER NECK: Within normal limits.  VISUALIZED UPPER ABDOMEN: Within normal limits.  BONES: Degenerative changes.    IMPRESSION:    1. No pulmonary embolism  2. Additional findings as above.    < from: CT Head No Cont (01.22.24 @ 18:04) >    ACC: [id number] EXAM:  CT CERVICAL SPINE   ORDERED BY: SAVITA CONNOR     ACC: [id number] EXAM:  CT BRAIN   ORDERED BY: SAVITA CONNOR     PROCEDURE DATE:  01/22/2024          INTERPRETATION:  CLINICAL INDICATION: Trauma.    Technique: Noncontrast axial CT of the head and cervical spine was   performed. Coronal and sagittal reformats were obtained.      COMPARISON: None.    FINDINGS:  Head CT:  The ventricles and sulci are prominent, compatible with age-related   generalized cerebral volume loss. There is periventricular   hypoattenuation, likely reflecting chronic microvascular ischemic   changes. There is no intraparenchymal hematoma, mass effect or midline   shift. No abnormal extra-axial fluid collections or hemorrhages are   present.    The calvarium is intact. The visualized intraorbital compartment,   paranasal sinuses and tympanomastoid cavities appear free of acute   disease.      Cervical spine CT:  Alignment is maintained. Vertebral bodies are normal in height, without   evidence of fracture or dislocation. Prevertebral soft tissues are within   normal limits without soft tissue swelling or hematoma.    There is multilevel intervertebral disc height loss. Multilevel endplate   spondylytic changes are present. Multilevel disc bulging results in   multilevel neural foraminal narrowing. No high-grade spinal canal   stenosis is identified.    There are fibrotic appearing changes in the very partially visualized   bilateral lung apices.      IMPRESSION:  CT HEAD: No acute abnormality. Chronic changes as above.  CT CERVICAL SPINE: No acute abnormality. Chronic changes as above.    < end of copied text >          Review of Systems : per HPI         Vital Signs Last 24 Hrs  T(C): 36.6 (24 Jan 2024 06:36), Max: 36.7 (23 Jan 2024 22:50)  T(F): 97.9 (24 Jan 2024 06:36), Max: 98 (23 Jan 2024 22:50)  HR: 86 (24 Jan 2024 04:15) (80 - 96)  BP: 168/79 (24 Jan 2024 04:15) (125/83 - 168/79)  BP(mean): 113 (24 Jan 2024 04:15) (92 - 118)  RR: 18 (24 Jan 2024 04:15) (14 - 19)  SpO2: 98% (24 Jan 2024 04:15) (95% - 98%)    Parameters below as of 24 Jan 2024 04:15  Patient On (Oxygen Delivery Method): room air            Physical Exam :   88 y o False [place] woman lying comfortably in semi Cole's position , awake , alert , no acute complaints     Head : normocephalic , atraumatic    Eyes : PERRLA , EOMI , no nystagmus , sclera anicteric    ENT / FACE : neg nasal discharge , uvula midline , no oropharyngeal erythema / exudate    Neck : supple , negative JVD , negative carotid bruits , no thyromegaly    Chest : CTA bilaterally , neg wheeze / rhonchi / rales / crackles / egophany    Cardiovascular : regular rate and rhythm ,  II/VI systolic murmur     Abdomen : soft , non distended , non tender to palpation in all 4 quadrants ,  normal bowel sounds     Extremities : WWP , neg cyanosis /clubbing / edema       Skin : diffuse UE/LE/trunk bruising , St 1 buttock       :     Neurologic Exam :     Alert and oriented to person , place , date/year , speech fluent w/o dysarthria     Cranial Nerves:           II :                         pupils equal , round and reactive to light , visual fields intact         III/ IV/VI :              extraocular movements intact , neg nystagmus , neg ptosis        V :                        facial sensation intact , V1-3 normal        VII :                      face symmetric , no droop , normal eye closure and smile        VIII :                    False Pass        IX and X :             no hoarseness , gag intact , palate/ uvula rise symmetrically        XI :                       SCM / trapezius strength intact bilateral        XII :                      no tongue deviation       Motor Exam:                Right UE:                     no focal weakness ,  > 3+/5 throughout  , no pronator drift     Left UE:                       no focal weakness ,  > 3+/5 throughout  , no pronator drift         Right LE:          no focal weakness ,  > 3+/5 throughout          Left LE:          no focal weakness ,  > 3+/5 throughout           Sensation :         intact to light touch x 4 extremities                            no neglect or extinction on double simultaneous testing    DTR :           biceps/brachioradialis : equal                            patella/ankle : equal          Coordination :            Finger to Nose :  neg dysmetria bilaterally        Gait :  not tested          PM&R Impression : admitted found conscious on the ground in her apartment, found to be in atrial fibrillation with RVR and meeting SIRS criteria, now admitted to telemetry for work-up of syncope, altered mental status    - no acute pathology on CT imaging     - deconditioned    - no focal neurologic deficits         Recommendations / Plan :       1) Physical / Occupational therapy focusing on therapeutic exercises , equipment evaluation , bed mobility/transfer out of bed evaluation , progressive ambulation with assistive devices prn .    2) Current disposition plan recommendation  :     subacute rehab placement

## 2024-01-25 LAB
ALBUMIN SERPL ELPH-MCNC: 2.2 G/DL — LOW (ref 3.3–5)
ALP SERPL-CCNC: 88 U/L — SIGNIFICANT CHANGE UP (ref 40–120)
ALT FLD-CCNC: 37 U/L — SIGNIFICANT CHANGE UP (ref 10–45)
ANION GAP SERPL CALC-SCNC: 9 MMOL/L — SIGNIFICANT CHANGE UP (ref 5–17)
ANISOCYTOSIS BLD QL: SLIGHT — SIGNIFICANT CHANGE UP
AST SERPL-CCNC: 59 U/L — HIGH (ref 10–40)
BASOPHILS # BLD AUTO: 0 K/UL — SIGNIFICANT CHANGE UP (ref 0–0.2)
BASOPHILS NFR BLD AUTO: 0 % — SIGNIFICANT CHANGE UP (ref 0–2)
BILIRUB SERPL-MCNC: 0.9 MG/DL — SIGNIFICANT CHANGE UP (ref 0.2–1.2)
BUN SERPL-MCNC: 19 MG/DL — SIGNIFICANT CHANGE UP (ref 7–23)
CALCIUM SERPL-MCNC: 8.1 MG/DL — LOW (ref 8.4–10.5)
CHLORIDE SERPL-SCNC: 112 MMOL/L — HIGH (ref 96–108)
CO2 SERPL-SCNC: 22 MMOL/L — SIGNIFICANT CHANGE UP (ref 22–31)
CREAT SERPL-MCNC: 0.72 MG/DL — SIGNIFICANT CHANGE UP (ref 0.5–1.3)
EGFR: 80 ML/MIN/1.73M2 — SIGNIFICANT CHANGE UP
EOSINOPHIL # BLD AUTO: 0.11 K/UL — SIGNIFICANT CHANGE UP (ref 0–0.5)
EOSINOPHIL NFR BLD AUTO: 0.9 % — SIGNIFICANT CHANGE UP (ref 0–6)
GIANT PLATELETS BLD QL SMEAR: PRESENT — SIGNIFICANT CHANGE UP
GLUCOSE SERPL-MCNC: 105 MG/DL — HIGH (ref 70–99)
HCT VFR BLD CALC: 33.3 % — LOW (ref 34.5–45)
HGB BLD-MCNC: 10.7 G/DL — LOW (ref 11.5–15.5)
HYPOCHROMIA BLD QL: SLIGHT — SIGNIFICANT CHANGE UP
LYMPHOCYTES # BLD AUTO: 1.97 K/UL — SIGNIFICANT CHANGE UP (ref 1–3.3)
LYMPHOCYTES # BLD AUTO: 15.5 % — SIGNIFICANT CHANGE UP (ref 13–44)
MACROCYTES BLD QL: SLIGHT — SIGNIFICANT CHANGE UP
MAGNESIUM SERPL-MCNC: 2 MG/DL — SIGNIFICANT CHANGE UP (ref 1.6–2.6)
MANUAL SMEAR VERIFICATION: SIGNIFICANT CHANGE UP
MCHC RBC-ENTMCNC: 32.1 GM/DL — SIGNIFICANT CHANGE UP (ref 32–36)
MCHC RBC-ENTMCNC: 32.2 PG — SIGNIFICANT CHANGE UP (ref 27–34)
MCV RBC AUTO: 100.3 FL — HIGH (ref 80–100)
MICROCYTES BLD QL: SLIGHT — SIGNIFICANT CHANGE UP
MONOCYTES # BLD AUTO: 0.43 K/UL — SIGNIFICANT CHANGE UP (ref 0–0.9)
MONOCYTES NFR BLD AUTO: 3.4 % — SIGNIFICANT CHANGE UP (ref 2–14)
NEUTROPHILS # BLD AUTO: 10.19 K/UL — HIGH (ref 1.8–7.4)
NEUTROPHILS NFR BLD AUTO: 80.2 % — HIGH (ref 43–77)
OVALOCYTES BLD QL SMEAR: SLIGHT — SIGNIFICANT CHANGE UP
PHOSPHATE SERPL-MCNC: 2 MG/DL — LOW (ref 2.5–4.5)
PLAT MORPH BLD: ABNORMAL
PLATELET # BLD AUTO: 135 K/UL — LOW (ref 150–400)
POIKILOCYTOSIS BLD QL AUTO: SLIGHT — SIGNIFICANT CHANGE UP
POLYCHROMASIA BLD QL SMEAR: SLIGHT — SIGNIFICANT CHANGE UP
POTASSIUM SERPL-MCNC: 3.4 MMOL/L — LOW (ref 3.5–5.3)
POTASSIUM SERPL-SCNC: 3.4 MMOL/L — LOW (ref 3.5–5.3)
PROT SERPL-MCNC: 5.2 G/DL — LOW (ref 6–8.3)
RBC # BLD: 3.32 M/UL — LOW (ref 3.8–5.2)
RBC # FLD: 16.9 % — HIGH (ref 10.3–14.5)
RBC BLD AUTO: ABNORMAL
SODIUM SERPL-SCNC: 143 MMOL/L — SIGNIFICANT CHANGE UP (ref 135–145)
WBC # BLD: 12.7 K/UL — HIGH (ref 3.8–10.5)
WBC # FLD AUTO: 12.7 K/UL — HIGH (ref 3.8–10.5)

## 2024-01-25 PROCEDURE — 99233 SBSQ HOSP IP/OBS HIGH 50: CPT | Mod: GC

## 2024-01-25 PROCEDURE — 95720 EEG PHY/QHP EA INCR W/VEEG: CPT

## 2024-01-25 PROCEDURE — 99232 SBSQ HOSP IP/OBS MODERATE 35: CPT | Mod: GC

## 2024-01-25 PROCEDURE — 99232 SBSQ HOSP IP/OBS MODERATE 35: CPT

## 2024-01-25 RX ORDER — POTASSIUM CHLORIDE 20 MEQ
40 PACKET (EA) ORAL ONCE
Refills: 0 | Status: COMPLETED | OUTPATIENT
Start: 2024-01-25 | End: 2024-01-25

## 2024-01-25 RX ORDER — METOPROLOL TARTRATE 50 MG
25 TABLET ORAL EVERY 8 HOURS
Refills: 0 | Status: DISCONTINUED | OUTPATIENT
Start: 2024-01-25 | End: 2024-01-28

## 2024-01-25 RX ORDER — HEPARIN SODIUM 5000 [USP'U]/ML
5000 INJECTION INTRAVENOUS; SUBCUTANEOUS EVERY 12 HOURS
Refills: 0 | Status: DISCONTINUED | OUTPATIENT
Start: 2024-01-25 | End: 2024-01-25

## 2024-01-25 RX ORDER — PANTOPRAZOLE SODIUM 20 MG/1
40 TABLET, DELAYED RELEASE ORAL EVERY 12 HOURS
Refills: 0 | Status: DISCONTINUED | OUTPATIENT
Start: 2024-01-25 | End: 2024-01-30

## 2024-01-25 RX ADMIN — Medication 25 MILLIGRAM(S): at 06:01

## 2024-01-25 RX ADMIN — PIPERACILLIN AND TAZOBACTAM 25 GRAM(S): 4; .5 INJECTION, POWDER, LYOPHILIZED, FOR SOLUTION INTRAVENOUS at 14:15

## 2024-01-25 RX ADMIN — Medication 85 MILLIMOLE(S): at 15:17

## 2024-01-25 RX ADMIN — LATANOPROST 1 DROP(S): 0.05 SOLUTION/ DROPS OPHTHALMIC; TOPICAL at 21:23

## 2024-01-25 RX ADMIN — Medication 25 MILLIGRAM(S): at 21:23

## 2024-01-25 RX ADMIN — Medication 40 MILLIEQUIVALENT(S): at 14:14

## 2024-01-25 RX ADMIN — Medication 25 MILLIGRAM(S): at 12:08

## 2024-01-25 RX ADMIN — HEPARIN SODIUM 5000 UNIT(S): 5000 INJECTION INTRAVENOUS; SUBCUTANEOUS at 11:17

## 2024-01-25 RX ADMIN — PIPERACILLIN AND TAZOBACTAM 25 GRAM(S): 4; .5 INJECTION, POWDER, LYOPHILIZED, FOR SOLUTION INTRAVENOUS at 21:23

## 2024-01-25 RX ADMIN — PANTOPRAZOLE SODIUM 40 MILLIGRAM(S): 20 TABLET, DELAYED RELEASE ORAL at 00:21

## 2024-01-25 RX ADMIN — Medication 88 MICROGRAM(S): at 06:01

## 2024-01-25 RX ADMIN — PIPERACILLIN AND TAZOBACTAM 25 GRAM(S): 4; .5 INJECTION, POWDER, LYOPHILIZED, FOR SOLUTION INTRAVENOUS at 06:01

## 2024-01-25 RX ADMIN — PANTOPRAZOLE SODIUM 40 MILLIGRAM(S): 20 TABLET, DELAYED RELEASE ORAL at 11:18

## 2024-01-25 RX ADMIN — PANTOPRAZOLE SODIUM 40 MILLIGRAM(S): 20 TABLET, DELAYED RELEASE ORAL at 22:10

## 2024-01-25 NOTE — PROGRESS NOTE ADULT - PROBLEM SELECTOR PLAN 5
Presented s/p fall. Pt with no recollection of event (though denies falls) but multiple sites of ecchymosis suggests fall.   Etiology unclear: suspect mechanical (general deconditioning) vs orthostatic vs. possible cardiogenic run of afib (seen while on tele).  - f/u orthostatics when reconditioned  - PT -> Rec STEFANO    #Elevated CK - IMPROVING.   Likely 2/2 fall and immobility after. Peaked and downtrending s/p IVF. Presented w/ 2/4 SIRS (HR, WBC) w/ elevated lactate. Lactate resolved s/p IVF. No clear infectious source.   COVID/Influenza/RSV and UAnegative.   CTPE, CTAP unremarkable; Procal 0.58; DDx: Reactive iso fall/found down for multiple days vs. infectious  - zosyn 4.5 empiric given AMS. -> complete 7 day course 1/22-1/29    #possible MM  pt is currently being worked up for MM per outpt.

## 2024-01-25 NOTE — PROGRESS NOTE ADULT - PROBLEM SELECTOR PLAN 4
Presented with ventricular ectopy. No previous cardiac history. Afib initially ruled outed, however run of afib 1/24 AM which returned to NSR.   Likely 2/2 electrolyte abnormalities versus contributions from afib as per above .   TTE 1/23: normal b/l ventricular systolic function. mild MR.   - cardiology following   - per cards, w/ ectopy burden and new run of afib, increase metoprolol to 25mg tid w/ holding parameters      #Elevated Troponin - RESOLVED  Troponin elevated, plateauing. EKG non-ischemic changes.  Likely 2/2 Demand ischemia in the setting of hypoperfusion/sepsis Presented s/p fall. Pt with no recollection of event (though denies falls) but multiple sites of ecchymosis suggests fall.   Etiology unclear: suspect mechanical (general deconditioning) vs orthostatic vs. possible cardiogenic run of afib (seen while on tele).  - f/u orthostatics when reconditioned  - PT -> Rec STEFANO

## 2024-01-25 NOTE — PROGRESS NOTE ADULT - ATTENDING COMMENTS
plas edited as above   can do trial of AC W heparin drip in am with serial CBC q6h to monitor  serial exam for melena last episode over night   if cant do AC consider IVC filter   GI following for now plan for outpt EGD/scope - reconsult if needed   ENT oupt follow up for dysphagia   Dispo PT: rec'd for STEFANO plas edited as above   can do trial of AC W heparin drip in am with serial CBC q6h to monitor  serial exam for melena last episode over night   if cant do AC consider IVC filter   GI following for now plan for outpt EGD/scope - reconsult if needed   ENT oupt follow up for dysphagia   Dispo PT: rec'd for STEFANO  GOC: DNR/DNI

## 2024-01-25 NOTE — PROGRESS NOTE ADULT - SUBJECTIVE AND OBJECTIVE BOX
INTERVAL EVENTS: Currently on PPI BID for possible GI bleed. No other events     PAST MEDICAL & SURGICAL HISTORY:  Diverticulitis    Osteoporosis    Hypothyroidism    HTN (hypertension)    Conway's esophagus    Depression    Glaucoma    History of cataract surgery, right        MEDICATIONS  (STANDING):  latanoprost 0.005% Ophthalmic Solution 1 Drop(s) Both EYES at bedtime  levothyroxine 88 MICROGram(s) Oral daily  metoprolol tartrate 25 milliGRAM(s) Oral every 12 hours  pantoprazole  Injectable 40 milliGRAM(s) IV Push every 12 hours  piperacillin/tazobactam IVPB.. 4.5 Gram(s) IV Intermittent every 8 hours    MEDICATIONS  (PRN):  acetaminophen     Tablet .. 650 milliGRAM(s) Oral every 6 hours PRN Temp greater or equal to 38C (100.4F), Mild Pain (1 - 3)  aluminum hydroxide/magnesium hydroxide/simethicone Suspension 30 milliLiter(s) Oral every 4 hours PRN Dyspepsia  melatonin 3 milliGRAM(s) Oral at bedtime PRN Insomnia  ondansetron Injectable 4 milliGRAM(s) IV Push every 8 hours PRN Nausea and/or Vomiting    Vital Signs Last 24 Hrs  T(C): 37.8 (25 Jan 2024 05:25), Max: 37.8 (25 Jan 2024 05:25)  T(F): 100 (25 Jan 2024 05:25), Max: 100 (25 Jan 2024 05:25)  HR: 64 (25 Jan 2024 08:30) (64 - 94)  BP: 144/70 (25 Jan 2024 08:30) (110/60 - 148/69)  BP(mean): 100 (25 Jan 2024 08:30) (80 - 102)  RR: 16 (25 Jan 2024 08:30) (16 - 17)  SpO2: 95% (25 Jan 2024 08:30) (93% - 98%)    Parameters below as of 25 Jan 2024 08:30  Patient On (Oxygen Delivery Method): room air        PHYSICAL EXAM:  GEN: Awake, comfortable. NAD.   HEENT: NCAT, PERRL, EOMI. Mucosa moist. No JVD.   RESP: CTA b/l  CV: RRR, normal s1/s2. systolic murmur   ABD: Soft, NTND. BS+  EXT: Warm. No edema, clubbing, or cyanosis.     LABS:                        10.7   12.70 )-----------( 135      ( 25 Jan 2024 05:30 )             33.3     01-25    143  |  112<H>  |  19  ----------------------------<  105<H>  3.4<L>   |  22  |  0.72    Ca    8.1<L>      25 Jan 2024 05:30  Phos  2.0     01-25  Mg     2.0     01-25    TPro  5.2<L>  /  Alb  2.2<L>  /  TBili  0.9  /  DBili  x   /  AST  59<H>  /  ALT  37  /  AlkPhos  88  01-25          Urinalysis Basic - ( 25 Jan 2024 05:30 )    Color: x / Appearance: x / SG: x / pH: x  Gluc: 105 mg/dL / Ketone: x  / Bili: x / Urobili: x   Blood: x / Protein: x / Nitrite: x   Leuk Esterase: x / RBC: x / WBC x   Sq Epi: x / Non Sq Epi: x / Bacteria: x      I&O's Summary    24 Jan 2024 07:01  -  25 Jan 2024 07:00  --------------------------------------------------------  IN: 700 mL / OUT: 900 mL / NET: -200 mL      RADIOLOGY & ADDITIONAL STUDIES:  TELEMETRY:  EKG:

## 2024-01-25 NOTE — EEG REPORT - NS EEG TEXT BOX
[facility] Department of Neurology  Inpatient Continuous video-Electroencephalogram  [address], [address] [place] [id number], T: [phone]    Patient Name:	ROSE HINES    :	1935  MRN:	[id number]    Study Start Date/Time:	2024, 3:23:59 PM  Study End Date/Time:    Referred by:  Anisha Reich MD    Brief Clinical History:  ROSE HINES is a 88 year old Female found down and unconscious but now awake; study performed to investigate for seizures or markers of epilepsy.   Technologist notes: -  Diagnosis Code:  R56.9 convulsions/seizure    Pertinent Medication:  n/a    Acquisition Details:  Electroencephalography was acquired using a minimum of 21 channels on an dot[employer] Neurology system v 9.3.1 with electrode placement according to the standard International 10-20 system following ACNS (American Clinical Neurophysiology Society) guidelines.  Anterior temporal T1 and T2 electrodes were utilized whenever possible.  The XLTEK automated spike & seizure detections were all reviewed in detail, in addition to the entire raw EEG.    Findings:  Day 1:  2024, 3:23:59 PM to next morning at 07:00 AM   Background:  continuous, with predominantly alpha > theta frequencies.  Generalized Slowing:  Intermittent semi-rhythmic and irregular sharply contoured theta/delta during wakeful periods  Symmetry/Focality: No persistent asymmetries of voltage or frequency.     Voltage:  Normal (20+ uV)  Organization:  Appropriate anterior-posterior gradient  Posterior Dominant Rhythm:  9 Hz symmetric, well-organized, and well-modulated  Sleep:  Symmetric, synchronous spindles and K complexes.  Variability:   Yes		Reactivity:  Yes    Spontaneous Activity:  No definite epileptiform discharges, but occasional sharply contoured activity embedded within irregular theta/delta.   Events:  "	No electrographic seizures or significant clinical events occurred during this study.  Provocations:  "	Hyperventilation: was not performed.  "	Photic stimulation: was not performed.  Daily Summary:    "	There was mild excess intermittent slow wave activity, sometimes sharply contoured which may suggest cortical irritability, but not morphologically not having enough characteristics to declare an epileptogenic focus      Bronson Vasquez MD  Attending Neurologist, [facility]

## 2024-01-25 NOTE — PROGRESS NOTE ADULT - ATTENDING COMMENTS
Patient seen, examined, and discussed with Dr. Gamble. Agree with above. 88F with a h/o glaucoma (Latanoprost), hypothyroidism, HTN, worked up MM, presented to ED due to being found conscious on the ground in her apartment, now admitted to telemetry for work-up of syncope, altered mental status, and further monitoring. Course complicated by 4-5 episodes of melena on 01/24/24, so GI consulted for UGIB. Slight decrease in Hgb appears dilutional, no output. Continue PPI, will consider EGD non-urgently.     Remy Winters MD  Gastroenterology

## 2024-01-25 NOTE — PROGRESS NOTE ADULT - ASSESSMENT
89yo F with PMHx of Glaucoma, Hypothyroidism, HTN who presented from [facility] due to being found conscious on the ground in her apartment after a fall, found to meet SIRS criteria (sepsis vs. reactive) and ectopy, now admitted to telemetry for work-up of syncope, altered mental status, and further monitoring now stable for stepdown to F.

## 2024-01-25 NOTE — PROGRESS NOTE ADULT - PROBLEM SELECTOR PLAN 5
Presented s/p fall. Pt with no recollection of event (though denies falls) but multiple sites of ecchymosis suggests fall.   Etiology unclear: suspect mechanical (general deconditioning) vs orthostatic vs. possible cardiogenic run of afib (seen while on tele).  - f/u orthostatics when reconditioned  - PT -> Rec STEFANO  - work up/manage afib as per below    #Elevated CK - IMPROVING.   Likely 2/2 fall and immobility after. Peaked and downtrending s/p IVF.

## 2024-01-25 NOTE — PROGRESS NOTE ADULT - PROBLEM SELECTOR PLAN 1
No known history of Afib, HR 84. Not on on home medications as patient unaware of afib/never has been told she has afib.   It is possible patient had this prior to admission and has paroxysmal afib, versus potential sickness triggering the afib.   - CHADSVASC 3  - HASBLED 1  - Cardiology following, f/u their recs  - per cardiology, will consider trialing heparin/lovenox given MGUS/MM history, however will hold off on this at this time given concern for GI bleed  - c/w metoprolol 25mg q8 for rate control  - continued discussion regarding AC versus holding off on AC given bleeding risk  - Goal HR <110 [RACE II trial]  - c heparin 5000u subq q12hr

## 2024-01-25 NOTE — PROGRESS NOTE ADULT - SUBJECTIVE AND OBJECTIVE BOX
*** STEPDPOWN TELE -> [facility] ***    Hospital Course  Ms. Overton is an 87YO F with PMH of Glaucoma (Latanoprost), Hypothyroidism, HTN, worked up MM who presented from [facility] due to being found conscious on the ground in her apartment, found to be in Atrial fibrillation with RVR and meeting severe sepsis criteria with a lactate of 2.5. Patient was admitted to telemetry for work-up of syncope, altered mental status, and further monitoring. The lactate quickly cleared and cardiology was consulted given her runs of afib with RVR, for which she received lopressor, after which she was transitioned to metoprolol tartrate without further runs of afib with RVR. The patient was initially hypernatremic for which she was treated with D5 with subsequent resolution. Her course was complicated by two episodes of melena for which GI was consulted who recommended a scope as an outpatient, and her hemoglobin remained stable without further occurrences of melena. The patient was placed on vEEG to rule out a seizure contributing though there is low suspicion for that at this time. The patient was deemed stable for stepdown to [facility].     OVERNIGHT EVENTS:   - 6pm bladder scan 263,  BS 12aM 398 -> straight cath   - no BMs overnight  - no further RVR overnight     SUBJECTIVE:   - Patient seen and examined at bedside, comfortable, NAD. Denied fever, chest pain, dyspnea, abdominal pain.     Vital Signs Last 12 Hrs  T(F): 98 (01-25-24 @ 10:00), Max: 100 (01-25-24 @ 05:25)  HR: 76 (01-25-24 @ 12:10) (64 - 76)  BP: 141/63 (01-25-24 @ 12:10) (141/63 - 144/70)  BP(mean): 91 (01-25-24 @ 12:10) (91 - 100)  RR: 16 (01-25-24 @ 12:10) (16 - 16)  SpO2: 93% (01-25-24 @ 12:10) (93% - 95%)    I&O's Summary  24 Jan 2024 07:01  -  25 Jan 2024 07:00  --------------------------------------------------------  IN: 700 mL / OUT: 900 mL / NET: -200 mL    25 Jan 2024 07:01  -  25 Jan 2024 13:29  --------------------------------------------------------  IN: 0 mL / OUT: 100 mL / NET: -100 mL    PHYSICAL EXAM:  General: NAD  HEENT: NC/AT; PERRL; Neck Supple; dry MM, R sided tongue, mild blood on the lip. Voice is not at baseline and more hoarse per family   Respiratory: CTAB; no wheezes/rales/rhonchi, normal WOB  Cardiovascular: Regular rhythm/rate, + S1/S2; no murmurs, rubs gallops   Gastrointestinal: Soft; NTND; bowel sounds hyperactive BS four quads  : no suprapubic tenderness. + primafit  Vascular: extremities WWP; no edema/cyanosis  Neurological: A&Ox3, no obvious focal deficits  Integument: Extensive ecchymosis with various stages of healing on buttocks, trunk, dorsal and ventral aspects of UE and LE B/L. Stage I Pressure ulcers on L and R buttocks and mid-thoracic region of trunk. +Swelling and erythema of L hand.    LABS:                        10.7   12.70 )-----------( 135      ( 25 Jan 2024 05:30 )             33.3     01-25    143  |  112<H>  |  19  ----------------------------<  105<H>  3.4<L>   |  22  |  0.72    Ca    8.1<L>      25 Jan 2024 05:30  Phos  2.0     01-25  Mg     2.0     01-25    TPro  5.2<L>  /  Alb  2.2<L>  /  TBili  0.9  /  DBili  x   /  AST  59<H>  /  ALT  37  /  AlkPhos  88  01-25      Urinalysis Basic - ( 25 Jan 2024 05:30 )    Color: x / Appearance: x / SG: x / pH: x  Gluc: 105 mg/dL / Ketone: x  / Bili: x / Urobili: x   Blood: x / Protein: x / Nitrite: x   Leuk Esterase: x / RBC: x / WBC x   Sq Epi: x / Non Sq Epi: x / Bacteria: x    RADIOLOGY & ADDITIONAL TESTS:  - reviewed     MEDICATIONS  (STANDING):  heparin   Injectable 5000 Unit(s) SubCutaneous every 12 hours  latanoprost 0.005% Ophthalmic Solution 1 Drop(s) Both EYES at bedtime  levothyroxine 88 MICROGram(s) Oral daily  metoprolol tartrate 25 milliGRAM(s) Oral every 8 hours  pantoprazole    Tablet 40 milliGRAM(s) Oral every 12 hours  piperacillin/tazobactam IVPB.. 4.5 Gram(s) IV Intermittent every 8 hours    MEDICATIONS  (PRN):  acetaminophen     Tablet .. 650 milliGRAM(s) Oral every 6 hours PRN Temp greater or equal to 38C (100.4F), Mild Pain (1 - 3)  aluminum hydroxide/magnesium hydroxide/simethicone Suspension 30 milliLiter(s) Oral every 4 hours PRN Dyspepsia  melatonin 3 milliGRAM(s) Oral at bedtime PRN Insomnia  ondansetron Injectable 4 milliGRAM(s) IV Push every 8 hours PRN Nausea and/or Vomiting

## 2024-01-25 NOTE — PROGRESS NOTE ADULT - PROBLEM SELECTOR PLAN 7
Resolved.   P/w hypernatremia 149 s/p fall and poor PO intake x 2 days. Most likely 2/2 hypovolemia, evident w/ concentrated urine on exam.     #HAGMA - RESOLVED  #elevated serum lactate - RESOLVED  POA w/ HAGMA w/ most likely cause to be lactic acidosis. resolved w/ D5 w/ 1AMP bicarb P/w AMS. Per family bedside voice is more hoarse than baseline. Failed dysphagia testing this admission.    SS consulted. FEES 1/23 showed severe pharyngeal dysphagia. However vocal ford found to be inadequately closing.  - aspiration precautions  - soft, bite sized diet w/ thin liquid per SS recs  - ENT recommending outpatient follow up and continue working w/ SLP  - no straws at bedside to prevent aspiration

## 2024-01-25 NOTE — PROGRESS NOTE ADULT - PROBLEM SELECTOR PLAN 2
Alerted that pt had two episodes of dark stool on 1/24, assessed, appears dark and possibly melena.   Fecal occult blood positive.   Repeat CBC showed Hgb 11.8->12.0.   No known history of GI bleeds, patient has had a scope before (unsure exactly when) but was told it was normal.  - c/w PO [id number] mg BID  - monitor for further melena appearing stools  - GI consult for c/f gi bleed; Will consider EGD once patient is no longer on vEEG   - hold off on AC at this time.

## 2024-01-25 NOTE — PROGRESS NOTE ADULT - PROBLEM SELECTOR PLAN 2
#r/o Melena  Alerted that pt had an episode of dark stool on 1/24 9am, assessed, appears dark and possibly melena.   Fecal occult blood positive. Patient had a repeat dark melena appearing stool later in AM.  Repeat CBC showed Hgb 11.8->12.0. Hgb now stable  No known history of GI bleeds, patient has had a scope before (unsure exactly when) but was told it was normal.  - trend cbc  - PPI bid, pantoprazole 40mg -> transitioned to oral  - monitor for further melena appearing stools  - GI consult for c/f gi bleed -> - will consider EGD once patient is no longer on vEEG   - hold off on AC at this time.

## 2024-01-25 NOTE — PROGRESS NOTE ADULT - PROBLEM SELECTOR PLAN 3
Improving. On admission, patient was altered. UTox, salicylate, acetaminophen neg.   CTH: neg for acute pathology. neurological causes unlikely.   - vEEG r/o seizure, still pending  - Hypernatremia resolved  - empiric Abx: Zosyn 4.5 q8, 7 day course 1/22-1/29

## 2024-01-25 NOTE — PROGRESS NOTE ADULT - SUBJECTIVE AND OBJECTIVE BOX
Physical Medicine and Rehabilitation Progress Note :       Patient is a 88y old  Female who presents with a chief complaint of syncope/fall (25 Jan 2024 09:53)      HPI:  87YO F with PMHx of Glaucoma (Latanoprost), Hypothyroidism, HTN presents from [facility] due to being found conscious on the ground in her apartment. Per [facility] ED note, patient's building superintendant was concerned when patient did not  newspaper for 2 days and found patient lying awake on the floor and weak and thereafter called EMS. Patient lives alone, with no HHA, and uses cane to walk when outside but no cane/walker inside her home. Per patient, she does not recall what happened (lost consciousness), previously felt confused, but vehemently denies a fall. Her last recollection involves visiting her niece, Alba, in [place] and sitting in her living room to read. She denies any prior history of falls. She denies loss of consciousness, and remained on the floor for ~2 days.   She drinks approximately 1 oz glass of vodka once weekly, with her last drink on Friday.       Of note, upon arrival to [facility], extensive bruising/echymoses were apparent of various stages of healing dispersed throughout her buttocks, back, arms, and legs. When inquiring regarding her feeling safe, she reports yes and denies any concerns or fears of harm to her by others. She reports not having much support, as her brother passed away last year and most of her loved ones have passed away.     Otherwise, she denies any chest pain, abdominal pain, N/V/D, fever, fatigue, chills, or other pertinent Sx on ROS.     ED COURSE  ·	VITALS: T 95.6 F, /75, HR 90, RR 18, SpO2 97% on RA.  ·	LABS: WBC 28.09 (high), %Neutrophils 89.7 (high), %Lymphocytes 3.1, D-dimer 1148 (high), Troponin I 212.6 (high), Na 149 (high), Cl 114 (low), HCO3 19 (low), BUN 51 (high), Albumin 2.7 (low), Alkaline Phosphatase 129 (high),  (high), ALT 56 (high), CK 4434 (high), Pro-BNP 2629 (high), Lactate 3.7 (high). COVID/Influenza/RSV negative.  ·	IMAGING: CTPE negative. CT Hip reveals Degenerative changes of the lumbar spine and pelvis with no fractures. CT C/A/P No Contrast No definite evidence of solid visceral injury in the chest, abdomen or pelvis within the limits of this noncontrast study. CT Head and Cervical spine No Cont negative.  ·	INTERVENTIONS: Aspirin 324mg x1, Levaquin 750mg x1, 2L NS Bolus, IV Ativan 0.5mg x1. 2L NS Bolus.   (23 Jan 2024 04:52)                            10.7   12.70 )-----------( 135      ( 25 Jan 2024 05:30 )             33.3       01-25    143  |  112<H>  |  19  ----------------------------<  105<H>  3.4<L>   |  22  |  0.72    Ca    8.1<L>      25 Jan 2024 05:30  Phos  2.0     01-25  Mg     2.0     01-25    TPro  5.2<L>  /  Alb  2.2<L>  /  TBili  0.9  /  DBili  x   /  AST  59<H>  /  ALT  37  /  AlkPhos  88  01-25    Vital Signs Last 24 Hrs  T(C): 36.7 (25 Jan 2024 10:00), Max: 37.8 (25 Jan 2024 05:25)  T(F): 98 (25 Jan 2024 10:00), Max: 100 (25 Jan 2024 05:25)  HR: 64 (25 Jan 2024 08:30) (64 - 94)  BP: 144/70 (25 Jan 2024 08:30) (110/60 - 148/69)  BP(mean): 100 (25 Jan 2024 08:30) (80 - 102)  RR: 16 (25 Jan 2024 08:30) (16 - 17)  SpO2: 95% (25 Jan 2024 08:30) (93% - 98%)    Parameters below as of 25 Jan 2024 08:30  Patient On (Oxygen Delivery Method): room air        MEDICATIONS  (STANDING):  heparin   Injectable 5000 Unit(s) SubCutaneous every 12 hours  latanoprost 0.005% Ophthalmic Solution 1 Drop(s) Both EYES at bedtime  levothyroxine 88 MICROGram(s) Oral daily  metoprolol tartrate 25 milliGRAM(s) Oral every 8 hours  pantoprazole    Tablet 40 milliGRAM(s) Oral every 12 hours  piperacillin/tazobactam IVPB.. 4.5 Gram(s) IV Intermittent every 8 hours    MEDICATIONS  (PRN):  acetaminophen     Tablet .. 650 milliGRAM(s) Oral every 6 hours PRN Temp greater or equal to 38C (100.4F), Mild Pain (1 - 3)  aluminum hydroxide/magnesium hydroxide/simethicone Suspension 30 milliLiter(s) Oral every 4 hours PRN Dyspepsia  melatonin 3 milliGRAM(s) Oral at bedtime PRN Insomnia  ondansetron Injectable 4 milliGRAM(s) IV Push every 8 hours PRN Nausea and/or Vomiting          Initial Functional Status Assessment :       Previous Level of Function:     · Ambulation Skills	independent; SC  · Transfer Skills	independent  · ADL Skills	independent  · Additional Comments	Pt. lives alone, elevator access, Ambulates with SC outdoors and no device indoors.    Cognitive Status Examination:   · Orientation	oriented to person, place, time and situation  · Level of Consciousness	alert; confused  · Follows Commands and Answers Questions	able to follow single-step instructions; 100% of the time; hard of hearing  · Personal Safety and Judgment	intact    Range of Motion Exam:   · Range of Motion Examination	bilateral upper extremity ROM was WFL (within functional limits); bilateral lower extremity ROM was WFL (within functional limits)    Manual Muscle Testing:   · Manual Muscle Testing Results	~ 3/5 t/o by functional assessment against gravity    Bed Mobility: Rolling/Turning:     · Level of [place]	moderate assist (50% patients effort)  · Physical Assist/Nonphysical Assist	1 person assist  · Assistive Device	bed rails    Bed Mobility: Scooting/Bridging:     · Level of [place]	maximum assist (25% patients effort)  · Physical Assist/Nonphysical Assist	2 person assist    Bed Mobility: Sit to Supine:     · Level of [place]	moderate assist (50% patients effort)  · Physical Assist/Nonphysical Assist	2 person assist    Bed Mobility: Supine to Sit:     · Level of [place]	maximum assist (25% patients effort)  · Physical Assist/Nonphysical Assist	2 person assist    Transfer: Sit to Stand:     · Level of [place]	moderate assist (50% patients effort)  · Physical Assist/Nonphysical Assist	2 person assist  · Weight-Bearing Restrictions	weight-bearing as tolerated  · Assistive Device	rolling walker    Transfer: Stand to Sit:     · Level of [place]	moderate assist (50% patients effort)  · Physical Assist/Nonphysical Assist	2 person assist  · Weight-Bearing Restrictions	weight-bearing as tolerated  · Assistive Device	rolling walker    Sit/Stand Transfer Safety Analysis:     · Transfer Safety Concerns Noted	decreased weight-shifting ability  · Impairments Contributing to Impaired Transfers	impaired balance    Gait Skills:     · Level of [place]	moderate assist (50% patients effort)  · Physical Assist/Nonphysical Assist	2 person assist  · Weight-Bearing Restrictions	weight-bearing as tolerated  · Assistive Device	rolling walker  · Gait Distance	5 side steps    Gait Analysis:     · Gait Deviations Noted	decreased yoel; decreased weight-shifting ability  · Impairments Contributing to Gait Deviations	impaired balance; decreased strength    Balance Skills Assessment:     · Sitting Balance: Static	good balance  · Sitting Balance: Dynamic	good balance  · Sit-to-Stand Balance	fair minus  · Standing Balance: Static	fair minus  · Standing Balance: Dynamic	poor balance  · Identified Impairments Contributing to Balance Disturbance	decreased strength    Sensory Examination:   Sensory Examination:    Grossly Intact:   · Gross Sensory Examination	Grossly Intact; to light touch      Treatment Location:   · Comments	Pt. currently presenting with limited endurance, impaired standing balance, decreased strength; increased unsteadiness of gait. Gait training was further limited by pt's anxiety and fear of falling.    Clinical Impressions:   · Functional Limitations in Following Categories (describe specific limitations)	self-care; home management  · Risk Reduction/Prevention (Describe Specific Areas of risk reduction/prevention)	risk factors          PM&R Impression : as above    Current disposition plan recommendation :    subacute rehab placement

## 2024-01-25 NOTE — PROGRESS NOTE ADULT - ATTENDING COMMENTS
Patient is an 87 yo Female with PMHx of  PAD, HTN, HLD,  Hypothyroidism, monoclonal gammopathy being evaluated for MM, who presented to [facility] after being found on the ground in her apartment without loss of consciousness,  found to be in rhabdomyolysis, with AMS and electrolyte derangements with ventricular bigeminy and subsequentlt Afib. Cardiology consulted for Ectopy    REVIEW OF STUDIES:   - ECG 01/23/2024: Sinus tachycardia with bigeminy. Normal axis. No ischemic changes  - Tele 01/22-23/2024: Ventricular bigeminy and frequent couplets and PVCs  - TTE 1/23/2024: Normal LV function, no high gradients across LVOT ( 12mmHg) suggestive of LVOT obstruction, Grade 1 DD, mild MR, trace TR, PASP 32mmHg     # Ventricular Ectopy; pAfib  - Patient with no known CVD, followed by outpatient Cardiologist at [facility], Dr Clifton Elena, admitted with Rhabdomyolysis after sustaining fall with  prolonged immobilization, clinically improved  - Patient is not clear as to how she fell but denies anginal symptoms since.   - EKGs on admission reviewed showing NSR with frequent PVCS without ischemic changes  - Tele reviewed showing improved ectopy burden overall, PVC burdern continues to improve. Brief episode of Afib on 1/25 early am with spontaneous return to NSR  - ECHO this hospitalization reviewed showing normal Biv function without RWMA and intracavitary gradient of 12 mmhG  - At this time Given ectopy burden, would recommend increasing Lopressor to 25 mg po TID with holding parameters. Should patients' SBP remains persistently 140/90 and above, would swicth to Coreg 6.25 mg po BID with goal to uptitrate as tolerated for better BP and rate control  - Patient with CHADVASC of at least 5 placing her at higher thromboembolic risk. However patient with melena and pos FOBT on 1/24. After conversation with patient and family decision made to withhold from starting AC  - Would continue to Keep K > 4 and Magnesium > 2  - Given Intracavitary gradient additionally would avoid diuretics and maintain patient euvolemic  - Cardiology will cont to follow along with you, please call with any questions .

## 2024-01-25 NOTE — PROGRESS NOTE ADULT - ASSESSMENT
I M    88 y o F with PMHx of Glaucoma, Hypothyroidism, HTN who presented from [facility] due to being found conscious on the ground in her apartment after a fall, found to meet SIRS criteria (sepsis vs. reactive) and ectopy, now admitted to telemetry for work-up of syncope, altered mental status, and further monitoring.    Problem/Plan - 1:  ·  Problem: Metabolic encephalopathy.   ·  Plan: Improving.   Presented with AMS. Found down after 2 days after likely fall at home. UTox, salicylate, acetaminophen neg.   However c/o R lateral tongue pain.   CTH: neg for acute pathology. neurological causes unlikely.   DDx: infectious (meets severe sepsis criteria as below) vs. Uremia (elevated BUN) vs. Metabolic hypothyroidism vs electrolyte derangements vs. seizure.   - vEEG r/o seizure, still pending  - correct electrolytes, K repleted overnight. HyperNa as per below.   - empiric Abx: c Zosyn 4.5 q8.    Problem/Plan - 2:  ·  Problem: Fall.   ·  Plan: Presented s/p fall. Pt with no recollection of event (though denies falls) but multiple sites of ecchymosis suggests fall.   Etiology unclear: suspect mechanical (general deconditioning) vs orthostatic vs. possible cardiogenic run of afib (seen while on tele).  - f/u orthostatics when reconditioned  - PT -> Rec STEFANO  - Telemetry monitoring w/ EKGs  - work up/manage afib as per below    #Elevated CK - IMPROVING.   Likely 2/2 fall and immobility after. Peaked and downtrending s/p IVF.    Problem/Plan - 3:  ·  Problem: Severe sepsis.   ·  Plan: Presented w/ 2/4 SIRS (HR, WBC) w/ elevated lactate. Lactate resolved s/p IVF. No clear infectious source.   COVID/Influenza/RSV negative. UA shows few bacteria but WBC <10, neg LE/ nitrate. Leukocytosis downtrending (although elevated WBC can also be confounded by possible Dx of MM). s/p CTX  CTPE, CTAP unremarkable.  Procal 0.58.   DDx: Reactive iso fall/found down for multiple days vs. infectious  - zosyn 4.5 empiric given AMS.   - f/u UCx, BCx   - labs, VS monitoring    #possible MM  pt is currently being worked up for MM per outpt.    - obtain collateral outpt cardiologist/ PCP Dr. Surendra Blanchard.    Problem/Plan - 4:  ·  Problem: Hypernatremia.   ·  Plan: P/w hypernatremia 149 s/p fall and poor PO intake x 2 days. Most likely 2/2 hypovolemia, evident w/ concentrated urine on exam. 2.1L Free water deficit. on 1L D5 w/ 1AMP bicarb @ 80cc/hr.   - correct free water deficit  - stop the bicarb in D5, increase D5 rate to 100c/hr  - cautions with overcorrection (goal <0.5meq /hr)   - CMPs    #HAGMA - RESOLVED  #elevated serum lactate - RESOLVED  POA w/ HAGMA w/ most likely cause to be lactic acidosis. resolved w/ D5 w/ 1AMP bicarb.    Problem/Plan - 5:  ·  Problem: Atrial fibrillation.   ·  Plan: No known history of Afib, HR 84. Not on on home medications as patient unaware of afib/never has been told she has afib.   It is possible patient had this prior to admission and has paroxysmal afib, versus potential sickness triggering the afib.   - CHADSVASC 3  - HASBLED 1  - hold heparing / Eliquis anticoagulation at this time given concern for GI bleed  - Cardiology following, f/u their recs  - per cardiology, will consider trialing heparin/lovenox given MGUS/MM history, however will hold off on this at this time given concern for GI bleed  - c/w metoprolol 25mg q12 for rate control  - continued discussion regarding AC versus holding off on AC given bleeding risk  - Goal HR <110 [RACE II trial].    Problem/Plan - 6:  ·  Problem: Ventricular ectopy.   ·  Plan: Presented with ventricular ectopy. No previous cardiac history. Afib initially ruled outed, however run of afib 1/24 AM which returned to NSR.   Likely 2/2 electrolyte abnormalities versus contributions from afib as per above .   TTE 1/23: normal b/l ventricular systolic function. mild MR.   - will continue to appreciate cardiology recs  - continue to monitor  - Cardiology consulted, f/u their recs  - per cards, w/ ectopy burden and new run of afib, increase metoprolol to 25mg bid w/ holding parameters, uptitrate as able      #Elevated Troponin   Troponin elevated, plateauing. EKG non-ischemic changes.  Likely 2/2 Demand ischemia in the setting of hypoperfusion/sepsis  - stop trending troponin.    Problem/Plan - 7:  ·  Problem: Dysphagia.   ·  Plan: P/w AMS. Per family bedside voice is more hoarse than baseline. Failed dysphagia testing this admission.    SS consulted. FEES 1/23 showed severe pharyngeal dysphagia. However vocal ford found to be inadequately closing.  - aspiration precautions.   - soft, bite sized diet w/ thin liquid per SS recs.   - f/u ENT consult for potential vocal cord dysfunction  - no straws at bedside to prevent aspiration.    Problem/Plan - 8:  ·  Problem: Melena.   ·  Plan: #r/o Melena  Alerted that pt had an episode of dark stool on 1/24 9am, assessed, appears dark and possibly melena.   Fecal occult blood positive. Patient had a repeat dark melena appearing stool later in AM.  Repeat CBC showed Hgb 11.8->12.0.  No known history of GI bleeds, patient has had a scope before (unsure exactly when) but was told it was normal.  - trend cbc  - PPI bid, pantoprazole 40mg  - monitor for further melena appearing stools  - GI consult for c/f gi bleed  - hold off on AC at this time.    Problem/Plan - 9:  ·  Problem: Ecchymosis on examination.   ·  Plan: Presented with extensive ecchymosis with various stages of healing on buttocks, back, LE and UE dorsal and ventral surfaces. Stage I Pressure ulcers on L and R buttocks and mid-thoracic region of trunk.   Reports minimal to no support at home.  CTAP noncon 1/22: no evidence of hematoma / fractures.   Most in the setting of falls. No AC medication.  - JA.    Problem/Plan - 10:  ·  Problem: Transaminitis.   ·  Plan; Downtrending.   AST>ALT w/ norm alk phos and bili suggestive of a hepatocellular pattern iso alcoholic hepatitis in addition to skeletal muscle injury given being found down.  Hypovolemic hypoperfusion also contributive.   - CMPs.    Problem/Plan - 11:  ·  Problem: Hypothyroidism.   ·  Plan: TSH elevated at 8.727. FT4 WNL. Subclinical.  - continue home Levothyroxine 88mcg PO    #Glaucoma  - continue home med Latanoprost 0.005%    #HTN  - holding Home med Ramipril 10 i/s/o hypovolemia.    Problem/Plan - 12:  ·  Problem: Prophylactic measure.   ·  Plan: Fluid: D5 100cc/hr  Electrolytes: replete PRN, cautions with hypernatremia correction as above.   Nutrition: soft bite sized  DVT ppx: hold for now iso c/f gi bleed  GI ppx: none for now   Code: DNR DNI ([facility])  Dispo: telemetry.

## 2024-01-25 NOTE — PROGRESS NOTE ADULT - SUBJECTIVE AND OBJECTIVE BOX
**** Acceptance from tele to [facility] ****    Hospital Course:  Ms. Overton is an 87YO F with PMH of Glaucoma (Latanoprost), Hypothyroidism, HTN, worked up MM who presented from [facility] due to being found conscious on the ground in her apartment, found to be in Atrial fibrillation with RVR and meeting severe sepsis criteria with a lactate of 2.5. Patient was admitted to telemetry for work-up of syncope, altered mental status, and further monitoring. The lactate quickly cleared and cardiology was consulted given her runs of afib with RVR, for which she received lopressor, after which she was transitioned to metoprolol tartrate without further runs of afib with RVR. The patient was initially hypernatremic for which she was treated with D5 with subsequent resolution. Her course was complicated by two episodes of melena for which GI was consulted who recommended a scope as an outpatient, and her hemoglobin remained stable without further occurrences of melena. The patient was placed on vEEG to rule out a seizure contributing though there is low suspicion for that at this time. The patient was deemed stable for stepdown to [facility].     OVERNIGHT EVENTS/INTERVAL HPI: Patient seen and examined at bedside. Has no complaints. States she has some left hand pain. Last BM was overnight.     REVIEW OF SYSTEMS:  All other review of systems is negative unless indicated above.    OBJECTIVE:  T(C): 36.9 (01-25-24 @ 14:00), Max: 37.8 (01-25-24 @ 05:25)  HR: 76 (01-25-24 @ 12:10) (64 - 94)  BP: 141/63 (01-25-24 @ 12:10) (110/60 - 144/72)  RR: 16 (01-25-24 @ 12:10) (16 - 17)  SpO2: 93% (01-25-24 @ 12:10) (93% - 97%)  Daily     Daily     Physical Exam:  General: NAD  Respiratory: CTAB; no wheezes/rales/rhonchi, normal WOB  Cardiovascular: Regular rhythm/rate, + S1/S2; no murmurs, rubs gallops   Gastrointestinal: Soft; NTND; bowel sounds hyperactive BS four quads  : no suprapubic tenderness. + primafit  Vascular: extremities WWP; no edema/cyanosis  Neurological: A&Ox3, no obvious focal deficits  Integument: Extensive ecchymosis with various stages of healing on buttocks, trunk, dorsal and ventral aspects of UE and LE B/L. Stage I Pressure ulcers on L and R buttocks and mid-thoracic region of trunk. +Swelling, warmth and erythema of L hand.      Medications:  MEDICATIONS  (STANDING):  latanoprost 0.005% Ophthalmic Solution 1 Drop(s) Both EYES at bedtime  levothyroxine 88 MICROGram(s) Oral daily  metoprolol tartrate 25 milliGRAM(s) Oral every 8 hours  pantoprazole    Tablet 40 milliGRAM(s) Oral every 12 hours  piperacillin/tazobactam IVPB.. 4.5 Gram(s) IV Intermittent every 8 hours    MEDICATIONS  (PRN):  acetaminophen     Tablet .. 650 milliGRAM(s) Oral every 6 hours PRN Temp greater or equal to 38C (100.4F), Mild Pain (1 - 3)  aluminum hydroxide/magnesium hydroxide/simethicone Suspension 30 milliLiter(s) Oral every 4 hours PRN Dyspepsia  melatonin 3 milliGRAM(s) Oral at bedtime PRN Insomnia  ondansetron Injectable 4 milliGRAM(s) IV Push every 8 hours PRN Nausea and/or Vomiting      Labs:                        10.7   12.70 )-----------( 135      ( 25 Jan 2024 05:30 )             33.3     01-25    143  |  112<H>  |  19  ----------------------------<  105<H>  3.4<L>   |  22  |  0.72    Ca    8.1<L>      25 Jan 2024 05:30  Phos  2.0     01-25  Mg     2.0     01-25    TPro  5.2<L>  /  Alb  2.2<L>  /  TBili  0.9  /  DBili  x   /  AST  59<H>  /  ALT  37  /  AlkPhos  88  01-25      Urinalysis Basic - ( 25 Jan 2024 05:30 )    Color: x / Appearance: x / SG: x / pH: x  Gluc: 105 mg/dL / Ketone: x  / Bili: x / Urobili: x   Blood: x / Protein: x / Nitrite: x   Leuk Esterase: x / RBC: x / WBC x   Sq Epi: x / Non Sq Epi: x / Bacteria: x          Radiology: Reviewed **** Acceptance from tele to [facility] ****    Hospital Course:  Ms. Overton is an 89 YO F with PMH of Glaucoma (Latanoprost), Hypothyroidism, HTN, worked up MM who presented from [facility] due to being found conscious on the ground in her apartment, found to be in Atrial fibrillation with RVR and meeting severe sepsis criteria with a lactate of 2.5. Patient was admitted to telemetry for work-up of syncope, altered mental status, and further monitoring. The lactate quickly cleared and cardiology was consulted given her runs of afib with RVR, for which she received lopressor, after which she was transitioned to metoprolol tartrate without further runs of afib with RVR. The patient was initially hypernatremic for which she was treated with D5 with subsequent resolution. Her course was complicated by two episodes of melena for which GI was consulted who recommended a scope as an outpatient, and her hemoglobin remained stable without further occurrences of melena. The patient was placed on vEEG to rule out a seizure contributing though there is low suspicion for that at this time. The patient was deemed stable for stepdown to [facility].     OVERNIGHT EVENTS/INTERVAL HPI: Patient seen and examined at bedside. Has no complaints. States she has some left hand pain. Last BM was overnight.     REVIEW OF SYSTEMS:  All other review of systems is negative unless indicated above.    OBJECTIVE:  T(C): 36.9 (01-25-24 @ 14:00), Max: 37.8 (01-25-24 @ 05:25)  HR: 76 (01-25-24 @ 12:10) (64 - 94)  BP: 141/63 (01-25-24 @ 12:10) (110/60 - 144/72)  RR: 16 (01-25-24 @ 12:10) (16 - 17)  SpO2: 93% (01-25-24 @ 12:10) (93% - 97%)  Daily     Daily     Physical Exam:  General: NAD  Respiratory: CTAB; no wheezes/rales/rhonchi, normal WOB  Cardiovascular: Regular rhythm/rate, + S1/S2; no murmurs, rubs gallops   Gastrointestinal: Soft; NTND; bowel sounds hyperactive BS four quads  : no suprapubic tenderness. + primafit  Vascular: extremities WWP; no edema/cyanosis  Neurological: A&Ox3, no obvious focal deficits  Integument: Extensive ecchymosis with various stages of healing on buttocks, trunk, dorsal and ventral aspects of UE and LE B/L. Stage I Pressure ulcers on L and R buttocks and mid-thoracic region of trunk. +Swelling, warmth and erythema of L hand.      Medications:  MEDICATIONS  (STANDING):  latanoprost 0.005% Ophthalmic Solution 1 Drop(s) Both EYES at bedtime  levothyroxine 88 MICROGram(s) Oral daily  metoprolol tartrate 25 milliGRAM(s) Oral every 8 hours  pantoprazole    Tablet 40 milliGRAM(s) Oral every 12 hours  piperacillin/tazobactam IVPB.. 4.5 Gram(s) IV Intermittent every 8 hours    MEDICATIONS  (PRN):  acetaminophen     Tablet .. 650 milliGRAM(s) Oral every 6 hours PRN Temp greater or equal to 38C (100.4F), Mild Pain (1 - 3)  aluminum hydroxide/magnesium hydroxide/simethicone Suspension 30 milliLiter(s) Oral every 4 hours PRN Dyspepsia  melatonin 3 milliGRAM(s) Oral at bedtime PRN Insomnia  ondansetron Injectable 4 milliGRAM(s) IV Push every 8 hours PRN Nausea and/or Vomiting      Labs:                        10.7   12.70 )-----------( 135      ( 25 Jan 2024 05:30 )             33.3     01-25    143  |  112<H>  |  19  ----------------------------<  105<H>  3.4<L>   |  22  |  0.72    Ca    8.1<L>      25 Jan 2024 05:30  Phos  2.0     01-25  Mg     2.0     01-25    TPro  5.2<L>  /  Alb  2.2<L>  /  TBili  0.9  /  DBili  x   /  AST  59<H>  /  ALT  37  /  AlkPhos  88  01-25      Urinalysis Basic - ( 25 Jan 2024 05:30 )    Color: x / Appearance: x / SG: x / pH: x  Gluc: 105 mg/dL / Ketone: x  / Bili: x / Urobili: x   Blood: x / Protein: x / Nitrite: x   Leuk Esterase: x / RBC: x / WBC x   Sq Epi: x / Non Sq Epi: x / Bacteria: x          Radiology: Reviewed

## 2024-01-25 NOTE — PROGRESS NOTE ADULT - PROBLEM SELECTOR PLAN 9
TSH elevated at 8.727. FT4 WNL. Subclinical.  - continue home Levothyroxine 88mcg PO    #Glaucoma  - continue home med Latanoprost 0.005%    #HTN  - holding Home med Ramipril 10 i/s/o hypovolemia. Fluid: tolerating PO  Electrolytes: replete PRN, cautions with hypernatremia correction as above.   Nutrition: soft bite sized  DVT ppx: hold for now iso c/f gi bleed  GI ppx: none for now   Code: DNR DNI ([facility])  Dispo: RMF

## 2024-01-25 NOTE — PROGRESS NOTE ADULT - PROBLEM SELECTOR PLAN 7
Resolved. P/w hypernatremia 149 s/p fall and poor PO intake x 2 days. Most likely 2/2 hypovolemia, evident w/ concentrated urine on exam. 2.1L Free water deficit. on 1L D5 w/ 1AMP bicarb @ 80cc/hr.   - correct free water deficit  - stop the bicarb in D5, Na 150-> 145 -> 142, D5 stopped  - cautions with overcorrection (goal <0.5meq /hr)   - CMPs      #HAGMA - RESOLVED  #elevated serum lactate - RESOLVED  POA w/ HAGMA w/ most likely cause to be lactic acidosis. resolved w/ D5 w/ 1AMP bicarb

## 2024-01-25 NOTE — PROGRESS NOTE ADULT - PROBLEM SELECTOR PLAN 12
Fluid: D5 100cc/hr  Electrolytes: replete PRN, cautions with hypernatremia correction as above.   Nutrition: soft bite sized  DVT ppx: hold for now iso c/f gi bleed  GI ppx: none for now   Code: DNR DNI ([facility])  Dispo: telemetry - > RMF

## 2024-01-25 NOTE — PROGRESS NOTE ADULT - PROBLEM SELECTOR PLAN 8
P/w AMS. Per family bedside voice is more hoarse than baseline. Failed dysphagia testing this admission.    SS consulted. FEES 1/23 showed severe pharyngeal dysphagia. However vocal ford found to be inadequately closing.  - aspiration precautions  - soft, bite sized diet w/ thin liquid per SS recs  - ENT recommending outpatient follow up and continue working w/ SLP  - no straws at bedside to prevent aspiration TSH elevated at 8.727. FT4 WNL. Subclinical.  - continue home Levothyroxine 88mcg PO    #Glaucoma  - continue home med Latanoprost 0.005%    #HTN  - holding Home med Ramipril 10 i/s/o hypovolemia.

## 2024-01-25 NOTE — PROGRESS NOTE ADULT - PROBLEM SELECTOR PLAN 6
Presented w/ 2/4 SIRS (HR, WBC) w/ elevated lactate. Lactate resolved s/p IVF. No clear infectious source.   COVID/Influenza/RSV negative. UA shows few bacteria but WBC <10, neg LE/ nitrate. Leukocytosis downtrending (although elevated WBC can also be confounded by possible Dx of MM). s/p CTX  CTPE, CTAP unremarkable.  Procal 0.58.   DDx: Reactive iso fall/found down for multiple days vs. infectious  - zosyn 4.5 empiric given AMS. -> complete 7 day course 1/22-1/29  - f/u UCx, BCx   - labs, VS monitoring    #possible MM  pt is currently being worked up for MM per outpt. Resolved.   P/w hypernatremia 149 s/p fall and poor PO intake x 2 days. Most likely 2/2 hypovolemia, evident w/ concentrated urine on exam.     #HAGMA - RESOLVED  #elevated serum lactate - RESOLVED  POA w/ HAGMA w/ most likely cause to be lactic acidosis. resolved w/ D5 w/ 1AMP bicarb

## 2024-01-25 NOTE — PROGRESS NOTE ADULT - ASSESSMENT
89yo F with PMHx of Glaucoma, Hypothyroidism, HTN who presented from [facility] due to being found conscious on the ground in her apartment after a fall, found to meet SIRS criteria (sepsis vs. reactive) and ectopy, now admitted to telemetry for work-up of syncope, altered mental status, and further monitoring now stable for stepdown to F.

## 2024-01-25 NOTE — PROGRESS NOTE ADULT - ASSESSMENT
87YO F with PMHx of HTN, HLD, PAD, Hypothyroidism, monoclonal gammopathy who presented to [facility] after being found on the ground in her apartment without syncopal event. Admitted to medicine telemetry for rhabdomyolysis, AMS and multiple electrolyte derangements. Cardiology consulted for frequent PVC's. Also found to have paroxysmal Afib.     REVIEW OF STUDIES:   Tele:   ECG: Sinus tachycardia with bigeminy. Normal axis. No ischemic changes  TTE 1/23: Normal LV function, no high gradients across LVOT ( 12mmHg) suggestive of LVOT obstruction, Grade 1 DD, mild MR, trace TR, PASP 32mmHg     #Asymptomatic PVC's   Normal LV function with frequent PVC's ( couplets/ bigeminy/ isolated PVC's)   She is asymptomatic at this time   Given ectopy burden, started on metoprolol. Given runs of AF, increase metoprolol to 25mg BID   Her PVC's were likely in the setting of electrolyte abnormalities/ rhabdomyolysis which are improving   Keep K > 4 and Magnesium > 2    #Paroxysmal Afib   Noted on tele today   Increase metoprolol as above  CHADVASC 4 ( age/ gender/ vascular disease)   Recommend heparin gtt before starting NOAC but patient had episodes of melena this afternoon and is currently being workup up   AC when she can tolerate. In the future, if she is unable to tolerate AC, may need LAAO device     INCOMPLETE  89YO F with PMHx of HTN, HLD, PAD, Hypothyroidism, monoclonal gammopathy who presented to [facility] after being found on the ground in her apartment without syncopal event. Admitted to medicine telemetry for rhabdomyolysis, AMS and multiple electrolyte derangements. Cardiology consulted for frequent PVC's. Also found to have paroxysmal Afib.     REVIEW OF STUDIES:   Tele: Short runs of Afib this AM and last evening. She is predominantly in normal sinus rhythm. PVC burden has decreased overall   ECG: Sinus tachycardia with bigeminy. Normal axis. No ischemic changes  TTE 1/23: Normal LV function, no high gradients across LVOT ( 12mmHg) suggestive of LVOT obstruction, Grade 1 DD, mild MR, trace TR, PASP 32mmHg     #Asymptomatic PVC's   Normal LV function with frequent PVC's ( couplets/ bigeminy/ isolated PVC's)   She is asymptomatic at this time   Given ectopy burden, started on metoprolol. Given runs of AF, increase metoprolol to 25mg TID  Her PVC's were likely in the setting of electrolyte abnormalities/ rhabdomyolysis which are improving   Keep K > 4 and Magnesium > 2    #Paroxysmal Afib   Brief episodes of Afib noted on tele. She has no history of AF  Increase metoprolol as above  CHADVASC 4 ( age/ gender/ vascular disease)   Recommended heparin gtt before starting NOAC but patient had a few episodes of melena and is currently being worked up for GIB   AC when she can tolerate. In the future, if she is unable to tolerate AC, may consider LAAO device     Cardiology to follow

## 2024-01-25 NOTE — PROGRESS NOTE ADULT - PROBLEM SELECTOR PLAN 10
Downtrending.   AST>ALT w/ norm alk phos and bili suggestive of a hepatocellular pattern iso alcoholic hepatitis in addition to skeletal muscle injury given being found down.  Hypovolemic hypoperfusion also contributive.   - CMPs

## 2024-01-25 NOTE — PROGRESS NOTE ADULT - PROBLEM SELECTOR PLAN 9
Presented with extensive ecchymosis with various stages of healing on buttocks, back, LE and UE dorsal and ventral surfaces. Stage I Pressure ulcers on L and R buttocks and mid-thoracic region of trunk.   Reports minimal to no support at home.  CTAP noncon 1/22: no evidence of hematoma / fractures.   Most in the setting of falls. No AC medication.  - JA

## 2024-01-25 NOTE — PROGRESS NOTE ADULT - PROBLEM SELECTOR PLAN 6
Presented w/ 2/4 SIRS (HR, WBC) w/ elevated lactate. Lactate resolved s/p IVF. No clear infectious source.   COVID/Influenza/RSV negative. UA shows few bacteria but WBC <10, neg LE/ nitrate. Leukocytosis downtrending (although elevated WBC can also be confounded by possible Dx of MM). s/p CTX  CTPE, CTAP unremarkable.  Procal 0.58.   DDx: Reactive iso fall/found down for multiple days vs. infectious  - zosyn 4.5 empiric given AMS. -> complete 7 day course 1/22-1/29  - f/u UCx, BCx   - labs, VS monitoring    #possible MM  pt is currently being worked up for MM per outpt.    - obtain collateral outpt cardiologist/ PCP Dr. Surendra Blanchard.

## 2024-01-25 NOTE — PROGRESS NOTE ADULT - PROBLEM SELECTOR PLAN 4
Presented with ventricular ectopy. No previous cardiac history. Afib initially ruled outed, however run of afib 1/24 AM which returned to NSR.   Likely 2/2 electrolyte abnormalities versus contributions from afib as per above .   TTE 1/23: normal b/l ventricular systolic function. mild MR.   - will continue to appreciate cardiology recs  - continue to monitor  - Cardiology consulted, f/u their recs  - per cards, w/ ectopy burden and new run of afib, increase metoprolol to 25mg tid w/ holding parameters      #Elevated Troponin   Troponin elevated, plateauing. EKG non-ischemic changes.  Likely 2/2 Demand ischemia in the setting of hypoperfusion/sepsis  - stop trending troponin

## 2024-01-25 NOTE — PROGRESS NOTE ADULT - ASSESSMENT
88F with PMH of glaucoma (Latanoprost), hypothyroidism, HTN, worked up MM, presented to ED due to being found conscious on the ground in her apartment, now admitted to telemetry for work-up of syncope, altered mental status, and further monitoring. Course complicated by 4-5 episodes of melena on 01/24/24, so GI consulted for UGIB.    Patient states she had an EGD and colonoscopy 10 or so years ago. Has remained hemodynamically stable with Hb of 12.     Recommendations:   - trend Hb and maintain active type and screen   - continue pantoprazole 40 mg IV BID   - will consider EGD once patient is no longer on vEEG   - monitor for further episodes of melena     Case discussed with Dr. Winters. GI Team will continue to follow.     Jacquelyn Gamble D.O.   Gastroenterology Fellow  Weekday 7am-5pm Pager: [phone]  Weeknights/Weekend/Holiday Coverage: Please call the  for contact info

## 2024-01-25 NOTE — PROGRESS NOTE ADULT - SUBJECTIVE AND OBJECTIVE BOX
GI Consult Progress Note:     OVERNIGHT EVENTS: RON.    SUBJECTIVE / INTERVAL HPI:   Patient seen and examined at bedside. Reports that she feels well. Still on vEEG. No further episodes of coffee ground emesis. Denies any melena or hematochezia. Hb stable.     VITAL SIGNS:  Vital Signs Last 24 Hrs  T(C): 37.8 (25 Jan 2024 05:25), Max: 37.8 (25 Jan 2024 05:25)  T(F): 100 (25 Jan 2024 05:25), Max: 100 (25 Jan 2024 05:25)  HR: 64 (25 Jan 2024 08:30) (64 - 94)  BP: 144/70 (25 Jan 2024 08:30) (110/60 - 148/69)  BP(mean): 100 (25 Jan 2024 08:30) (80 - 102)  RR: 16 (25 Jan 2024 08:30) (16 - 17)  SpO2: 95% (25 Jan 2024 08:30) (93% - 98%)    Parameters below as of 25 Jan 2024 08:30  Patient On (Oxygen Delivery Method): room air        01-24-24 @ 07:01  -  01-25-24 @ 07:00  --------------------------------------------------------  IN: 700 mL / OUT: 900 mL / NET: -200 mL      PHYSICAL EXAM:  General: No acute distress, on vEEG   Lungs: Normal respiratory effort and no intercostal retractions  Cardiovascular: RRR  Abdomen: Soft, non-tender, non-distended  Neurological: Alert and oriented x3  Skin: Warm and dry. No obvious rash      MEDICATIONS:  MEDICATIONS  (STANDING):  latanoprost 0.005% Ophthalmic Solution 1 Drop(s) Both EYES at bedtime  levothyroxine 88 MICROGram(s) Oral daily  metoprolol tartrate 25 milliGRAM(s) Oral every 12 hours  pantoprazole  Injectable 40 milliGRAM(s) IV Push every 12 hours  piperacillin/tazobactam IVPB.. 4.5 Gram(s) IV Intermittent every 8 hours    MEDICATIONS  (PRN):  acetaminophen     Tablet .. 650 milliGRAM(s) Oral every 6 hours PRN Temp greater or equal to 38C (100.4F), Mild Pain (1 - 3)  aluminum hydroxide/magnesium hydroxide/simethicone Suspension 30 milliLiter(s) Oral every 4 hours PRN Dyspepsia  melatonin 3 milliGRAM(s) Oral at bedtime PRN Insomnia  ondansetron Injectable 4 milliGRAM(s) IV Push every 8 hours PRN Nausea and/or Vomiting      ALLERGIES:  Allergies    Flagyl (Other)    Intolerances    Augmentin (Diarrhea)  NSAIDs (Other)  dairy products (Diarrhea)  Cipro (Diarrhea)      LABS:                        10.7   12.70 )-----------( 135      ( 25 Jan 2024 05:30 )             33.3     01-25    143  |  112<H>  |  19  ----------------------------<  105<H>  3.4<L>   |  22  |  0.72    Ca    8.1<L>      25 Jan 2024 05:30  Phos  2.0     01-25  Mg     2.0     01-25    TPro  5.2<L>  /  Alb  2.2<L>  /  TBili  0.9  /  DBili  x   /  AST  59<H>  /  ALT  37  /  AlkPhos  88  01-25      Urinalysis Basic - ( 25 Jan 2024 05:30 )    Color: x / Appearance: x / SG: x / pH: x  Gluc: 105 mg/dL / Ketone: x  / Bili: x / Urobili: x   Blood: x / Protein: x / Nitrite: x   Leuk Esterase: x / RBC: x / WBC x   Sq Epi: x / Non Sq Epi: x / Bacteria: x      CAPILLARY BLOOD GLUCOSE      POCT Blood Glucose.: 127 mg/dL (24 Jan 2024 03:49)        RADIOLOGY & ADDITIONAL TESTS: Reviewed.

## 2024-01-25 NOTE — PROGRESS NOTE ADULT - PROBLEM SELECTOR PLAN 1
No known history of Afib, HR 84. Not on on home medications as patient unaware of afib/never has been told she has afib.   It is possible patient had this prior to admission and has paroxysmal afib, versus potential sickness triggering the afib.   - CHADSVASC 3; HASBLED 1  - per cardiology, will consider trialing heparin/lovenox given MGUS/MM history, however will hold off on this at this time given concern for GI bleed  - c/w metoprolol 25mg q8 for rate control  - continued discussion regarding AC versus holding off on AC given bleeding risk  - Goal HR <110 [RACE II trial] No known history of Afib, HR 84. Not on on home medications as patient unaware of afib/never has been told she has afib.   It is possible patient had this prior to admission and has paroxysmal afib, versus potential sickness triggering the afib.   - CHADSVASC 3; HASBLED 1  - per cardiology, will consider trialing heparin/lovenox given MGUS/MM history, however will hold off on this at this time given concern for GI bleed  - c/w metoprolol 25mg q8 for rate control  - continued discussion regarding AC versus holding off on AC given bleeding risk  - Goal HR <110 [RACE II trial]  - Consider trial of heparin gtt in AM     #Ventricular ectopy   Presented with ventricular ectopy. No previous cardiac history. Likely 2/2 electrolyte abnormalities versus contributions from afib as per above .   TTE 1/23: normal b/l ventricular systolic function. mild MR.   - per cards, w/ ectopy burden and new run of afib, increase metoprolol to 25mg tid w/ holding parameters

## 2024-01-25 NOTE — PROGRESS NOTE ADULT - PROBLEM SELECTOR PLAN 8
P/w AMS. Per family bedside voice is more hoarse than baseline. Failed dysphagia testing this admission.    SS consulted. FEES 1/23 showed severe pharyngeal dysphagia. However vocal ford found to be inadequately closing.  - aspiration precautions.   - soft, bite sized diet w/ thin liquid per SS recs.   - f/u ENT consult for potential vocal cord dysfunction  - no straws at bedside to prevent aspiration

## 2024-01-25 NOTE — PROGRESS NOTE ADULT - ATTENDING COMMENTS
In SR and beta blocker increased per Card. Complete 7 days Zosyn. Warm soaks to left hand where IV likely infiltrated. GI eval at this time and no plan for anticoagulation as discussed with pt and her nieces in setting of melena. Can continue DVT prophylaxis.. PT.

## 2024-01-25 NOTE — PROGRESS NOTE ADULT - PROBLEM SELECTOR PLAN 3
Improving.   Presented with AMS. Found down after 2 days after likely fall at home. UTox, salicylate, acetaminophen neg.   However c/o R lateral tongue pain.   CTH: neg for acute pathology. neurological causes unlikely.   DDx: infectious (meets severe sepsis criteria as below) vs. Uremia (elevated BUN) vs. Metabolic hypothyroidism vs electrolyte derangements vs. seizure.   - vEEG r/o seizure, still pending  - correct electrolytes, K repleted overnight. HyperNa as per below.   - empiric Abx: c Zosyn 4.5 q8, 7 day course 1/22-1/29

## 2024-01-26 ENCOUNTER — TRANSCRIPTION ENCOUNTER (OUTPATIENT)
Age: 89
End: 2024-01-26

## 2024-01-26 PROBLEM — H40.9 UNSPECIFIED GLAUCOMA: Chronic | Status: ACTIVE | Noted: 2024-01-23

## 2024-01-26 LAB
ALBUMIN SERPL ELPH-MCNC: 2.4 G/DL — LOW (ref 3.3–5)
ALP SERPL-CCNC: 82 U/L — SIGNIFICANT CHANGE UP (ref 40–120)
ALT FLD-CCNC: 35 U/L — SIGNIFICANT CHANGE UP (ref 10–45)
ANION GAP SERPL CALC-SCNC: 11 MMOL/L — SIGNIFICANT CHANGE UP (ref 5–17)
AST SERPL-CCNC: 47 U/L — HIGH (ref 10–40)
B PERT IGG+IGM PNL SER: SIGNIFICANT CHANGE UP
BILIRUB SERPL-MCNC: 0.9 MG/DL — SIGNIFICANT CHANGE UP (ref 0.2–1.2)
BUN SERPL-MCNC: 12 MG/DL — SIGNIFICANT CHANGE UP (ref 7–23)
CALCIUM SERPL-MCNC: 8 MG/DL — LOW (ref 8.4–10.5)
CHLORIDE SERPL-SCNC: 107 MMOL/L — SIGNIFICANT CHANGE UP (ref 96–108)
CO2 SERPL-SCNC: 21 MMOL/L — LOW (ref 22–31)
COLOR FLD: SIGNIFICANT CHANGE UP
CREAT SERPL-MCNC: 0.66 MG/DL — SIGNIFICANT CHANGE UP (ref 0.5–1.3)
EGFR: 84 ML/MIN/1.73M2 — SIGNIFICANT CHANGE UP
FLUID INTAKE SUBSTANCE CLASS: SIGNIFICANT CHANGE UP
GLUCOSE SERPL-MCNC: 97 MG/DL — SIGNIFICANT CHANGE UP (ref 70–99)
HCT VFR BLD CALC: 34.9 % — SIGNIFICANT CHANGE UP (ref 34.5–45)
HCT VFR BLD CALC: 36.1 % — SIGNIFICANT CHANGE UP (ref 34.5–45)
HGB BLD-MCNC: 11.5 G/DL — SIGNIFICANT CHANGE UP (ref 11.5–15.5)
HGB BLD-MCNC: 11.8 G/DL — SIGNIFICANT CHANGE UP (ref 11.5–15.5)
MAGNESIUM SERPL-MCNC: 1.9 MG/DL — SIGNIFICANT CHANGE UP (ref 1.6–2.6)
MCHC RBC-ENTMCNC: 32 PG — SIGNIFICANT CHANGE UP (ref 27–34)
MCHC RBC-ENTMCNC: 32.4 PG — SIGNIFICANT CHANGE UP (ref 27–34)
MCHC RBC-ENTMCNC: 32.7 GM/DL — SIGNIFICANT CHANGE UP (ref 32–36)
MCHC RBC-ENTMCNC: 33 GM/DL — SIGNIFICANT CHANGE UP (ref 32–36)
MCV RBC AUTO: 97.8 FL — SIGNIFICANT CHANGE UP (ref 80–100)
MCV RBC AUTO: 98.3 FL — SIGNIFICANT CHANGE UP (ref 80–100)
NRBC # BLD: 0 /100 WBCS — SIGNIFICANT CHANGE UP (ref 0–0)
NRBC # BLD: 0 /100 WBCS — SIGNIFICANT CHANGE UP (ref 0–0)
PHOSPHATE SERPL-MCNC: 3 MG/DL — SIGNIFICANT CHANGE UP (ref 2.5–4.5)
PLATELET # BLD AUTO: 127 K/UL — LOW (ref 150–400)
PLATELET # BLD AUTO: 127 K/UL — LOW (ref 150–400)
POTASSIUM SERPL-MCNC: 2.9 MMOL/L — CRITICAL LOW (ref 3.5–5.3)
POTASSIUM SERPL-SCNC: 2.9 MMOL/L — CRITICAL LOW (ref 3.5–5.3)
PROT SERPL-MCNC: 5.1 G/DL — LOW (ref 6–8.3)
RBC # BLD: 3.55 M/UL — LOW (ref 3.8–5.2)
RBC # BLD: 3.69 M/UL — LOW (ref 3.8–5.2)
RBC # FLD: 16.3 % — HIGH (ref 10.3–14.5)
RBC # FLD: 16.3 % — HIGH (ref 10.3–14.5)
RCV VOL RI: HIGH /UL (ref 0–0)
SODIUM SERPL-SCNC: 139 MMOL/L — SIGNIFICANT CHANGE UP (ref 135–145)
TUBE TYPE: SIGNIFICANT CHANGE UP
WBC # BLD: 10.49 K/UL — SIGNIFICANT CHANGE UP (ref 3.8–10.5)
WBC # BLD: 12.04 K/UL — HIGH (ref 3.8–10.5)
WBC # FLD AUTO: 10.49 K/UL — SIGNIFICANT CHANGE UP (ref 3.8–10.5)
WBC # FLD AUTO: 12.04 K/UL — HIGH (ref 3.8–10.5)

## 2024-01-26 PROCEDURE — 99232 SBSQ HOSP IP/OBS MODERATE 35: CPT

## 2024-01-26 PROCEDURE — 99233 SBSQ HOSP IP/OBS HIGH 50: CPT | Mod: GC

## 2024-01-26 PROCEDURE — 99232 SBSQ HOSP IP/OBS MODERATE 35: CPT | Mod: GC

## 2024-01-26 PROCEDURE — 73201 CT UPPER EXTREMITY W/DYE: CPT | Mod: 26,LT

## 2024-01-26 PROCEDURE — 95720 EEG PHY/QHP EA INCR W/VEEG: CPT

## 2024-01-26 RX ORDER — POTASSIUM CHLORIDE 20 MEQ
40 PACKET (EA) ORAL ONCE
Refills: 0 | Status: COMPLETED | OUTPATIENT
Start: 2024-01-26 | End: 2024-01-26

## 2024-01-26 RX ORDER — POTASSIUM CHLORIDE 20 MEQ
10 PACKET (EA) ORAL
Refills: 0 | Status: COMPLETED | OUTPATIENT
Start: 2024-01-26 | End: 2024-01-26

## 2024-01-26 RX ORDER — POTASSIUM CHLORIDE 20 MEQ
40 PACKET (EA) ORAL EVERY 4 HOURS
Refills: 0 | Status: COMPLETED | OUTPATIENT
Start: 2024-01-26 | End: 2024-01-26

## 2024-01-26 RX ORDER — LIDOCAINE HCL 20 MG/ML
10 VIAL (ML) INJECTION ONCE
Refills: 0 | Status: COMPLETED | OUTPATIENT
Start: 2024-01-26 | End: 2024-01-26

## 2024-01-26 RX ADMIN — LATANOPROST 1 DROP(S): 0.05 SOLUTION/ DROPS OPHTHALMIC; TOPICAL at 22:19

## 2024-01-26 RX ADMIN — PANTOPRAZOLE SODIUM 40 MILLIGRAM(S): 20 TABLET, DELAYED RELEASE ORAL at 21:56

## 2024-01-26 RX ADMIN — Medication 25 MILLIGRAM(S): at 21:55

## 2024-01-26 RX ADMIN — Medication 100 MILLIEQUIVALENT(S): at 12:29

## 2024-01-26 RX ADMIN — Medication 100 MILLIEQUIVALENT(S): at 07:33

## 2024-01-26 RX ADMIN — Medication 650 MILLIGRAM(S): at 22:19

## 2024-01-26 RX ADMIN — PIPERACILLIN AND TAZOBACTAM 25 GRAM(S): 4; .5 INJECTION, POWDER, LYOPHILIZED, FOR SOLUTION INTRAVENOUS at 05:27

## 2024-01-26 RX ADMIN — Medication 25 MILLIGRAM(S): at 05:27

## 2024-01-26 RX ADMIN — Medication 650 MILLIGRAM(S): at 23:00

## 2024-01-26 RX ADMIN — Medication 40 MILLIEQUIVALENT(S): at 12:28

## 2024-01-26 RX ADMIN — PIPERACILLIN AND TAZOBACTAM 25 GRAM(S): 4; .5 INJECTION, POWDER, LYOPHILIZED, FOR SOLUTION INTRAVENOUS at 21:55

## 2024-01-26 RX ADMIN — PANTOPRAZOLE SODIUM 40 MILLIGRAM(S): 20 TABLET, DELAYED RELEASE ORAL at 12:29

## 2024-01-26 RX ADMIN — Medication 25 MILLIGRAM(S): at 12:29

## 2024-01-26 RX ADMIN — Medication 88 MICROGRAM(S): at 05:27

## 2024-01-26 RX ADMIN — Medication 100 MILLIEQUIVALENT(S): at 09:50

## 2024-01-26 RX ADMIN — Medication 40 MILLIEQUIVALENT(S): at 07:32

## 2024-01-26 RX ADMIN — PIPERACILLIN AND TAZOBACTAM 25 GRAM(S): 4; .5 INJECTION, POWDER, LYOPHILIZED, FOR SOLUTION INTRAVENOUS at 12:29

## 2024-01-26 NOTE — ADVANCED PRACTICE NURSE CONSULT - RECOMMEDATIONS
Right lateral malleolus and foot: Cavilon No Sting Barrier film daily ; offload with pillows  Left hand: xeroform, gauze, Scot applied. Pending imaging and possible surgery consult    Discussed with pt at bedside, primary RN and MD team.     Continue pressure injury prevention including frequent repositioning, offloading heels, optimizing nutrition and moisture management.    Please reconsult if wounds deteriorate or new concerns arise.

## 2024-01-26 NOTE — EEG REPORT - NS EEG TEXT BOX
[facility] Department of Neurology  Inpatient Continuous video-Electroencephalogram  [address], [address] [place] [id number], T: [phone]    Patient Name:	ROSE HINES    :	1935  MRN:	[id number]    Study Start Date/Time:	2024, 3:23:59 PM  Study End Date/Time:	2024, 9:00 AM    Referred by:  Anisha Reich MD    Brief Clinical History:  ROSE HINES is a 88 year old Female found down and unconscious but now awake; study performed to investigate for seizures or markers of epilepsy.   Technologist notes: -  Diagnosis Code:  R56.9 convulsions/seizure    Pertinent Medication:  n/a    Acquisition Details:  Electroencephalography was acquired using a minimum of 21 channels on an [employer] Neurology system v 9.3.1 with electrode placement according to the standard International 10-20 system following ACNS (American Clinical Neurophysiology Society) guidelines.  Anterior temporal T1 and T2 electrodes were utilized whenever possible.  The [employer] automated spike & seizure detections were all reviewed in detail, in addition to the entire raw EEG.    Findings:  Day 1:  2024, 3:23:59 PM to next morning at 07:00 AM   Background:  continuous, with predominantly alpha > theta frequencies.  Generalized Slowing:  Intermittent semi-rhythmic and irregular sharply contoured theta/delta during wakeful periods  Symmetry/Focality: No persistent asymmetries of voltage or frequency.     Voltage:  Normal (20+ uV)  Organization:  Appropriate anterior-posterior gradient  Posterior Dominant Rhythm:  9 Hz symmetric, well-organized, and well-modulated  Sleep:  Symmetric, synchronous spindles and K complexes.  Variability:   Yes		Reactivity:  Yes    Spontaneous Activity:  No definite epileptiform discharges, but occasional sharply contoured activity embedded within irregular theta/delta.   Events:  •	No electrographic seizures or significant clinical events occurred during this study.  Provocations:  •	Hyperventilation: was not performed.  •	Photic stimulation: was not performed.  Daily Summary:    •	There was mild excess intermittent slow wave activity, sometimes sharply contoured which may suggest cortical irritability, but not morphologically not having enough characteristics to declare an epileptogenic focus      Bronson Vasquez MD  Attending Neurologist, [facility]        Daily Updates  Day 2  Tristian 25 @7:00 am-  @09:00  Background:  continuous, with predominantly alpha > theta frequencies.  Generalized Slowing:  Intermittent semi-rhythmic and irregular sharply contoured theta/delta during wakeful periods  Symmetry/Focality: No persistent asymmetries of voltage or frequency.     Voltage:  Normal (20+ uV)  Organization:  Appropriate anterior-posterior gradient  Posterior Dominant Rhythm:  9 Hz symmetric, well-organized, and well-modulated  Sleep:  Symmetric, synchronous spindles and K complexes.  Variability:   Yes		Reactivity:  Yes    Spontaneous Activity:  Occasional sharply contoured theta or delta waves.   Events:  •	No electrographic seizures or significant clinical events occurred during this study.  Provocations:  •	Hyperventilation: was not performed.  •	Photic stimulation: was not performed.  Daily Summary:    •	There was abundant intermittent slow wave activity, sometimes sharply contoured which may suggest cortical irritability.  Similar to prior recording, these waves did nor reach an epileptiform morphology.      Bronson Vasquez MD  Attending Neurologist, [facility]      FINAL Impression:  Abnormal Continuous/long-term video-EEG  1)	There was abundant polymorphic delta slowing over both hemispheres.  2)	There were sharply contoured discharges over both hemispheres, but not definitely epileptiform.      Final Clinical Correlation:  1)	There were indicators of bihemispheric cerebral dysfunction  2)	There were findings to suggest cortical irritability, without evidence for a single epileptogenic focus.  This does not rule out the possibility of epilepsy, but these studies are more in keeping with diffuse or multifocal cortical irritability which may be systemic/metabolic in nature.      Bronson Vasquez MD   Attending Neurologist, [facility]

## 2024-01-26 NOTE — PROGRESS NOTE ADULT - ATTENDING COMMENTS
87yo F with PMHx of Glaucoma, Hypothyroidism, HTN who presented from [facility] due to being found conscious on the ground in her apartment after a fall, found to meet SIRS criteria (sepsis vs. reactive) and ectopy, now admitted to telemetry for work-up of syncope, altered mental status, and further monitoring now stable for stepdown to F. New episode of melena, HDS.     Labs and imaging reviewed    Problem List  #Acute Blood Loss Anemia  #UGIB  #Hypothyroidism  #Afib  #Glaucoma  #Hand swelling    Problem List  -Reconsult GI given persistent melena, could still be delayed clearance of previous bleed  -Monitor H/H

## 2024-01-26 NOTE — PROGRESS NOTE ADULT - PROBLEM SELECTOR PLAN 5
Presented w/ 2/4 SIRS (HR, WBC) w/ elevated lactate. Lactate resolved s/p IVF. No clear infectious source.   COVID/Influenza/RSV and UAnegative.   CTPE, CTAP unremarkable; Procal 0.58; DDx: Reactive iso fall/found down for multiple days vs. infectious  - zosyn 4.5 empiric given AMS. -> complete 7 day course 1/22-1/29    #possible MM  pt is currently being worked up for MM per outpt.

## 2024-01-26 NOTE — DISCHARGE NOTE PROVIDER - NSDCMRMEDTOKEN_GEN_ALL_CORE_FT
LATANOPROST 0.005% EYE DROPS: INSTILL 1 DROP INTO AFFECTED EYE(S) ONCE DAILY IN THE EVENING  LEVOTHYROXINE 88 MCG TABLET: TAKE 1 TABLET BY MOUTH EVERY DAY  RAMIPRIL 10 MG CAPSULE: TAKE 1 CAPSULE BY MOUTH EVERY DAY   carvedilol 6.25 mg oral tablet: 1 tab(s) orally every 12 hours  ceFAZolin 2 g intravenous injection: 2 gram(s) intravenous every 8 hours  LATANOPROST 0.005% EYE DROPS: INSTILL 1 DROP INTO AFFECTED EYE(S) ONCE DAILY IN THE EVENING  LEVOTHYROXINE 88 MCG TABLET: TAKE 1 TABLET BY MOUTH EVERY DAY  pantoprazole 40 mg oral delayed release tablet: 1 tab(s) orally every 12 hours  RAMIPRIL 10 MG CAPSULE: TAKE 1 CAPSULE BY MOUTH EVERY DAY   carvedilol 6.25 mg oral tablet: 1 tab(s) orally every 12 hours  Keflex 500 mg oral capsule: 1 cap(s) orally 4 times a day please take 1 tab 4 times a day for 7 day course (1/28 - 2/3)  LATANOPROST 0.005% EYE DROPS: INSTILL 1 DROP INTO AFFECTED EYE(S) ONCE DAILY IN THE EVENING  LEVOTHYROXINE 88 MCG TABLET: TAKE 1 TABLET BY MOUTH EVERY DAY  pantoprazole 40 mg oral delayed release tablet: 1 tab(s) orally every 12 hours  ramipril 10 mg oral capsule: 1 cap(s) orally once a day   carvedilol 6.25 mg oral tablet: 1 tab(s) orally every 12 hours  ceFAZolin 2 g intravenous injection: 2 gram(s) intravenous every 8 hours 2g IV cefazolin q8hr until 2/22  fluconazole 200 mg oral tablet: 1 tab(s) orally every 24 hours Please take 1 tab daily for 14 days (1/29 - 2/12)  LATANOPROST 0.005% EYE DROPS: 1 applicator to each affected eye once a day (at bedtime) INSTILL 1 DROP INTO AFFECTED EYE(S) ONCE DAILY IN THE EVENING  LEVOTHYROXINE 88 MCG TABLET: 1 tab(s) orally once a day TAKE 1 TABLET BY MOUTH EVERY DAY  pantoprazole 40 mg oral delayed release tablet: 1 tab(s) orally every 12 hours  ramipril 10 mg oral capsule: 1 cap(s) orally once a day   carvedilol 12.5 mg oral tablet: 1 tab(s) orally 2 times a day  ceFAZolin 2 g intravenous injection: 2 gram(s) intravenous every 8 hours 2g IV cefazolin q8hr until 2/22  fluconazole 200 mg oral tablet: 1 tab(s) orally every 24 hours Please take 1 tab daily for 14 days (1/29 - 2/12)  LATANOPROST 0.005% EYE DROPS: 1 applicator to each affected eye once a day (at bedtime) INSTILL 1 DROP INTO AFFECTED EYE(S) ONCE DAILY IN THE EVENING  LEVOTHYROXINE 88 MCG TABLET: 1 tab(s) orally once a day TAKE 1 TABLET BY MOUTH EVERY DAY  pantoprazole 40 mg oral delayed release tablet: 1 tab(s) orally every 12 hours

## 2024-01-26 NOTE — PROGRESS NOTE ADULT - PROBLEM SELECTOR PLAN 6
Resolved.   P/w hypernatremia 149 s/p fall and poor PO intake x 2 days. Most likely 2/2 hypovolemia, evident w/ concentrated urine on exam.     #HAGMA - RESOLVED  #elevated serum lactate - RESOLVED  POA w/ HAGMA w/ most likely cause to be lactic acidosis. resolved w/ D5 w/ 1AMP bicarb

## 2024-01-26 NOTE — PROGRESS NOTE ADULT - PROBLEM SELECTOR PLAN 2
Alerted that pt had two episodes of dark stool on 1/24, assessed, appears dark and possibly melena.   Fecal occult blood positive.   Repeat CBC showed Hgb 11.8->12.0.   No known history of GI bleeds, patient has had a scope before (unsure exactly when) but was told it was normal.  - c/w PO [id number] mg BID  - monitor for further melena appearing stools  - GI consult for c/f gi bleed; Will consider EGD once patient is no longer on vEEG   - hold off on AC at this time. New episode of melena (3 total since admission). Hgb stable.   No known history of GI bleeds, patient has had a scope before (unsure exactly when) but was told it was normal.  - c/w PO [id number] mg BID  - GI reconsulted  - hold off on AC at this time

## 2024-01-26 NOTE — ADVANCED PRACTICE NURSE CONSULT - ASSESSMENT
Pt awake, alert, able to help turn. Incontinent of bowel and bladder (Primafit in place). Francois 15.   Of note, pt had fall at home and was found down.    Overall multiple areas of scabbing to BUE, BLE.   Bilateral gluteal and right lateral hip ecchymosis.  Appears more as ecchymosis than DTI  Bilateral heels with blanchable redness.  Right lateral malleolus and right lateral foot with 1x1cm flattened serous blisters  Left hand: 3.5x3.5cm fluctuant bullae from IV infiltrate. Scant serous drainage.

## 2024-01-26 NOTE — PROGRESS NOTE ADULT - PROBLEM SELECTOR PLAN 4
Presented s/p fall. Pt with no recollection of event (though denies falls) but multiple sites of ecchymosis suggests fall.   Etiology unclear: suspect mechanical (general deconditioning) vs orthostatic vs. possible cardiogenic run of afib (seen while on tele).  - f/u orthostatics when reconditioned  - PT -> Rec STEFANO

## 2024-01-26 NOTE — PROGRESS NOTE ADULT - PROBLEM SELECTOR PLAN 7
P/w AMS. Per family bedside voice is more hoarse than baseline. Failed dysphagia testing this admission.    SS consulted. FEES 1/23 showed severe pharyngeal dysphagia. However vocal ford found to be inadequately closing.  - aspiration precautions  - soft, bite sized diet w/ thin liquid per SS recs  - ENT recommending outpatient follow up and continue working w/ SLP  - no straws at bedside to prevent aspiration

## 2024-01-26 NOTE — DISCHARGE NOTE PROVIDER - NSDCCPTREATMENT_GEN_ALL_CORE_FT
PRINCIPAL PROCEDURE  Procedure: Endoscopy, UGI  Findings and Treatment: You had an endoscopy to evaluate your melena. You were found to have nonbleeding ulcers and esophogeal candidiasis for which we are treating you. Please continue to take pantoprazole daily after discharge.      SECONDARY PROCEDURE  Procedure: Insertion, midline catheter  Findings and Treatment: We insterted a midline catheter during your admission so you can take IV cefazoling for 4 weeks upon discharge, end date 2/22.

## 2024-01-26 NOTE — DISCHARGE NOTE PROVIDER - ATTENDING DISCHARGE PHYSICAL EXAMINATION:
General: NAD  Respiratory: CTAB; no wheezes/rales/rhonchi, normal WOB  Cardiovascular: Regular rhythm/rate, + S1/S2; no murmurs, rubs gallops   Gastrointestinal: Soft; NTND; bowel sounds hyperactive BS four quads  : no suprapubic tenderness. + primafit  Vascular: extremities WWP; no edema/cyanosis  Neurological: A&Ox3, no obvious focal deficits  Integument: Extensive ecchymosis with various stages of healing on buttocks, trunk, dorsal and ventral aspects of UE and LE B/L. Stage I Pressure ulcers on L and R buttocks and mid-thoracic region of trunk. +Swelling, warmth and erythema of L hand.   General: NAD  Respiratory: CTAB; no wheezes/rales/rhonchi, normal WOB  Cardiovascular: Regular rhythm/rate, + S1/S2; no murmurs, rubs gallops   Gastrointestinal: Soft; NTND; bowel sounds hyperactive BS four quads  : no suprapubic tenderness. + primafit  Vascular: extremities WWP; no edema/cyanosis  Neurological: A&Ox3, no obvious focal deficits  Integument: Extensive ecchymosis with various stages of healing on buttocks, trunk, dorsal and ventral aspects of UE and LE B/L. Stage I Pressure ulcers on L and R buttocks and mid-thoracic region of trunk. Right Hand s/p I&D

## 2024-01-26 NOTE — DISCHARGE NOTE PROVIDER - NPI NUMBER (FOR SYSADMIN USE ONLY) :
[[id number] [[id number] [[id number]] [[id number]],[[id number]]

## 2024-01-26 NOTE — PROGRESS NOTE ADULT - SUBJECTIVE AND OBJECTIVE BOX
GI Consult Progress Note:     OVERNIGHT EVENTS: RON.    SUBJECTIVE / INTERVAL HPI:   Patient seen and examined at bedside. Had 1 episode of melena this afternoon but had already eaten a full meal of regular food.         VITAL SIGNS:  Vital Signs Last 24 Hrs  T(C): 36.6 (26 Jan 2024 12:30), Max: 37.1 (25 Jan 2024 20:30)  T(F): 97.8 (26 Jan 2024 12:30), Max: 98.8 (25 Jan 2024 20:30)  HR: 75 (26 Jan 2024 12:30) (63 - 93)  BP: 151/69 (26 Jan 2024 12:30) (128/70 - 157/74)  BP(mean): 106 (25 Jan 2024 16:40) (106 - 106)  RR: 16 (26 Jan 2024 12:30) (16 - 17)  SpO2: 93% (26 Jan 2024 12:30) (93% - 95%)    Parameters below as of 26 Jan 2024 12:30  Patient On (Oxygen Delivery Method): room air        01-25-24 @ 07:01  -  01-26-24 @ 07:00  --------------------------------------------------------  IN: 600 mL / OUT: 100 mL / NET: 500 mL      PHYSICAL EXAM:  General: No acute distress, now off vEEG   Lungs: Normal respiratory effort and no intercostal retractions  Cardiovascular: RRR  Abdomen: Soft, non-tender, non-distended  Neurological: Alert and oriented x3  Skin: Warm and dry. No obvious rash      MEDICATIONS:  MEDICATIONS  (STANDING):  latanoprost 0.005% Ophthalmic Solution 1 Drop(s) Both EYES at bedtime  levothyroxine 88 MICROGram(s) Oral daily  metoprolol tartrate 25 milliGRAM(s) Oral every 8 hours  pantoprazole    Tablet 40 milliGRAM(s) Oral every 12 hours  piperacillin/tazobactam IVPB.. 4.5 Gram(s) IV Intermittent every 8 hours    MEDICATIONS  (PRN):  acetaminophen     Tablet .. 650 milliGRAM(s) Oral every 6 hours PRN Temp greater or equal to 38C (100.4F), Mild Pain (1 - 3)  aluminum hydroxide/magnesium hydroxide/simethicone Suspension 30 milliLiter(s) Oral every 4 hours PRN Dyspepsia  melatonin 3 milliGRAM(s) Oral at bedtime PRN Insomnia  ondansetron Injectable 4 milliGRAM(s) IV Push every 8 hours PRN Nausea and/or Vomiting      ALLERGIES:  Allergies    Flagyl (Other)    Intolerances    Augmentin (Diarrhea)  NSAIDs (Other)  dairy products (Diarrhea)  Cipro (Diarrhea)      LABS:                        11.8   12.04 )-----------( 127      ( 26 Jan 2024 12:06 )             36.1     01-26    139  |  107  |  12  ----------------------------<  97  2.9<LL>   |  21<L>  |  0.66    Ca    8.0<L>      26 Jan 2024 06:21  Phos  3.0     01-26  Mg     1.9     01-26    TPro  5.1<L>  /  Alb  2.4<L>  /  TBili  0.9  /  DBili  x   /  AST  47<H>  /  ALT  35  /  AlkPhos  82  01-26      Urinalysis Basic - ( 26 Jan 2024 06:21 )    Color: x / Appearance: x / SG: x / pH: x  Gluc: 97 mg/dL / Ketone: x  / Bili: x / Urobili: x   Blood: x / Protein: x / Nitrite: x   Leuk Esterase: x / RBC: x / WBC x   Sq Epi: x / Non Sq Epi: x / Bacteria: x      CAPILLARY BLOOD GLUCOSE  RADIOLOGY & ADDITIONAL TESTS: Reviewed.

## 2024-01-26 NOTE — PROGRESS NOTE ADULT - SUBJECTIVE AND OBJECTIVE BOX
Internal Medicine Progress Note  Una Brooks, PGY-1  Pager: [phone]    ******INCOMPLETE******    Patient is a 88y old  Female who presents with a chief complaint of syncope/fall (25 Jan 2024 15:42)    OVERNIGHT EVENTS/INTERVAL HPI:    REVIEW OF SYSTEMS:  All other review of systems is negative unless indicated above.    OBJECTIVE:  T(C): 37.1 (01-26-24 @ 05:15), Max: 37.1 (01-25-24 @ 20:30)  HR: 93 (01-26-24 @ 05:15) (63 - 93)  BP: 133/67 (01-26-24 @ 05:15) (128/70 - 160/87)  RR: 17 (01-26-24 @ 05:15) (16 - 17)  SpO2: 93% (01-26-24 @ 05:15) (92% - 95%)  Daily     Daily     Physical Exam:  General: in no acute distress  Eyes: EOMI intact bilaterally. Anicteric sclerae, moist conjunctivae  HENT: Moist mucous membranes  Neck: Trachea midline, supple  Lungs: CTA B/L. No wheezes, rales, or rhonchi  Cardiovascular: RRR. No murmurs, rubs, or gallops  Abdomen: Soft, non-tender non-distended; No rebound or guarding  Extremities: WWP, No clubbing, cyanosis or edema  MSK: No midline bony tenderness. No CVA tenderness bilaterally  Neurological: Alert and oriented x3  Skin: Warm and dry. No obvious rash     Medications:  MEDICATIONS  (STANDING):  latanoprost 0.005% Ophthalmic Solution 1 Drop(s) Both EYES at bedtime  levothyroxine 88 MICROGram(s) Oral daily  metoprolol tartrate 25 milliGRAM(s) Oral every 8 hours  pantoprazole    Tablet 40 milliGRAM(s) Oral every 12 hours  piperacillin/tazobactam IVPB.. 4.5 Gram(s) IV Intermittent every 8 hours    MEDICATIONS  (PRN):  acetaminophen     Tablet .. 650 milliGRAM(s) Oral every 6 hours PRN Temp greater or equal to 38C (100.4F), Mild Pain (1 - 3)  aluminum hydroxide/magnesium hydroxide/simethicone Suspension 30 milliLiter(s) Oral every 4 hours PRN Dyspepsia  melatonin 3 milliGRAM(s) Oral at bedtime PRN Insomnia  ondansetron Injectable 4 milliGRAM(s) IV Push every 8 hours PRN Nausea and/or Vomiting      Labs:                        11.5   10.49 )-----------( 127      ( 26 Jan 2024 06:21 )             34.9     01-25    143  |  112<H>  |  19  ----------------------------<  105<H>  3.4<L>   |  22  |  0.72    Ca    8.1<L>      25 Jan 2024 05:30  Phos  2.0     01-25  Mg     2.0     01-25    TPro  5.2<L>  /  Alb  2.2<L>  /  TBili  0.9  /  DBili  x   /  AST  59<H>  /  ALT  37  /  AlkPhos  88  01-25      Urinalysis Basic - ( 25 Jan 2024 05:30 )    Color: x / Appearance: x / SG: x / pH: x  Gluc: 105 mg/dL / Ketone: x  / Bili: x / Urobili: x   Blood: x / Protein: x / Nitrite: x   Leuk Esterase: x / RBC: x / WBC x   Sq Epi: x / Non Sq Epi: x / Bacteria: x          Radiology: Reviewed Patient is a 88y old  Female who presents with a chief complaint of syncope/fall (25 Jan 2024 15:42)    OVERNIGHT EVENTS/INTERVAL HPI: No acute events overnight. Patient seen and examined at bedside this AM. Reports right hand tenderness. Otherwise no complaints. No episodes of melena overnight.     REVIEW OF SYSTEMS:  All other review of systems is negative unless indicated above.    OBJECTIVE:  T(C): 37.1 (01-26-24 @ 05:15), Max: 37.1 (01-25-24 @ 20:30)  HR: 93 (01-26-24 @ 05:15) (63 - 93)  BP: 133/67 (01-26-24 @ 05:15) (128/70 - 160/87)  RR: 17 (01-26-24 @ 05:15) (16 - 17)  SpO2: 93% (01-26-24 @ 05:15) (92% - 95%)  Daily     Daily     Physical Exam:  General: NAD  Respiratory: CTAB; no wheezes/rales/rhonchi, normal WOB  Cardiovascular: Regular rhythm/rate, + S1/S2; no murmurs, rubs gallops   Gastrointestinal: Soft; NTND; bowel sounds hyperactive BS four quads  : no suprapubic tenderness. + primafit  Vascular: extremities WWP; no edema/cyanosis  Neurological: A&Ox3, no obvious focal deficits  Integument: Extensive ecchymosis with various stages of healing on buttocks, trunk, dorsal and ventral aspects of UE and LE B/L. Stage I Pressure ulcers on L and R buttocks and mid-thoracic region of trunk. +Swelling, warmth and erythema of L hand.    Medications:  MEDICATIONS  (STANDING):  latanoprost 0.005% Ophthalmic Solution 1 Drop(s) Both EYES at bedtime  levothyroxine 88 MICROGram(s) Oral daily  metoprolol tartrate 25 milliGRAM(s) Oral every 8 hours  pantoprazole    Tablet 40 milliGRAM(s) Oral every 12 hours  piperacillin/tazobactam IVPB.. 4.5 Gram(s) IV Intermittent every 8 hours    MEDICATIONS  (PRN):  acetaminophen     Tablet .. 650 milliGRAM(s) Oral every 6 hours PRN Temp greater or equal to 38C (100.4F), Mild Pain (1 - 3)  aluminum hydroxide/magnesium hydroxide/simethicone Suspension 30 milliLiter(s) Oral every 4 hours PRN Dyspepsia  melatonin 3 milliGRAM(s) Oral at bedtime PRN Insomnia  ondansetron Injectable 4 milliGRAM(s) IV Push every 8 hours PRN Nausea and/or Vomiting      Labs:                        11.5   10.49 )-----------( 127      ( 26 Jan 2024 06:21 )             34.9     01-25    143  |  112<H>  |  19  ----------------------------<  105<H>  3.4<L>   |  22  |  0.72    Ca    8.1<L>      25 Jan 2024 05:30  Phos  2.0     01-25  Mg     2.0     01-25    TPro  5.2<L>  /  Alb  2.2<L>  /  TBili  0.9  /  DBili  x   /  AST  59<H>  /  ALT  37  /  AlkPhos  88  01-25      Urinalysis Basic - ( 25 Jan 2024 05:30 )    Color: x / Appearance: x / SG: x / pH: x  Gluc: 105 mg/dL / Ketone: x  / Bili: x / Urobili: x   Blood: x / Protein: x / Nitrite: x   Leuk Esterase: x / RBC: x / WBC x   Sq Epi: x / Non Sq Epi: x / Bacteria: x          Radiology: Reviewed Patient is a 88y old  Female who presents with a chief complaint of syncope/fall (25 Jan 2024 15:42)    OVERNIGHT EVENTS/INTERVAL HPI: No acute events overnight. Patient seen and examined at bedside this AM. Reports right hand tenderness. Otherwise no complaints. One episode of melena this AM.     REVIEW OF SYSTEMS:  All other review of systems is negative unless indicated above.    OBJECTIVE:  T(C): 37.1 (01-26-24 @ 05:15), Max: 37.1 (01-25-24 @ 20:30)  HR: 93 (01-26-24 @ 05:15) (63 - 93)  BP: 133/67 (01-26-24 @ 05:15) (128/70 - 160/87)  RR: 17 (01-26-24 @ 05:15) (16 - 17)  SpO2: 93% (01-26-24 @ 05:15) (92% - 95%)  Daily     Daily     Physical Exam:  General: NAD  Respiratory: CTAB; no wheezes/rales/rhonchi, normal WOB  Cardiovascular: Regular rhythm/rate, + S1/S2; no murmurs, rubs gallops   Gastrointestinal: Soft; NTND; bowel sounds hyperactive BS four quads  : no suprapubic tenderness. + primafit  Vascular: extremities WWP; no edema/cyanosis  Neurological: A&Ox3, no obvious focal deficits  Integument: Extensive ecchymosis with various stages of healing on buttocks, trunk, dorsal and ventral aspects of UE and LE B/L. Stage I Pressure ulcers on L and R buttocks and mid-thoracic region of trunk. +Swelling, warmth and erythema of L hand.    Medications:  MEDICATIONS  (STANDING):  latanoprost 0.005% Ophthalmic Solution 1 Drop(s) Both EYES at bedtime  levothyroxine 88 MICROGram(s) Oral daily  metoprolol tartrate 25 milliGRAM(s) Oral every 8 hours  pantoprazole    Tablet 40 milliGRAM(s) Oral every 12 hours  piperacillin/tazobactam IVPB.. 4.5 Gram(s) IV Intermittent every 8 hours    MEDICATIONS  (PRN):  acetaminophen     Tablet .. 650 milliGRAM(s) Oral every 6 hours PRN Temp greater or equal to 38C (100.4F), Mild Pain (1 - 3)  aluminum hydroxide/magnesium hydroxide/simethicone Suspension 30 milliLiter(s) Oral every 4 hours PRN Dyspepsia  melatonin 3 milliGRAM(s) Oral at bedtime PRN Insomnia  ondansetron Injectable 4 milliGRAM(s) IV Push every 8 hours PRN Nausea and/or Vomiting      Labs:                        11.5   10.49 )-----------( 127      ( 26 Jan 2024 06:21 )             34.9     01-25    143  |  112<H>  |  19  ----------------------------<  105<H>  3.4<L>   |  22  |  0.72    Ca    8.1<L>      25 Jan 2024 05:30  Phos  2.0     01-25  Mg     2.0     01-25    TPro  5.2<L>  /  Alb  2.2<L>  /  TBili  0.9  /  DBili  x   /  AST  59<H>  /  ALT  37  /  AlkPhos  88  01-25      Urinalysis Basic - ( 25 Jan 2024 05:30 )    Color: x / Appearance: x / SG: x / pH: x  Gluc: 105 mg/dL / Ketone: x  / Bili: x / Urobili: x   Blood: x / Protein: x / Nitrite: x   Leuk Esterase: x / RBC: x / WBC x   Sq Epi: x / Non Sq Epi: x / Bacteria: x          Radiology: Reviewed

## 2024-01-26 NOTE — PROGRESS NOTE ADULT - PROBLEM SELECTOR PLAN 9
Fluid: tolerating PO  Electrolytes: replete PRN, cautions with hypernatremia correction as above.   Nutrition: soft bite sized  DVT ppx: hold for now iso c/f gi bleed  GI ppx: none for now   Code: DNR [facility] ([facility])  Dispo: [facility]

## 2024-01-26 NOTE — PROGRESS NOTE ADULT - ATTENDING COMMENTS
Patient seen, examined, and discussed with Dr. Gamble. Agree with above. 88F with a h/o glaucoma (Latanoprost), hypothyroidism, HTN, worked up MM, presented to ED due to being found conscious on the ground in her apartment, now admitted to telemetry for work-up of syncope, altered mental status, and further monitoring. Course complicated by 4-5 episodes of melena on 01/24/24, so GI consulted for UGIB. Slight decrease in Hgb appears dilutional, but patient also reportedly had melena today. Continue PPI, plan for EGD Monday.     Remy Winters MD  Gastroenterology

## 2024-01-26 NOTE — DISCHARGE NOTE PROVIDER - HOSPITAL COURSE
#Discharge: do not delete    Patient is __ yo M/F with past medical history of _____ presented with _____, found to have _____ (one liner)    Hospital course (by problem):     Patient was discharged to: (home/[facility]/acute rehab/hospice, etc, and with what services – home health PT/RN? Home O2?)    New medications:   Changes to old medications:  Medications that were stopped:    Items to follow up as outpatient:    Physical exam at the time of discharge:       #Discharge: do not delete    89yo F with PMHx of Glaucoma, Hypothyroidism, HTN who presented from [facility] due to being found conscious on the ground in her apartment after a fall, found to meet SIRS criteria (sepsis vs. reactive) and ectopy, now admitted to telemetry for work-up of syncope, altered mental status, and further monitoring now stable for stepdown to F.    Hospital course (by problem):     #Atrial fibrillation.   #Ventricular ectopy.  No known history of Afib, HR 84. Not on on home medications as patient unaware of afib/never has been told she has afib.   It is possible patient had this prior to admission and has paroxysmal afib, versus potential sickness triggering the afib.   - CHADSVASC 3; HASBLED 1  - per cardiology, will consider trialing heparin/lovenox given MGUS/MM history, however will hold off on this at this time given concern for GI bleed  - c/w metoprolol 25mg q8 for rate control  - continued discussion regarding AC versus holding off on AC given bleeding risk    #Melena.   ·Alerted that pt had two episodes of dark stool on 1/24, assessed, appears dark and possibly melena.   Fecal occult blood positive. Hgb stable.  No known history of GI bleeds, patient has had a scope before (unsure exactly when) but was told it was normal.  - c/w PO [id number] mg BID  - monitor for further melena appearing stools  - GI f/u outpatient   - hold off on AC at this time.    #Metabolic encephalopathy.   #Fall  #Severe Sepsis   #Hypernatremia  ·  Plan: On admission, patient was altered. UTox, salicylate, acetaminophen neg. Suspected mechanical fall, but ruled out infectious vs orthostatic vs cardiogenic causes.  CTH: neg for acute pathology. neurological causes unlikely. RVP and UA negative.   - vEEG r/o seizure  - Hypernatremia resolved  - Empiric Abx: Zosyn 4.5 q8, 7 day course 1/22-1/29.    #Dysphagia.   Per family bedside voice is more hoarse than baseline. Failed dysphagia testing this admission.    SS consulted. FEES 1/23 showed severe pharyngeal dysphagia. However vocal ford found to be inadequately closing.  - aspiration precautions  - soft, bite sized diet w/ thin liquid per SS recs  - ENT recommending outpatient follow up and continue working w/ SLP    #Hypothyroidism.   TSH elevated at 8.727. FT4 WNL. Subclinical.  - continue home Levothyroxine 88mcg PO    #Glaucoma  - continue home med Latanoprost 0.005%    #HTN  - holding Home med Ramipril 10 i/s/o hypovolemia.      Patient was discharged to: Home    New medications: pantoprazole, lopressor and   Changes to old medications:  Medications that were stopped:    Items to follow up as outpatient: F/u w/ GI, ENT and cardiology     Physical exam at the time of discharge:  General: NAD  Respiratory: CTAB; no wheezes/rales/rhonchi, normal WOB  Cardiovascular: Regular rhythm/rate, + S1/S2; no murmurs, rubs gallops   Gastrointestinal: Soft; NTND; bowel sounds hyperactive BS four quads  : no suprapubic tenderness. + primafit  Vascular: extremities WWP; no edema/cyanosis  Neurological: A&Ox3, no obvious focal deficits  Integument: Extensive ecchymosis with various stages of healing on buttocks, trunk, dorsal and ventral aspects of UE and LE B/L. Stage I Pressure ulcers on L and R buttocks and mid-thoracic region of trunk. +Swelling, warmth and erythema of L hand.       #Discharge: do not delete    87yo F with PMHx of Glaucoma, Hypothyroidism, HTN who presented from [facility] due to being found conscious on the ground in her apartment after a fall, found to meet SIRS criteria (sepsis vs. reactive) and ectopy, now admitted to telemetry for work-up of syncope, altered mental status, and further monitoring now stable for stepdown to F.    Hospital course (by problem):     #Atrial fibrillation.   #Ventricular ectopy.  No known history of Afib, HR 84. Not on on home medications as patient unaware of afib/never has been told she has afib.   It is possible patient had this prior to admission and has paroxysmal afib, versus potential sickness triggering the afib.   - CHADSVASC 3; HASBLED 1  - per cardiology, will consider trialing heparin/lovenox given MGUS/MM history, however will hold off on this at this time given concern for GI bleed  - c/w metoprolol 25mg q8 for rate control  - continued discussion regarding AC versus holding off on AC given bleeding risk    #Melena.   ·Alerted that pt had two episodes of dark stool on 1/24, assessed, appears dark and possibly melena.   Fecal occult blood positive. Hgb stable.  No known history of GI bleeds, patient has had a scope before (unsure exactly when) but was told it was normal.  - c/w PO [id number] mg BID  - monitor for further melena appearing stools  - GI f/u outpatient   - hold off on AC at this time.    #Metabolic encephalopathy.   #Fall  #Severe Sepsis   #Hypernatremia  ·  Plan: On admission, patient was altered. UTox, salicylate, acetaminophen neg. Suspected mechanical fall, but ruled out infectious vs orthostatic vs cardiogenic causes.  CTH: neg for acute pathology. neurological causes unlikely. RVP and UA negative.   - vEEG r/o seizure  - Hypernatremia resolved  - Empiric Abx: Zosyn 4.5 q8, 7 day course 1/22-1/29.    #Dysphagia.   Per family bedside voice is more hoarse than baseline. Failed dysphagia testing this admission.    SS consulted. FEES 1/23 showed severe pharyngeal dysphagia. However vocal ford found to be inadequately closing.  - aspiration precautions  - soft, bite sized diet w/ thin liquid per SS recs  - ENT recommending outpatient follow up and continue working w/ SLP    #Hypothyroidism.   TSH elevated at 8.727. FT4 WNL. Subclinical.  - continue home Levothyroxine 88mcg PO    #Glaucoma  - continue home med Latanoprost 0.005%    #HTN  - restart Home med Ramipril 10 on discharge      Patient was discharged to: Home    New medications: pantoprazole, lopressor and   Changes to old medications:  Medications that were stopped:    Items to follow up as outpatient: F/u w/ GI, ENT and cardiology     Physical exam at the time of discharge:  General: NAD  Respiratory: CTAB; no wheezes/rales/rhonchi, normal WOB  Cardiovascular: Regular rhythm/rate, + S1/S2; no murmurs, rubs gallops   Gastrointestinal: Soft; NTND; bowel sounds hyperactive BS four quads  : no suprapubic tenderness. + primafit  Vascular: extremities WWP; no edema/cyanosis  Neurological: A&Ox3, no obvious focal deficits  Integument: Extensive ecchymosis with various stages of healing on buttocks, trunk, dorsal and ventral aspects of UE and LE B/L. Stage I Pressure ulcers on L and R buttocks and mid-thoracic region of trunk. +Swelling, warmth and erythema of L hand.       #Discharge: do not delete    89yo F with PMHx of Glaucoma, Hypothyroidism, HTN who presented from [facility] due to being found conscious on the ground in her apartment after a fall, found to meet SIRS criteria (sepsis vs. reactive) and ectopy, now admitted to telemetry for work-up of syncope, altered mental status, and further monitoring now stable for stepdown to [facility].    Hospital course (by problem):   #Esophogeal candidiasis  Found to have on EGD   - Fluconazole 200 mg qd 14 days (1/29 - 2/12)    #Atrial fibrillation.   #Ventricular ectopy.  No known history of Afib, HR 84. Not on on home medications as patient unaware of afib/never has been told she has afib.   It is possible patient had this prior to admission and has paroxysmal afib, versus potential sickness triggering the afib.   - CHADSVASC 3; HASBLED 1  - per cardiology, will consider trialing heparin/lovenox given MGUS/MM history, however will hold off on this at this time given concern for GI bleed  - c/w coreg 6.25 mg BID    #MSSA abscess   #Tensynovitis   CT hand found to have tensynovitis. Cultures grew MSSA. S/p I&D with orthopedic surgery.   - Continue cefazolin 2g q8hr for 14 days (1/27 - 2/22)    #Melena.   #nonbleeding ulcers  ·Alerted that pt had two episodes of dark stool on 1/24, assessed, appears dark and possibly melena.   Fecal occult blood positive. Hgb stable.  EGD 1/29 found to have nonbleeding ulcers  - c/w PO [id number] mg qd outpatient   - GI f/u outpatient     #Metabolic encephalopathy.   #Fall  #Severe Sepsis   #Hypernatremia  On admission, patient was altered. UTox, salicylate, acetaminophen neg. Suspected mechanical fall, but ruled out infectious vs orthostatic vs cardiogenic causes.  CTH: neg for acute pathology. neurological causes unlikely. RVP and UA negative.   - vEEG r/o seizure  - Hypernatremia resolved  - Empiric Abx: Zosyn 4.5 q8, 7 day course 1/22-1/29.    #Dysphagia.   Per family bedside voice is more hoarse than baseline. Failed dysphagia testing this admission.    SS consulted. FEES 1/23 showed severe pharyngeal dysphagia. However vocal ford found to be inadequately closing.  - aspiration precautions  - soft, bite sized diet w/ thin liquid per SS recs  - ENT follow up outpatient     #Hypothyroidism.   TSH elevated at 8.727. FT4 WNL. Subclinical.  - continue home Levothyroxine 88mcg PO    #Glaucoma  - continue home med Latanoprost 0.005%    #HTN  - restart Home med Ramipril 10 on discharge      Patient was discharged to: Home    New medications: pantoprazole, lopressor and   Changes to old medications:  Medications that were stopped:    Items to follow up as outpatient: F/u w/ GI, ENT and cardiology     Physical exam at the time of discharge:  General: NAD  Respiratory: CTAB; no wheezes/rales/rhonchi, normal WOB  Cardiovascular: Regular rhythm/rate, + S1/S2; no murmurs, rubs gallops   Gastrointestinal: Soft; NTND; bowel sounds hyperactive BS four quads  : no suprapubic tenderness. + primafit  Vascular: extremities WWP; no edema/cyanosis  Neurological: A&Ox3, no obvious focal deficits  Integument: Extensive ecchymosis with various stages of healing on buttocks, trunk, dorsal and ventral aspects of UE and LE B/L. Stage I Pressure ulcers on L and R buttocks and mid-thoracic region of trunk. +Swelling, warmth and erythema of L hand.       #Discharge: do not delete    87yo F with PMHx of Glaucoma, Hypothyroidism, HTN who presented from [facility] due to being found conscious on the ground in her apartment after a fall, found to meet SIRS criteria (sepsis vs. reactive) and ectopy, now admitted to telemetry for work-up of syncope, altered mental status, and further monitoring now stable for stepdown to F.    Hospital course (by problem):   #Esophogeal candidiasis  Found to have on EGD   - Fluconazole 200 mg qd 14 days (1/29 - 2/12)    #Atrial fibrillation.   #Ventricular ectopy.  No known history of Afib, HR 84. Not on on home medications as patient unaware of afib/never has been told she has afib.   It is possible patient had this prior to admission and has paroxysmal afib, versus potential sickness triggering the afib.   - CHADSVASC 3; HASBLED 1  - per cardiology, will consider trialing heparin/lovenox given MGUS/MM history, however will hold off on this at this time given concern for GI bleed  - c/w coreg 6.25 mg BID    #MSSA abscess of left hand  #Tenosynovitis   CT hand found to have tenosynovitis. Cultures grew MSSA. S/p I&D with orthopedic surgery.   - Continue cefazolin 2g q8hr for 14 days (1/27 - 2/22)    #Melena.   #nonbleeding ulcers  ·Alerted that pt had two episodes of dark stool on 1/24, assessed, appears dark and possibly melena.   Fecal occult blood positive. Hgb stable.  EGD 1/29 found to have nonbleeding ulcers  - c/w PO [id number] mg qd outpatient   - GI f/u outpatient     #Metabolic encephalopathy.   #Fall  #Severe Sepsis   #Hypernatremia  On admission, patient was altered. UTox, salicylate, acetaminophen neg. Suspected mechanical fall, but ruled out infectious vs orthostatic vs cardiogenic causes.  CTH: neg for acute pathology. neurological causes unlikely. RVP and UA negative.   - vEEG r/o seizure  - Hypernatremia resolved  - Empiric Abx: Zosyn 4.5 q8, 7 day course 1/22-1/29.    #Dysphagia.   Per family bedside voice is more hoarse than baseline. Failed dysphagia testing this admission.    SS consulted. FEES 1/23 showed severe pharyngeal dysphagia. However vocal ford found to be inadequately closing.  - aspiration precautions  - soft, bite sized diet w/ thin liquid per SS recs  - ENT follow up outpatient     #Hypothyroidism.   TSH elevated at 8.727. FT4 WNL. Subclinical.  - continue home Levothyroxine 88mcg PO    #Glaucoma  - continue home med Latanoprost 0.005%    #HTN  - restart Home med Ramipril 10 on discharge      Patient was discharged to: Home    New medications: pantoprazole, lopressor and   Changes to old medications:  Medications that were stopped:    Items to follow up as outpatient: F/u w/ GI, ENT and cardiology     Physical exam at the time of discharge:  General: NAD  Respiratory: CTAB; no wheezes/rales/rhonchi, normal WOB  Cardiovascular: Regular rhythm/rate, + S1/S2; no murmurs, rubs gallops   Gastrointestinal: Soft; NTND; bowel sounds hyperactive BS four quads  : no suprapubic tenderness. + primafit  Vascular: extremities WWP; no edema/cyanosis  Neurological: A&Ox3, no obvious focal deficits  Integument: Extensive ecchymosis with various stages of healing on buttocks, trunk, dorsal and ventral aspects of UE and LE B/L. Stage I Pressure ulcers on L and R buttocks and mid-thoracic region of trunk. +Swelling, warmth and erythema of L hand.       #Discharge: do not delete    89yo F with PMHx of Glaucoma, Hypothyroidism, HTN who presented from [facility] due to being found conscious on the ground in her apartment after a fall, found to meet SIRS criteria (sepsis vs. reactive) and ectopy, now admitted to telemetry for work-up of syncope, altered mental status, and further monitoring now stable for stepdown to F.    Hospital course (by problem):   #Esophogeal candidiasis  Found to have on EGD   - Fluconazole 200 mg qd 14 days (1/29 - 2/12)    #Atrial fibrillation.   #Ventricular ectopy.  No known history of Afib, HR 84. Not on on home medications as patient unaware of afib/never has been told she has afib.   It is possible patient had this prior to admission and has paroxysmal afib, versus potential sickness triggering the afib.   - CHADSVASC 3; HASBLED 1  - per cardiology, will consider trialing heparin/lovenox given MGUS/MM history, however will hold off on this at this time given concern for GI bleed  - Start coreg 12.5 mg BID    #MSSA abscess of left hand  #Tenosynovitis   CT hand found to have tenosynovitis. Cultures grew MSSA. S/p I&D with orthopedic surgery.   - Continue cefazolin 2g q8hr for 14 days (1/27 - 2/22)    #Melena.   #nonbleeding ulcers  ·Alerted that pt had two episodes of dark stool on 1/24, assessed, appears dark and possibly melena.   Fecal occult blood positive. Hgb stable.  EGD 1/29 found to have nonbleeding ulcers  - c/w PO [id number] mg qd outpatient   - GI f/u outpatient     #Metabolic encephalopathy.   #Fall  #Severe Sepsis   #Hypernatremia  On admission, patient was altered. UTox, salicylate, acetaminophen neg. Suspected mechanical fall, but ruled out infectious vs orthostatic vs cardiogenic causes.  CTH: neg for acute pathology. neurological causes unlikely. RVP and UA negative.   - vEEG r/o seizure  - Hypernatremia resolved  - Empiric Abx: Zosyn 4.5 q8, 7 day course 1/22-1/29.    #Dysphagia.   Per family bedside voice is more hoarse than baseline. Failed dysphagia testing this admission.    SS consulted. FEES 1/23 showed severe pharyngeal dysphagia. However vocal ford found to be inadequately closing.  - aspiration precautions  - soft, bite sized diet w/ thin liquid per SS recs  - ENT follow up outpatient     #Hypothyroidism.   TSH elevated at 8.727. FT4 WNL. Subclinical.  - continue home Levothyroxine 88mcg PO    #Glaucoma  - continue home med Latanoprost 0.005%    #HTN  - Discontinue med Ramipril 10 on discharge      Patient was discharged to: Home    New medications: pantoprazole, lopressor and   Changes to old medications:  Medications that were stopped:    Items to follow up as outpatient: F/u w/ GI, ENT and cardiology     Physical exam at the time of discharge:  General: NAD  Respiratory: CTAB; no wheezes/rales/rhonchi, normal WOB  Cardiovascular: Regular rhythm/rate, + S1/S2; no murmurs, rubs gallops   Gastrointestinal: Soft; NTND; bowel sounds hyperactive BS four quads  : no suprapubic tenderness. + primafit  Vascular: extremities WWP; no edema/cyanosis  Neurological: A&Ox3, no obvious focal deficits  Integument: Extensive ecchymosis with various stages of healing on buttocks, trunk, dorsal and ventral aspects of UE and LE B/L. Stage I Pressure ulcers on L and R buttocks and mid-thoracic region of trunk. +Swelling, warmth and erythema of L hand.       #Discharge: do not delete    89yo F with PMHx of Glaucoma, Hypothyroidism, HTN who presented from [facility] due to being found conscious on the ground in her apartment after a fall, found to meet SIRS criteria (sepsis vs. reactive) and ectopy, now admitted to telemetry for work-up of syncope, altered mental status, and further monitoring now stable for stepdown to [facility].    Hospital course (by problem):   #Esophogeal candidiasis  Found to have on EGD   - Fluconazole 200 mg qd 14 days (1/29 - 2/12)    #Atrial fibrillation.   #Ventricular ectopy.  No known history of Afib, HR 84. Not on on home medications as patient unaware of afib/never has been told she has afib.   It is possible patient had this prior to admission and has paroxysmal afib, versus potential sickness triggering the afib.   - CHADSVASC 3; HASBLED 1  - Detailed discussion with family regarding AC, and family decided to hold off for now; recommending outpatient f/u to continue discussion   - Start coreg 12.5 mg BID    #MSSA abscess of left hand  #Tenosynovitis   CT hand found to have tenosynovitis. Cultures grew MSSA. S/p I&D with orthopedic surgery.   - Continue cefazolin 2g q8hr for 14 days (1/27 - 2/22)    #Melena.   #nonbleeding ulcers  ·Alerted that pt had two episodes of dark stool on 1/24, assessed, appears dark and possibly melena.   Fecal occult blood positive. Hgb stable.  EGD 1/29 found to have nonbleeding ulcers  - c/w PO [id number] mg qd outpatient   - GI f/u outpatient     #Metabolic encephalopathy.   #Fall  #Severe Sepsis   #Hypernatremia  On admission, patient was altered. UTox, salicylate, acetaminophen neg. Suspected mechanical fall, but ruled out infectious vs orthostatic vs cardiogenic causes.  CTH: neg for acute pathology. neurological causes unlikely. RVP and UA negative.   - vEEG r/o seizure  - Hypernatremia resolved  - Empiric Abx: Zosyn 4.5 q8, 7 day course 1/22-1/29.    #Dysphagia.   Per family bedside voice is more hoarse than baseline. Failed dysphagia testing this admission.    SS consulted. FEES 1/23 showed severe pharyngeal dysphagia. However vocal ford found to be inadequately closing.  - aspiration precautions  - soft, bite sized diet w/ thin liquid per SS recs  - ENT follow up outpatient     #Hypothyroidism.   TSH elevated at 8.727. FT4 WNL. Subclinical.  - continue home Levothyroxine 88mcg PO    #Glaucoma  - continue home med Latanoprost 0.005%    #HTN  - Discontinue med Ramipril 10 on discharge      Patient was discharged to: Home    New medications: pantoprazole, lopressor and   Changes to old medications:  Medications that were stopped:    Items to follow up as outpatient: F/u w/ GI, ENT and cardiology     Physical exam at the time of discharge:  General: NAD  Respiratory: CTAB; no wheezes/rales/rhonchi, normal WOB  Cardiovascular: Regular rhythm/rate, + S1/S2; no murmurs, rubs gallops   Gastrointestinal: Soft; NTND; bowel sounds hyperactive BS four quads  : no suprapubic tenderness. + primafit  Vascular: extremities WWP; no edema/cyanosis  Neurological: A&Ox3, no obvious focal deficits  Integument: Extensive ecchymosis with various stages of healing on buttocks, trunk, dorsal and ventral aspects of UE and LE B/L. Stage I Pressure ulcers on L and R buttocks and mid-thoracic region of trunk. +Swelling, warmth and erythema of L hand.       #Discharge: do not delete    89yo F with PMHx of Glaucoma, Hypothyroidism, HTN who presented from [facility] due to being found conscious on the ground in her apartment after a fall, found to meet SIRS criteria (sepsis vs. reactive) and ectopy, now admitted to telemetry for work-up of syncope, altered mental status, and further monitoring now stable for stepdown to [facility].    Hospital course (by problem):   #Esophogeal candidiasis  Found to have on EGD   - Fluconazole 200 mg qd 14 days (1/29 - 2/12)    #Atrial fibrillation.   #Ventricular ectopy.  No known history of Afib, HR 84. Not on on home medications as patient unaware of afib/never has been told she has afib.   It is possible patient had this prior to admission and has paroxysmal afib, versus potential sickness triggering the afib.   - CHADSVASC 3; HASBLED 1  - Detailed discussion with family regarding AC, and family decided to hold off for now; recommending outpatient f/u to continue discussion   - Start coreg 12.5 mg BID    #MSSA abscess of left hand  #Tenosynovitis   CT hand found to have tenosynovitis. Cultures grew MSSA. S/p I&D with orthopedic surgery.   - Continue cefazolin 2g q8hr for 14 days (1/27 - 2/22)    #Melena.   #nonbleeding ulcers  ·Alerted that pt had two episodes of dark stool on 1/24, assessed, appears dark and possibly melena.   Fecal occult blood positive. Hgb stable.  EGD 1/29 found to have nonbleeding ulcers  - c/w PO [id number] mg qd outpatient   - GI f/u outpatient     #Metabolic encephalopathy.   #Fall  #Severe Sepsis   #Hypernatremia  On admission, patient was altered. UTox, salicylate, acetaminophen neg. Suspected mechanical fall, but ruled out infectious vs orthostatic vs cardiogenic causes.  CTH: neg for acute pathology. neurological causes unlikely. RVP and UA negative.   - vEEG r/o seizure  - Hypernatremia resolved  - Empiric Abx: Zosyn 4.5 q8, 7 day course 1/22-1/29.    #Dysphagia.   Per family bedside voice is more hoarse than baseline. Failed dysphagia testing this admission.    SS consulted. FEES 1/23 showed severe pharyngeal dysphagia. However vocal ford found to be inadequately closing.  - aspiration precautions  - soft, bite sized diet w/ thin liquid per SS recs  - ENT follow up outpatient     #Hypothyroidism.   TSH elevated at 8.727. FT4 WNL. Subclinical.  - continue home Levothyroxine 88mcg PO    #Glaucoma  - continue home med Latanoprost 0.005%    #HTN  - Discontinue med Ramipril 10 on discharge      Patient was discharged to: [facility]    New medications: pantoprazole, coreg, cefazolin  Changes to old medications: NA  Medications that were stopped: NA    Items to follow up as outpatient: F/u w/ GI, ENT and cardiology     Physical exam at the time of discharge:  General: NAD  Respiratory: CTAB; no wheezes/rales/rhonchi, normal WOB  Cardiovascular: Regular rhythm/rate, + S1/S2; no murmurs, rubs gallops   Gastrointestinal: Soft; NTND; bowel sounds hyperactive BS four quads  : no suprapubic tenderness. + primafit  Vascular: extremities WWP; no edema/cyanosis  Neurological: A&Ox3, no obvious focal deficits  Integument: Extensive ecchymosis with various stages of healing on buttocks, trunk, dorsal and ventral aspects of UE and LE B/L. Stage I Pressure ulcers on L and R buttocks and mid-thoracic region of trunk. +Swelling, warmth and erythema of L hand.

## 2024-01-26 NOTE — ADVANCED PRACTICE NURSE CONSULT - REASON FOR CONSULT
WOC nurse consult to assess pressure injuries that were present on admission. Physical Therapy at the bedside. WOCN will return to assess pressure injuries. 
multiple skin care concerns    Per chart review, 89yo F with PMHx of Glaucoma, Hypothyroidism, HTN who presented from [facility] due to being found conscious on the ground in her apartment after a fall, found to meet SIRS criteria (sepsis vs. reactive) and ectopy, now admitted to telemetry for work-up of syncope, altered mental status, and further monitoring now stable for stepdown to [facility].

## 2024-01-26 NOTE — PROGRESS NOTE ADULT - PROBLEM SELECTOR PLAN 1
No known history of Afib, HR 84. Not on on home medications as patient unaware of afib/never has been told she has afib.   It is possible patient had this prior to admission and has paroxysmal afib, versus potential sickness triggering the afib.   - CHADSVASC 3; HASBLED 1  - per cardiology, will consider trialing heparin/lovenox given MGUS/MM history, however will hold off on this at this time given concern for GI bleed  - c/w metoprolol 25mg q8 for rate control  - continued discussion regarding AC versus holding off on AC given bleeding risk  - Goal HR <110 [RACE II trial]  - Consider trial of heparin gtt in AM     #Ventricular ectopy   Presented with ventricular ectopy. No previous cardiac history. Likely 2/2 electrolyte abnormalities versus contributions from afib as per above .   TTE 1/23: normal b/l ventricular systolic function. mild MR.   - per cards, w/ ectopy burden and new run of afib, increase metoprolol to 25mg tid w/ holding parameters No known history of Afib, HR 84. Not on on home medications as patient unaware of afib/never has been told she has afib.   It is possible patient had this prior to admission and has paroxysmal afib, versus potential sickness triggering the afib.   - CHADSVASC 3; HASBLED 1  - c/w metoprolol 25mg q8 for rate control  - continued discussion regarding AC versus holding off on AC given bleeding risk  - Goal HR <110 [RACE II trial]  - Consider trial of heparin gtt in AM     #Ventricular ectopy   Presented with ventricular ectopy. No previous cardiac history. Likely 2/2 electrolyte abnormalities versus contributions from afib as per above .   TTE 1/23: normal b/l ventricular systolic function. mild MR.   - per cards, w/ ectopy burden and new run of afib, increase metoprolol to 25mg tid w/ holding parameters

## 2024-01-26 NOTE — PROGRESS NOTE ADULT - ASSESSMENT
87yo F with PMHx of Glaucoma, Hypothyroidism, HTN who presented from [facility] due to being found conscious on the ground in her apartment after a fall, found to meet SIRS criteria (sepsis vs. reactive) and ectopy, now admitted to telemetry for work-up of syncope, altered mental status, and further monitoring now stable for stepdown to F.

## 2024-01-26 NOTE — DISCHARGE NOTE PROVIDER - CARE PROVIDERS DIRECT ADDRESSES
,DirectAddress_Unknown ,DirectAddress_Unknown,[email or web address] ,DirectAddress_Unknown,[email or web address],DirectAddress_Unknown ,DirectAddress_Unknown,[email or web address]

## 2024-01-26 NOTE — DISCHARGE NOTE PROVIDER - CARE PROVIDER_API CALL
Remy Winters  Gastroenterology  [address], [address]  [place] [address]  Phone: [phone]  Fax: [phone]  Scheduled Appointment: 02/12/2024 02:00 PM   Remy Winters  Gastroenterology  [address], [address]  [place] [address]  Phone: [phone]  Fax: [phone]  Scheduled Appointment: 02/12/2024 02:00 PM    Jareth Palomares  Otolaryngology  [address], [address]  [place] [address]  Phone: [phone]  Fax: [phone]  Follow Up Time:     Ugo Mcfarland  Cardiovascular Disease  [address] [place] [address], 4th [place] [address]  [place] [address]  Phone: [phone]  Fax: [phone]  Follow Up Time:    Remy Winters  Gastroenterology  [address], [address]  [place] [address]  Phone: [phone]  Fax: [phone]  Scheduled Appointment: 02/12/2024 02:00 PM    Jareth Palomares  Otolaryngology  [address], [address]  [place] [address]  Phone: [phone]  Fax: [phone]  Follow Up Time:     Ugo Mcfarland  Cardiovascular Disease  [address] [place] [address], 4th [place] [address]  [place] [address]  Phone: [phone]  Fax: [phone]  Follow Up Time:     Jericho Costa  Follow Up Time: 2 weeks   Remy Winters  Gastroenterology  [address], [address]  [place] [address]  Phone: [phone]  Fax: [phone]  Scheduled Appointment: 02/12/2024 02:00 PM    Jareth Palomares  Otolaryngology  [address], [address]  [place] [address]  Phone: [phone]  Fax: [phone]  Follow Up Time:     Ugo Mcfarland  Cardiovascular Disease  [address] [place] [address], 4th [address] [address]  Phone: [phone]  Fax: [phone]  Follow Up Time:     Ru Waldron  Orthopaedic Surgery  [address], [address]  [place] [address]  Phone: [phone]  Fax: [phone]  Follow Up Time:

## 2024-01-26 NOTE — PROGRESS NOTE ADULT - ASSESSMENT
87YO F with PMHx of HTN, HLD, PAD, Hypothyroidism, monoclonal gammopathy who presented to [facility] after being found on the ground in her apartment without syncopal event. Admitted to medicine telemetry for rhabdomyolysis, AMS and multiple electrolyte derangements. Cardiology consulted for frequent PVC's. Also found to have paroxysmal Afib.     REVIEW OF STUDIES:   No tele available for review   ECG: Sinus tachycardia with bigeminy. Normal axis. No ischemic changes  TTE 1/23: Normal LV function, no high gradients across LVOT ( 12mmHg) suggestive of LVOT obstruction, Grade 1 DD, mild MR, trace TR, PASP 32mmHg     #Asymptomatic PVC's   Normal LV function with frequent PVC's ( couplets/ bigeminy/ isolated PVC's)   She is asymptomatic at this time   Given ectopy burden, started on metoprolol. Given runs of AF, her metoprolol was increased to 25mg TID  Her PVC's were likely in the setting of electrolyte abnormalities/ rhabdomyolysis which has now resolved  Keep K > 4 and Magnesium > 2    #Paroxysmal Afib   Brief episodes of Afib noted on [address]l[place]man while patient was on tele. She has no history of AF  Continue with metoprolol as above  CHADVASC 4 ( age/ gender/ vascular disease)   Recommended heparin gtt before starting NOAC but patient has had multiple episodes of melena and is currently being worked up for GIB   AC when she can tolerate. In the future, if she is unable to tolerate AC, may consider LAAO device   If remains persistently hypertensive, will consider switching to coreg once stable from bleeding perspective     Cardiology to follow

## 2024-01-26 NOTE — DISCHARGE NOTE PROVIDER - NSDCQMERRANDS_GEN_ALL_CORE
No Dorsal Nasal Flap Text: The defect edges were debeveled with a #15 scalpel blade. Given the location of the defect and the proximity to free margins a dorsal nasal flap was deemed most appropriate. Using a sterile surgical marker, an appropriate dorsal nasal flap was drawn around the defect. The area thus outlined was incised deep to adipose tissue with a #15 scalpel blade. The skin margins were undermined to an appropriate distance in all directions utilizing iris scissors. Following this, the designed flap was carried into the primary defect and sutured into place.

## 2024-01-26 NOTE — DISCHARGE NOTE PROVIDER - PROVIDER TOKENS
PROVIDER:[TOKEN:[[id number]],SCHEDULEDAPPT:[02/12/2024],SCHEDULEDAPPTTIME:[02:00 PM]] PROVIDER:[TOKEN:[[id number]],SCHEDULEDAPPT:[02/12/2024],SCHEDULEDAPPTTIME:[02:00 PM]],PROVIDER:[TOKEN:[[id number]]],PROVIDER:[TOKEN:[[id number]]] PROVIDER:[TOKEN:[[id number]],SCHEDULEDAPPT:[02/12/2024],SCHEDULEDAPPTTIME:[02:00 PM]],PROVIDER:[TOKEN:[[id number]]],PROVIDER:[TOKEN:[[id number]]],PROVIDER:[TOKEN:[[id number]],FOLLOWUP:[2 weeks]] PROVIDER:[TOKEN:[[id number]],SCHEDULEDAPPT:[02/12/2024],SCHEDULEDAPPTTIME:[02:00 PM]],PROVIDER:[TOKEN:[[id number]]],PROVIDER:[TOKEN:[[id number]]],PROVIDER:[TOKEN:[[id number]]]

## 2024-01-26 NOTE — PROGRESS NOTE ADULT - ASSESSMENT
88F with PMH of glaucoma (Latanoprost), hypothyroidism, HTN, worked up MM, presented to ED due to being found conscious on the ground in her apartment, now admitted to telemetry for work-up of syncope, altered mental status, and further monitoring. Course complicated by 4-5 episodes of melena on 01/24/24, so GI consulted for UGIB.    Patient states she had an EGD and colonoscopy 10 or so years ago. Has remained hemodynamically stable with Hb of 12. Had another episode of melena on 01/26/24.     Recommendations:   - trend Hb and maintain active type and screen   - continue pantoprazole 40 mg PO BID   - monitor for further episodes of melena   - plan for EGD to evaluate for UGIB on Monday   - NPO except medications after midnight on Sunday     Case discussed with Dr. Winters. GI Team will continue to follow.     Jacquelyn Gamble D.O.   Gastroenterology Fellow  Weekday 7am-5pm Pager: [phone]  Weeknights/Weekend/Holiday Coverage: Please call the  for contact info   88F with PMH of glaucoma (Latanoprost), hypothyroidism, HTN, worked up MM, presented to ED due to being found conscious on the ground in her apartment, now admitted to telemetry for work-up of syncope, altered mental status, and further monitoring. Course complicated by 4-5 episodes of melena on 01/24/24, so GI consulted for UGIB.    Patient states she had an EGD and colonoscopy 10 or so years ago. Has remained hemodynamically stable with Hb of 12. Had another episode of melena on 01/26/24.     Recommendations:   - trend Hb and maintain active type and screen   - continue pantoprazole 40 mg PO BID   - monitor for further episodes of melena   - plan for EGD to evaluate for UGIB on Monday   - NPO except medications after midnight on Sunday   - if patient does not have any further episodes of bleeding, could consider EGD out-patient given HDS and Hb up-trending (now 11.8)   - if patient continues to have melena, coffee ground emesis, or BRBPR over the weekend, please plan for EGD on Monday     Case discussed with Dr. Winters. GI Team will continue to follow.     Jacquelyn Gamble D.O.   Gastroenterology Fellow  Weekday 7am-5pm Pager: [phone]  Weeknights/Weekend/Holiday Coverage: Please call the  for contact info

## 2024-01-26 NOTE — PROGRESS NOTE ADULT - SUBJECTIVE AND OBJECTIVE BOX
INTERVAL EVENTS:    PAST MEDICAL & SURGICAL HISTORY:  Diverticulitis    Osteoporosis    Hypothyroidism    HTN (hypertension)    Conway's esophagus    Depression    Glaucoma    History of cataract surgery, right        MEDICATIONS  (STANDING):  latanoprost 0.005% Ophthalmic Solution 1 Drop(s) Both EYES at bedtime  levothyroxine 88 MICROGram(s) Oral daily  metoprolol tartrate 25 milliGRAM(s) Oral every 8 hours  pantoprazole    Tablet 40 milliGRAM(s) Oral every 12 hours  piperacillin/tazobactam IVPB.. 4.5 Gram(s) IV Intermittent every 8 hours  potassium chloride    Tablet ER 40 milliEquivalent(s) Oral every 4 hours  potassium chloride  10 mEq/100 mL IVPB 10 milliEquivalent(s) IV Intermittent every 1 hour    MEDICATIONS  (PRN):  acetaminophen     Tablet .. 650 milliGRAM(s) Oral every 6 hours PRN Temp greater or equal to 38C (100.4F), Mild Pain (1 - 3)  aluminum hydroxide/magnesium hydroxide/simethicone Suspension 30 milliLiter(s) Oral every 4 hours PRN Dyspepsia  melatonin 3 milliGRAM(s) Oral at bedtime PRN Insomnia  ondansetron Injectable 4 milliGRAM(s) IV Push every 8 hours PRN Nausea and/or Vomiting    Vital Signs Last 24 Hrs  T(C): 37.1 (26 Jan 2024 05:15), Max: 37.1 (25 Jan 2024 20:30)  T(F): 98.7 (26 Jan 2024 05:15), Max: 98.8 (25 Jan 2024 20:30)  HR: 93 (26 Jan 2024 05:15) (63 - 93)  BP: 133/67 (26 Jan 2024 05:15) (128/70 - 160/87)  BP(mean): 106 (25 Jan 2024 16:40) (91 - 117)  RR: 17 (26 Jan 2024 05:15) (16 - 17)  SpO2: 93% (26 Jan 2024 05:15) (92% - 95%)    Parameters below as of 26 Jan 2024 05:15  Patient On (Oxygen Delivery Method): room air        PHYSICAL EXAM:  GEN: Awake, alert. NAD.   HEENT: NCAT, PERRL, EOMI. Mucosa moist. No JVD.  RESP: CTA b/l  CV: RRR. Normal S1/S2. No m/r/g.  ABD: Soft. NT/ND. BS+  EXT: Warm. No edema, clubbing, or cyanosis.   NEURO: AAOx3. No focal deficits.     LABS:                        11.5   10.49 )-----------( 127      ( 26 Jan 2024 06:21 )             34.9     01-26    139  |  107  |  12  ----------------------------<  97  2.9<LL>   |  21<L>  |  0.66    Ca    8.0<L>      26 Jan 2024 06:21  Phos  3.0     01-26  Mg     1.9     01-26    TPro  5.1<L>  /  Alb  2.4<L>  /  TBili  0.9  /  DBili  x   /  AST  47<H>  /  ALT  35  /  AlkPhos  82  01-26          Urinalysis Basic - ( 26 Jan 2024 06:21 )    Color: x / Appearance: x / SG: x / pH: x  Gluc: 97 mg/dL / Ketone: x  / Bili: x / Urobili: x   Blood: x / Protein: x / Nitrite: x   Leuk Esterase: x / RBC: x / WBC x   Sq Epi: x / Non Sq Epi: x / Bacteria: x      I&O's Summary    25 Jan 2024 07:01  -  26 Jan 2024 07:00  --------------------------------------------------------  IN: 600 mL / OUT: 100 mL / NET: 500 mL      RADIOLOGY & ADDITIONAL STUDIES:  TELEMETRY:  EKG:         INTERVAL EVENTS: Had an episode of melena this morning prior to rounds. Also is complaining of L hand swelling/ erythema.     PAST MEDICAL & SURGICAL HISTORY:  Diverticulitis    Osteoporosis    Hypothyroidism    HTN (hypertension)    Conway's esophagus    Depression    Glaucoma    History of cataract surgery, right        MEDICATIONS  (STANDING):  latanoprost 0.005% Ophthalmic Solution 1 Drop(s) Both EYES at bedtime  levothyroxine 88 MICROGram(s) Oral daily  metoprolol tartrate 25 milliGRAM(s) Oral every 8 hours  pantoprazole    Tablet 40 milliGRAM(s) Oral every 12 hours  piperacillin/tazobactam IVPB.. 4.5 Gram(s) IV Intermittent every 8 hours  potassium chloride    Tablet ER 40 milliEquivalent(s) Oral every 4 hours  potassium chloride  10 mEq/100 mL IVPB 10 milliEquivalent(s) IV Intermittent every 1 hour    MEDICATIONS  (PRN):  acetaminophen     Tablet .. 650 milliGRAM(s) Oral every 6 hours PRN Temp greater or equal to 38C (100.4F), Mild Pain (1 - 3)  aluminum hydroxide/magnesium hydroxide/simethicone Suspension 30 milliLiter(s) Oral every 4 hours PRN Dyspepsia  melatonin 3 milliGRAM(s) Oral at bedtime PRN Insomnia  ondansetron Injectable 4 milliGRAM(s) IV Push every 8 hours PRN Nausea and/or Vomiting    Vital Signs Last 24 Hrs  T(C): 37.1 (26 Jan 2024 05:15), Max: 37.1 (25 Jan 2024 20:30)  T(F): 98.7 (26 Jan 2024 05:15), Max: 98.8 (25 Jan 2024 20:30)  HR: 93 (26 Jan 2024 05:15) (63 - 93)  BP: 133/67 (26 Jan 2024 05:15) (128/70 - 160/87)  BP(mean): 106 (25 Jan 2024 16:40) (91 - 117)  RR: 17 (26 Jan 2024 05:15) (16 - 17)  SpO2: 93% (26 Jan 2024 05:15) (92% - 95%)    Parameters below as of 26 Jan 2024 05:15  Patient On (Oxygen Delivery Method): room air        PHYSICAL EXAM:  GEN: Awake, comfortable. NAD.   HEENT: NCAT, PERRL, EOMI. Mucosa moist. No JVD.   RESP: CTA b/l  CV: RRR, normal s1/s2. systolic murmur   ABD: Soft, NTND. BS+  EXT: Warm. No edema, clubbing, or cyanosis.     LABS:                        11.5   10.49 )-----------( 127      ( 26 Jan 2024 06:21 )             34.9     01-26    139  |  107  |  12  ----------------------------<  97  2.9<LL>   |  21<L>  |  0.66    Ca    8.0<L>      26 Jan 2024 06:21  Phos  3.0     01-26  Mg     1.9     01-26    TPro  5.1<L>  /  Alb  2.4<L>  /  TBili  0.9  /  DBili  x   /  AST  47<H>  /  ALT  35  /  AlkPhos  82  01-26          Urinalysis Basic - ( 26 Jan 2024 06:21 )    Color: x / Appearance: x / SG: x / pH: x  Gluc: 97 mg/dL / Ketone: x  / Bili: x / Urobili: x   Blood: x / Protein: x / Nitrite: x   Leuk Esterase: x / RBC: x / WBC x   Sq Epi: x / Non Sq Epi: x / Bacteria: x      I&O's Summary    25 Jan 2024 07:01  -  26 Jan 2024 07:00  --------------------------------------------------------  IN: 600 mL / OUT: 100 mL / NET: 500 mL

## 2024-01-26 NOTE — DISCHARGE NOTE PROVIDER - NSDCHC_MEDRECSTATUS_GEN_ALL_CORE
"Assessment/Plan    Preventive.  Screening tests and immunizations as noted below.  Patient declined COVID vaccine.    Obesity.  Patient has lost a significant amount of weight with lifestyle changes.  I encouraged him to continue to avoid pop.    History of elevated liver enzymes and elevated bilirubin in May 2022.  Recheck in June 2022 showed normalization of liver enzymes and total bilirubin, with a mildly elevated direct bilirubin of 0.3.  Appears that patient presented with abdominal pain and dark urine, so I suspect that he was experiencing some sort of acute hepatitis, possibly due to a virus.  Patient had negative hepatitis B and C testing at that time.  I recommended repeat hepatic panel today, but patient prefers to wait until next visit.    Labs for next visit: CMP, CBC, urinalysis.    F/u in 6 months.    ----  Chief complaint: Physical    Social History     Social History Narrative    Updated 3/23/2023: Lives with mother and brother.  Graduated high school.  Not working currently.  Has [employer] worker.     Patient reports that he recently took a test and was told that he no longer qualifies for Social Security disability.  [employer] worker is helping him with this.    Patient has been trying to lose weight.  Patient stopped consuming pop about 6 months ago.  He denies recent darkening of urine or pale stools.    Exam  /79 (BP Location: Right arm, Patient Position: Sitting, Cuff Size: Adult Large)   Pulse 67   Temp 98.5  F (36.9  C) (Temporal)   Resp 19   Ht 1.693 m (5' 6.65\")   Wt 106.6 kg (235 lb)   SpO2 100%   BMI 37.19 kg/m      Heart with regular rate and rhythm, no murmurs.  Lung fields clear to auscultation bilaterally.  Abdomen soft, nontender, no palpable masses.  " Admission Reconciliation is Completed  Discharge Reconciliation is Not Complete Admission Reconciliation is Completed  Discharge Reconciliation is Completed

## 2024-01-26 NOTE — DISCHARGE NOTE PROVIDER - DETAILS OF MALNUTRITION DIAGNOSIS/DIAGNOSES
This patient has been assessed with a concern for Malnutrition and was treated during this hospitalization for the following Nutrition diagnosis/diagnoses:     -  01/29/2024: Severe protein-calorie malnutrition  
no

## 2024-01-26 NOTE — DISCHARGE NOTE PROVIDER - NSDCCAREPROVSEEN_GEN_ALL_CORE_FT
"    SUBJECTIVE:   Augie Hancock is a 67 year old male who presents for Preventive Visit.  click delete button to remove this line now    Are you in the first 12 months of your Medicare Part B coverage?  No    Physical Health:    In general, how would you rate your overall physical health? good    Outside of work, how many days during the week do you exercise? 2-3 days/week    Outside of work, approximately how many minutes a day do you exercise?15-30 minutes    If you drink alcohol do you typically have >3 drinks per day or >7 drinks per week? No    Do you usually eat at least 4 servings of fruit and vegetables a day, include whole grains & fiber and avoid regularly eating high fat or \"junk\" foods? Yes    Do you have any problems taking medications regularly?  YES    Do you have any side effects from medications? none    Needs assistance for the following daily activities: no assistance needed    Which of the following safety concerns are present in your home?  none identified     Hearing impairment: No    In the past 6 months, have you been bothered by leaking of urine? no    Mental Health:    In general, how would you rate your overall mental or emotional health? good  PHQ-2 Score:      Do you feel safe in your environment? Yes    Have you ever done Advance Care Planning? (For example, a Health Directive, POLST, or a discussion with a medical provider or your loved ones about your wishes): Yes, advance care planning is on file.    Additional concerns to address?  YES has had a couple episodes of tunnel vision and nausea and dizziness   Fall risk:      Cognitive Screenin) Repeat 3 items (Leader, Season, Table)    2) Clock draw: NORMAL  3) 3 item recall: Recalls 3 objects  Results: 3 items recalled: COGNITIVE IMPAIRMENT LESS LIKELY    Mini-CogTM Copyright CATALINA Vaz. Licensed by the author for use in [facility]; reprinted with permission (becky@.[facility]). All rights reserved.      Do you have sleep " apnea, excessive snoring or daytime drowsiness?: yes snores         PROBLEMS TO ADD ON...presyncope feeling after working a little bit.  Same as what happened in 2016.  No clear cause.  We reviewed.  He had a stress at that time.  Orthostatics done and normal today.  See below.     Reviewed and updated as needed this visit by clinical staff  Tobacco  Allergies  Meds         Reviewed and updated as needed this visit by Provider        Social History     Tobacco Use     Smoking status: Never Smoker     Smokeless tobacco: Current User     Types: Chew     Tobacco comment: no passive exposure   Substance Use Topics     Alcohol use: No     Alcohol/week: 0.0 standard drinks                           Current providers sharing in care for this patient include:   Patient Care Team:  Jose Guadalupe Cotton MD as PCP - General  Jose Guadalupe Cotton MD as Assigned PCP    The following health maintenance items are reviewed in Epic and correct as of today:  Health Maintenance   Topic Date Due     PNEUMOCOCCAL IMMUNIZATION 65+ LOW/MEDIUM RISK (1 of 2 - PCV13) 12/10/2017     MEDICARE ANNUAL WELLNESS VISIT  10/09/2019     LIPID  10/09/2019     PHQ-2  01/01/2020     FALL RISK ASSESSMENT  05/20/2020     ADVANCE CARE PLANNING  09/21/2021     COLONOSCOPY  12/17/2022     DTAP/TDAP/TD IMMUNIZATION (3 - Td) 05/29/2025     HEPATITIS C SCREENING  Completed     INFLUENZA VACCINE  Completed     ZOSTER IMMUNIZATION  Completed     AORTIC ANEURYSM SCREENING (SYSTEM ASSIGNED)  Completed     IPV IMMUNIZATION  Aged Out     MENINGITIS IMMUNIZATION  Aged Out     Lab work is in process      ROS:  CONSTITUTIONAL: NEGATIVE for fever, chills, change in weight  INTEGUMENTARY/SKIN: NEGATIVE for worrisome rashes, moles or lesions  EYES: NEGATIVE for vision changes or irritation  ENT/MOUTH: NEGATIVE for ear, mouth and throat problems  RESP: NEGATIVE for significant cough or SOB  BREAST: NEGATIVE for masses, tenderness or discharge  CV: NEGATIVE for chest pain,  "palpitations or peripheral edema  GI: NEGATIVE for nausea, abdominal pain, heartburn, or change in bowel habits  : NEGATIVE for frequency, dysuria, or hematuria  MUSCULOSKELETAL: NEGATIVE for significant arthralgias or myalgia  NEURO: NEGATIVE for weakness, dizziness or paresthesias  ENDOCRINE: NEGATIVE for temperature intolerance, skin/hair changes  HEME: NEGATIVE for bleeding problems  PSYCHIATRIC: NEGATIVE for changes in mood or affect    OBJECTIVE:   BP 92/62 (BP Location: Right arm, Patient Position: Chair, Cuff Size: Adult Regular)   Pulse 62   Temp 98.3  F (36.8  C) (Tympanic)   Ht 1.651 m (5' 5\")   Wt 61.5 kg (135 lb 9.6 oz)   SpO2 97%   BMI 22.57 kg/m   Estimated body mass index is 22.57 kg/m  as calculated from the following:    Height as of this encounter: 1.651 m (5' 5\").    Weight as of this encounter: 61.5 kg (135 lb 9.6 oz).  EXAM:   GENERAL: healthy, alert and no distress  EYES: Eyes grossly normal to inspection, PERRL and conjunctivae and sclerae normal  HENT: ear canals and TM's normal, nose and mouth without ulcers or lesions  NECK: no adenopathy, no asymmetry, masses, or scars and thyroid normal to palpation  RESP: lungs clear to auscultation - no rales, rhonchi or wheezes  CV: regular rate and rhythm, normal S1 S2, no S3 or S4, no murmur, click or rub, no peripheral edema and peripheral pulses strong  ABDOMEN: soft, nontender, no hepatosplenomegaly, no masses and bowel sounds normal  MS: no gross musculoskeletal defects noted, no edema  SKIN: no suspicious lesions or rashes  NEURO: Normal strength and tone, mentation intact and speech normal  PSYCH: mentation appears normal, affect normal/bright    Diagnostic Test Results:  Labs reviewed in Epic    ASSESSMENT / PLAN:       ICD-10-CM    1. Routine general medical examination at a health care facility Z00.00 Lipid Profile (Chol, Trig, HDL, LDL calc)     Comprehensive metabolic panel (BMP + Alb, Alk Phos, ALT, AST, Total. Bili, TP)   2. " "Pre-syncope R55 EKG 12-lead complete w/read - (Clinic Performed)     MR Brain w/o & w Contrast     CBC with platelets and differential     TSH with free T4 reflex   3. Intermittent confusion R41.0    4. Prostate cancer (H) C61 MR Brain w/o & w Contrast   5. Other general symptoms and signs  R68.89 TSH with free T4 reflex     For the presyncope feeling he had it is very odd.  He describes talking but no one hearing him.  Had normal stress.  EKG done today normal sinus lisa.  Baseline bp is lowish as well and might contribute.  It sounds vascular (probably just baseline low bp and something causing him to be presyncopal for a brief period) but must r/o neurological.  Years between sx.  Getting MRI brain.  Full labs.  F/u routine.      COUNSELING:  Reviewed preventive health counseling, as reflected in patient instructions    Estimated body mass index is 22.57 kg/m  as calculated from the following:    Height as of this encounter: 1.651 m (5' 5\").    Weight as of this encounter: 61.5 kg (135 lb 9.6 oz).         reports that he has never smoked. His smokeless tobacco use includes chew.      Appropriate preventive services were discussed with this patient, including applicable screening as appropriate for cardiovascular disease, diabetes, osteopenia/osteoporosis, and glaucoma.  As appropriate for age/gender, discussed screening for colorectal cancer, prostate cancer, breast cancer, and cervical cancer. Checklist reviewing preventive services available has been given to the patient.    Reviewed patients plan of care and provided an AVS. The Basic Care Plan (routine screening as documented in Health Maintenance) for Augie meets the Care Plan requirement. This Care Plan has been established and reviewed with the Patient.    Counseling Resources:  ATP IV Guidelines  Pooled Cohorts Equation Calculator  Breast Cancer Risk Calculator  FRAX Risk Assessment  ICSI Preventive Guidelines  Dietary Guidelines for Americans, " 2010  USDA's MyPlate  ASA Prophylaxis  Lung CA Screening    Jose Guadalupe Cotton MD  [facility]   Luis Angel Meier

## 2024-01-26 NOTE — PROGRESS NOTE ADULT - ATTENDING COMMENTS
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 Cardiology consulted for Ectopy    REVIEW OF STUDIES:   - ECG 01/23/2024: Sinus tachycardia with bigeminy. Normal axis. No ischemic changes  - Tele 01/22-23/2024: Ventricular bigeminy and frequent couplets and PVCs  - TTE 1/23/2024: Normal LV function, no high gradients across LVOT ( 12mmHg) suggestive of LVOT obstruction, Grade 1 DD, mild MR, trace TR, PASP 32mmHg     # Ventricular Ectopy; pAfib  - Patient with no known CVD, followed by outpatient Cardiologist at [facility], Dr Clifton Elena, admitted with Rhabdomyolysis after sustaining fall with  prolonged immobilization, clinically improved  - Patient is not clear as to how she fell but denies anginal symptoms since.   - EKGs on admission reviewed showing NSR with frequent PVCS without ischemic changes  - Tele this hospitalization was notable for Freqent PVC, Ventricular bigeminy and Afib with RVR with return to NSR  - ECHO this hospitalization reviewed showing normal Biv function without RWMA and intracavitary gradient of 12 mmhG  - Cont with Lopressor to 25 mg po TID with holding parameters. Should patients' SBP remains persistently 140/90 and above, would swicth to Coreg 6.25 mg po BID with goal to uptitrate as tolerated for better BP and rate control  - Patient with CHADVASC of at least 5 placing her at higher thromboembolic risk. However patient with repeat melena episode today on 1/26   - Would recommend GI evaluation.  - Would continue to Keep K > 4 and Magnesium > 2  - Given Intracavitary gradient additionally would avoid diuretics and maintain patient euvolemic  - Of note patient LE cellulitis appear to have worsened in the last 2 days. This was communicated to the team  - Cardiology will cont to follow along with you, please call with any questions .

## 2024-01-27 ENCOUNTER — TRANSCRIPTION ENCOUNTER (OUTPATIENT)
Age: 89
End: 2024-01-27

## 2024-01-27 DIAGNOSIS — T14.8XXA OTHER INJURY OF UNSPECIFIED BODY REGION, INITIAL ENCOUNTER: ICD-10-CM

## 2024-01-27 LAB
ANION GAP SERPL CALC-SCNC: 9 MMOL/L — SIGNIFICANT CHANGE UP (ref 5–17)
ANISOCYTOSIS BLD QL: SLIGHT — SIGNIFICANT CHANGE UP
BASOPHILS # BLD AUTO: 0 K/UL — SIGNIFICANT CHANGE UP (ref 0–0.2)
BASOPHILS NFR BLD AUTO: 0 % — SIGNIFICANT CHANGE UP (ref 0–2)
BLD GP AB SCN SERPL QL: NEGATIVE — SIGNIFICANT CHANGE UP
BUN SERPL-MCNC: 9 MG/DL — SIGNIFICANT CHANGE UP (ref 7–23)
CALCIUM SERPL-MCNC: 8.1 MG/DL — LOW (ref 8.4–10.5)
CHLORIDE SERPL-SCNC: 110 MMOL/L — HIGH (ref 96–108)
CO2 SERPL-SCNC: 22 MMOL/L — SIGNIFICANT CHANGE UP (ref 22–31)
COMMENT - FLUIDS: SIGNIFICANT CHANGE UP
CREAT SERPL-MCNC: 0.65 MG/DL — SIGNIFICANT CHANGE UP (ref 0.5–1.3)
CRP SERPL-MCNC: 89.2 MG/L — HIGH (ref 0–4)
EGFR: 85 ML/MIN/1.73M2 — SIGNIFICANT CHANGE UP
EOSINOPHIL # BLD AUTO: 0.18 K/UL — SIGNIFICANT CHANGE UP (ref 0–0.5)
EOSINOPHIL # FLD: 4 % — SIGNIFICANT CHANGE UP
EOSINOPHIL NFR BLD AUTO: 1.8 % — SIGNIFICANT CHANGE UP (ref 0–6)
ERYTHROCYTE [SEDIMENTATION RATE] IN BLOOD: 29 MM/HR — HIGH
GIANT PLATELETS BLD QL SMEAR: PRESENT — SIGNIFICANT CHANGE UP
GLUCOSE SERPL-MCNC: 95 MG/DL — SIGNIFICANT CHANGE UP (ref 70–99)
GRAM STN FLD: ABNORMAL
HCT VFR BLD CALC: 36.7 % — SIGNIFICANT CHANGE UP (ref 34.5–45)
HGB BLD-MCNC: 12.2 G/DL — SIGNIFICANT CHANGE UP (ref 11.5–15.5)
LYMPHOCYTES # BLD AUTO: 1.09 K/UL — SIGNIFICANT CHANGE UP (ref 1–3.3)
LYMPHOCYTES # BLD AUTO: 10.7 % — LOW (ref 13–44)
LYMPHOCYTES # FLD: 2 % — SIGNIFICANT CHANGE UP
MAGNESIUM SERPL-MCNC: 1.9 MG/DL — SIGNIFICANT CHANGE UP (ref 1.6–2.6)
MANUAL SMEAR VERIFICATION: SIGNIFICANT CHANGE UP
MCHC RBC-ENTMCNC: 32.3 PG — SIGNIFICANT CHANGE UP (ref 27–34)
MCHC RBC-ENTMCNC: 33.2 GM/DL — SIGNIFICANT CHANGE UP (ref 32–36)
MCV RBC AUTO: 97.1 FL — SIGNIFICANT CHANGE UP (ref 80–100)
MICROCYTES BLD QL: SLIGHT — SIGNIFICANT CHANGE UP
MONOCYTES # BLD AUTO: 1.18 K/UL — HIGH (ref 0–0.9)
MONOCYTES NFR BLD AUTO: 11.6 % — SIGNIFICANT CHANGE UP (ref 2–14)
MONOS+MACROS # FLD: 3 % — SIGNIFICANT CHANGE UP
NEUTROPHILS # BLD AUTO: 7.63 K/UL — HIGH (ref 1.8–7.4)
NEUTROPHILS NFR BLD AUTO: 73.2 % — SIGNIFICANT CHANGE UP (ref 43–77)
NEUTROPHILS-BODY FLUID: 91 % — SIGNIFICANT CHANGE UP
NEUTS BAND # BLD: 1.8 % — SIGNIFICANT CHANGE UP (ref 0–8)
OVALOCYTES BLD QL SMEAR: SLIGHT — SIGNIFICANT CHANGE UP
PHOSPHATE SERPL-MCNC: 2.5 MG/DL — SIGNIFICANT CHANGE UP (ref 2.5–4.5)
PLAT MORPH BLD: ABNORMAL
PLATELET # BLD AUTO: 129 K/UL — LOW (ref 150–400)
POIKILOCYTOSIS BLD QL AUTO: SLIGHT — SIGNIFICANT CHANGE UP
POLYCHROMASIA BLD QL SMEAR: SLIGHT — SIGNIFICANT CHANGE UP
POTASSIUM SERPL-MCNC: 3.4 MMOL/L — LOW (ref 3.5–5.3)
POTASSIUM SERPL-SCNC: 3.4 MMOL/L — LOW (ref 3.5–5.3)
PROMYELOCYTES # FLD: 0.9 % — HIGH (ref 0–0)
RBC # BLD: 3.78 M/UL — LOW (ref 3.8–5.2)
RBC # FLD: 16.2 % — HIGH (ref 10.3–14.5)
RBC BLD AUTO: ABNORMAL
RH IG SCN BLD-IMP: POSITIVE — SIGNIFICANT CHANGE UP
SODIUM SERPL-SCNC: 141 MMOL/L — SIGNIFICANT CHANGE UP (ref 135–145)
SPECIMEN SOURCE FLD: SIGNIFICANT CHANGE UP
SPECIMEN SOURCE: SIGNIFICANT CHANGE UP
TOTAL NUCLEATED CELL COUNT, BODY FLUID: SIGNIFICANT CHANGE UP /UL
WBC # BLD: 10.17 K/UL — SIGNIFICANT CHANGE UP (ref 3.8–10.5)
WBC # FLD AUTO: 10.17 K/UL — SIGNIFICANT CHANGE UP (ref 3.8–10.5)

## 2024-01-27 PROCEDURE — 99233 SBSQ HOSP IP/OBS HIGH 50: CPT

## 2024-01-27 PROCEDURE — 99222 1ST HOSP IP/OBS MODERATE 55: CPT

## 2024-01-27 RX ORDER — VANCOMYCIN HCL 1 G
15 VIAL (EA) INTRAVENOUS EVERY 12 HOURS
Refills: 0 | Status: DISCONTINUED | OUTPATIENT
Start: 2024-01-27 | End: 2024-01-27

## 2024-01-27 RX ORDER — POTASSIUM CHLORIDE 20 MEQ
40 PACKET (EA) ORAL
Refills: 0 | Status: COMPLETED | OUTPATIENT
Start: 2024-01-27 | End: 2024-01-27

## 2024-01-27 RX ORDER — VANCOMYCIN HCL 1 G
1000 VIAL (EA) INTRAVENOUS EVERY 24 HOURS
Refills: 0 | Status: DISCONTINUED | OUTPATIENT
Start: 2024-01-27 | End: 2024-01-28

## 2024-01-27 RX ADMIN — Medication 40 MILLIEQUIVALENT(S): at 10:57

## 2024-01-27 RX ADMIN — PANTOPRAZOLE SODIUM 40 MILLIGRAM(S): 20 TABLET, DELAYED RELEASE ORAL at 21:42

## 2024-01-27 RX ADMIN — Medication 25 MILLIGRAM(S): at 21:42

## 2024-01-27 RX ADMIN — PIPERACILLIN AND TAZOBACTAM 25 GRAM(S): 4; .5 INJECTION, POWDER, LYOPHILIZED, FOR SOLUTION INTRAVENOUS at 21:42

## 2024-01-27 RX ADMIN — LATANOPROST 1 DROP(S): 0.05 SOLUTION/ DROPS OPHTHALMIC; TOPICAL at 21:42

## 2024-01-27 RX ADMIN — Medication 40 MILLIEQUIVALENT(S): at 13:27

## 2024-01-27 RX ADMIN — Medication 25 MILLIGRAM(S): at 13:27

## 2024-01-27 RX ADMIN — Medication 25 MILLIGRAM(S): at 06:12

## 2024-01-27 RX ADMIN — Medication 88 MICROGRAM(S): at 06:12

## 2024-01-27 RX ADMIN — Medication 10 MILLILITER(S): at 07:59

## 2024-01-27 RX ADMIN — PANTOPRAZOLE SODIUM 40 MILLIGRAM(S): 20 TABLET, DELAYED RELEASE ORAL at 10:57

## 2024-01-27 RX ADMIN — PIPERACILLIN AND TAZOBACTAM 25 GRAM(S): 4; .5 INJECTION, POWDER, LYOPHILIZED, FOR SOLUTION INTRAVENOUS at 13:27

## 2024-01-27 RX ADMIN — PIPERACILLIN AND TAZOBACTAM 25 GRAM(S): 4; .5 INJECTION, POWDER, LYOPHILIZED, FOR SOLUTION INTRAVENOUS at 06:12

## 2024-01-27 RX ADMIN — Medication 250 MILLIGRAM(S): at 10:58

## 2024-01-27 NOTE — CONSULT NOTE ADULT - SUBJECTIVE AND OBJECTIVE BOX
Orthopaedic Surgery Consult Note    For Surgeon: Ru Waldron MD    HPI:  88F L hand dominant, PMHx hypothyroidism, HTN, admitted 3 days ago after being found unconscious in apartment. Admitted to medicine due to Afib w/ RVR, c/f GI bleed & sepsis. Currently AFVSS. Had an IV to dorsum of L hand that blew out 2 days ago, and now having worsening L hand swelling / pain. Had normal L hand functioning before this. CT L hand showing rim enhancing fluid collection dorsum L hand, communicating w/ 2nd, 3rd, 4th extensor compartments, along with high-grade tearing of EIP.      Allergies  Flagyl (Other)    Intolerances  Augmentin (Diarrhea)  NSAIDs (Other)  dairy products (Diarrhea)  Cipro (Diarrhea)      PAST MEDICAL & SURGICAL HISTORY:  Hypothyroidism  HTN (hypertension)  Glaucoma  History of cataract surgery, right      MEDICATIONS  (STANDING):  latanoprost 0.005% Ophthalmic Solution 1 Drop(s) Both EYES at bedtime  levothyroxine 88 MICROGram(s) Oral daily  lidocaine 1% Injectable 10 milliLiter(s) Local Injection once  metoprolol tartrate 25 milliGRAM(s) Oral every 8 hours  pantoprazole    Tablet 40 milliGRAM(s) Oral every 12 hours  piperacillin/tazobactam IVPB.. 4.5 Gram(s) IV Intermittent every 8 hours    MEDICATIONS  (PRN):  acetaminophen     Tablet .. 650 milliGRAM(s) Oral every 6 hours PRN Temp greater or equal to 38C (100.4F), Mild Pain (1 - 3)  aluminum hydroxide/magnesium hydroxide/simethicone Suspension 30 milliLiter(s) Oral every 4 hours PRN Dyspepsia  melatonin 3 milliGRAM(s) Oral at bedtime PRN Insomnia  ondansetron Injectable 4 milliGRAM(s) IV Push every 8 hours PRN Nausea and/or Vomiting      Vital Signs Last 24 Hrs  T(C): 37.1 (26 Jan 2024 21:30), Max: 37.1 (26 Jan 2024 05:15)  T(F): 98.8 (26 Jan 2024 21:30), Max: 98.8 (26 Jan 2024 21:30)  HR: 94 (26 Jan 2024 21:30) (75 - 94)  BP: 154/70 (26 Jan 2024 21:30) (133/67 - 154/70)  BP(mean): --  RR: 18 (26 Jan 2024 21:30) (16 - 18)  SpO2: 95% (26 Jan 2024 21:30) (93% - 95%)    Parameters below as of 26 Jan 2024 21:30  Patient On (Oxygen Delivery Method): room air        Physical Exam:  -Significant erythema, swelling throughout dorsum of L hand, erythema tracks to L wrist but not forearm  -L hand fluctuant mass, not expressing pus  -SILT throughout L hand  -Pulses intact  -Extensor lag to L thumb / index finger      Labs:                 11.8   12.04 )-----------( 127      ( 26 Jan 2024 12:06 )             36.1     01-26    139  |  107  |  12  ----------------------------<  97  2.9<LL>   |  21<L>  |  0.66    Ca    8.0<L>      26 Jan 2024 06:21  Phos  3.0     01-26  Mg     1.9     01-26    TPro  5.1<L>  /  Alb  2.4<L>  /  TBili  0.9  /  DBili  x   /  AST  47<H>  /  ALT  35  /  AlkPhos  82  01-26      Imaging:   CT L hand:  -Rim enhancing fluid collection dorsum L hand, communicating w/ 2nd, 3rd, 4th extensor compartments  -EIP tear      A/P: 88F with worsening rim enhancing fluid collection along dorsum of L hand, extensor tendon involvement.     -Bedside I&D performed, showing sanguinous drainage with pus  -Daily dressing changes  -f/u I&D cultures  -IV Abx  -Recommend ID consult  -Ortho will continue to follow        -Discussed with Dr. Waldron       Ortho Pager [phone]

## 2024-01-27 NOTE — PROGRESS NOTE ADULT - SUBJECTIVE AND OBJECTIVE BOX
Ortho Note    Pt comfortable without complaints, pain controlled  Denies CP, SOB, N/V, numbness/tingling     Vital Signs Last 24 Hrs  T(C): 36.8 (01-27-24 @ 06:18), Max: 37.3 (01-27-24 @ 05:45)  T(F): 98.3 (01-27-24 @ 06:18), Max: 99.2 (01-27-24 @ 05:45)  HR: 80 (01-27-24 @ 06:18) (76 - 80)  BP: 157/70 (01-27-24 @ 06:18) (157/70 - 169/75)  BP(mean): 99 (01-27-24 @ 06:18) (99 - 99)  RR: 17 (01-27-24 @ 06:18) (17 - 18)  SpO2: 94% (01-27-24 @ 06:18) (94% - 94%)  I&O's Summary    26 Jan 2024 07:01  -  27 Jan 2024 07:00  --------------------------------------------------------  IN: 300 mL / OUT: 950 mL / NET: -650 mL        Physical Exam:  -Kerlix dressing c/d/i, wound stuffed with packing  -Stable erythema L hand  -No increased swelling  -SILT throughout L hand  -Pulses intact  -Extensor lag to L thumb / index finger                          11.8   12.04 )-----------( 127      ( 26 Jan 2024 12:06 )             36.1     01-26    139  |  107  |  12  ----------------------------<  97  2.9<LL>   |  21<L>  |  0.66    Ca    8.0<L>      26 Jan 2024 06:21  Phos  3.0     01-26  Mg     1.9     01-26    TPro  5.1<L>  /  Alb  2.4<L>  /  TBili  0.9  /  DBili  x   /  AST  47<H>  /  ALT  35  /  AlkPhos  82  01-26        A/P: 88F with worsening rim enhancing fluid collection along dorsum of L hand, extensor tendon involvement.     -Bedside I&D performed, showing sanguinous drainage with pus  -Daily dressing changes  -f/u I&D cultures, growing GP cocci in pairs  -Recommend MRSA swab and IV Abx targeted towards SSI, preferably empiric Vanc  -Recommend ID consult  -Ortho will continue to follow      Ortho Pager [phone]

## 2024-01-27 NOTE — CONSULT NOTE ADULT - CONSULT REQUESTED DATE/TIME
23-Jan-2024 13:47
24-Jan-2024 13:44
27-Jan-2024 19:57
24-Jan-2024 17:18
24-Jan-2024 05:25
27-Jan-2024 00:14

## 2024-01-27 NOTE — PROGRESS NOTE ADULT - PROBLEM SELECTOR PLAN 10
Fluid: tolerating PO  Electrolytes: replete PRN  Nutrition: soft bite sized  DVT ppx: hold for now iso c/f gi bleed  GI ppx: none for now   Code: DNR DNI ([facility])  Dispo: [place]

## 2024-01-27 NOTE — PROGRESS NOTE ADULT - ATTENDING COMMENTS
Patient seen and examined.  Agree with resident note.  Meds, labs and vitals all reviewed.  Patient with numerous comorbidities, initially presented with syncope and melena, monitored on telemetry. Stepped down to regional floors, also found to have left hand cellulitis and abscess, s/p I and D.  Placed on broad spectrum antibiotics with Vanco and Zosyn. Clinically improving. Non toxic appearing.   ID consult pending to assist with more tailored approach.

## 2024-01-27 NOTE — CONSULT NOTE ADULT - SUBJECTIVE AND OBJECTIVE BOX
HPI:  87YO F with PMHx of Glaucoma (Latanoprost), Hypothyroidism, HTN presents from [facility] due to being found conscious on the ground in her apartment. Per [facility] ED note, patient's building superintendant was concerned when patient did not  newspaper for 2 days and found patient lying awake on the floor and weak and thereafter called EMS. Patient lives alone, with no HHA, and uses cane to walk when outside but no cane/walker inside her home. Per patient, she does not recall what happened (lost consciousness), previously felt confused, but vehemently denies a fall. Her last recollection involves visiting her niece, Alba, in [place] and sitting in her living room to read. She denies any prior history of falls. She denies loss of consciousness, and remained on the floor for ~2 days.   She drinks approximately 1 oz glass of vodka once weekly, with her last drink on Friday.       Of note, upon arrival to [facility], extensive bruising/echymoses were apparent of various stages of healing dispersed throughout her buttocks, back, arms, and legs. When inquiring regarding her feeling safe, she reports yes and denies any concerns or fears of harm to her by others. She reports not having much support, as her brother passed away last year and most of her loved ones have passed away.     Otherwise, she denies any chest pain, abdominal pain, N/V/D, fever, fatigue, chills, or other pertinent Sx on ROS.     ED COURSE  ·	VITALS: T 95.6 F, /75, HR 90, RR 18, SpO2 97% on RA.  ·	LABS: WBC 28.09 (high), %Neutrophils 89.7 (high), %Lymphocytes 3.1, D-dimer 1148 (high), Troponin I 212.6 (high), Na 149 (high), Cl 114 (low), HCO3 19 (low), BUN 51 (high), Albumin 2.7 (low), Alkaline Phosphatase 129 (high),  (high), ALT 56 (high), CK 4434 (high), Pro-BNP 2629 (high), Lactate 3.7 (high). COVID/Influenza/RSV negative.  ·	IMAGING: CTPE negative. CT Hip reveals Degenerative changes of the lumbar spine and pelvis with no fractures. CT C/A/P No Contrast No definite evidence of solid visceral injury in the chest, abdomen or pelvis within the limits of this noncontrast study. CT Head and Cervical spine No Cont negative.  ·	INTERVENTIONS: Aspirin 324mg x1, Levaquin 750mg x1, 2L NS Bolus, IV Ativan 0.5mg x1. 2L NS Bolus.   (23 Jan 2024 04:52)      PAST MEDICAL & SURGICAL HISTORY:  Hypothyroidism      HTN (hypertension)      Glaucoma      History of cataract surgery, right            REVIEW OF SYSTEMS:    General:	 no weakness; no fevers, no chills  Skin/Breast: no rash  Respiratory and Thorax: no SOB, no cough  Cardiovascular:	No chest pain  Gastrointestinal:	 no nausea, vomiting , diarrhea  Genitourinary:	no dysuria, no difficulty urinating, no hematuria  Musculoskeletal:	no weakness, no joint swelling/pain  Neurological:	no focal weakness/numbness  Endocrine:	no polyuria, no polydipsia      ANTIBIOTICS:  MEDICATIONS  (STANDING):  latanoprost 0.005% Ophthalmic Solution 1 Drop(s) Both EYES at bedtime  levothyroxine 88 MICROGram(s) Oral daily  lidocaine 1% Injectable 10 milliLiter(s) Local Injection once  metoprolol tartrate 25 milliGRAM(s) Oral every 8 hours  pantoprazole    Tablet 40 milliGRAM(s) Oral every 12 hours  piperacillin/tazobactam IVPB.. 4.5 Gram(s) IV Intermittent every 8 hours  vancomycin  IVPB 1000 milliGRAM(s) IV Intermittent every 24 hours    MEDICATIONS  (PRN):  acetaminophen     Tablet .. 650 milliGRAM(s) Oral every 6 hours PRN Temp greater or equal to 38C (100.4F), Mild Pain (1 - 3)  aluminum hydroxide/magnesium hydroxide/simethicone Suspension 30 milliLiter(s) Oral every 4 hours PRN Dyspepsia  melatonin 3 milliGRAM(s) Oral at bedtime PRN Insomnia  ondansetron Injectable 4 milliGRAM(s) IV Push every 8 hours PRN Nausea and/or Vomiting      Allergies    Flagyl (Other)    Intolerances    Augmentin (Diarrhea)  NSAIDs (Other)  dairy products (Diarrhea)  Cipro (Diarrhea)      SOCIAL HISTORY:    FAMILY HISTORY:      Vital Signs Last 24 Hrs  T(C): 36.6 (27 Jan 2024 11:42), Max: 37.3 (27 Jan 2024 05:45)  T(F): 97.9 (27 Jan 2024 11:42), Max: 99.2 (27 Jan 2024 05:45)  HR: 78 (27 Jan 2024 13:10) (73 - 94)  BP: 156/80 (27 Jan 2024 13:10) (154/70 - 169/75)  BP(mean): 99 (27 Jan 2024 06:18) (99 - 99)  RR: 18 (27 Jan 2024 13:10) (17 - 18)  SpO2: 94% (27 Jan 2024 13:10) (94% - 95%)    Parameters below as of 27 Jan 2024 13:10  Patient On (Oxygen Delivery Method): room air        01-26-24 @ 07:01  -  01-27-24 @ 07:00  --------------------------------------------------------  IN: 300 mL / OUT: 950 mL / NET: -650 mL        PHYSICAL EXAM:  Constitutional:Well-developed, well nourished  Eyes:KAY, EOMI  Ear/Nose/Throat: no oral lesion, no sinus tenderness on percussion	  Neck:no JVD, no lymphadenopathy, supple  Respiratory: CTA priscila  Cardiovascular: S1S2 RRR, no murmurs  Gastrointestinal:soft, (+) BS, no HSM  Extremities:no e/e/c  Vascular: DP Pulse:	right normal; left normal            LABS:                        12.2   10.17 )-----------( 129      ( 27 Jan 2024 05:30 )             36.7     01-27    141  |  110<H>  |  9   ----------------------------<  95  3.4<L>   |  22  |  0.65    Ca    8.1<L>      27 Jan 2024 05:30  Phos  2.5     01-27  Mg     1.9     01-27    TPro  5.1<L>  /  Alb  2.4<L>  /  TBili  0.9  /  DBili  x   /  AST  47<H>  /  ALT  35  /  AlkPhos  82  01-26      Urinalysis Basic - ( 27 Jan 2024 05:30 )    Color: x / Appearance: x / SG: x / pH: x  Gluc: 95 mg/dL / Ketone: x  / Bili: x / Urobili: x   Blood: x / Protein: x / Nitrite: x   Leuk Esterase: x / RBC: x / WBC x   Sq Epi: x / Non Sq Epi: x / Bacteria: x        MICROBIOLOGY:  RADIOLOGY & ADDITIONAL STUDIES: HPI:  89YO F with PMHx of Glaucoma, Hypothyroidism, HTN, diverticulitis, jensen's esophagus, ?MGUS/MM presented from [facility] after she was found conscious on the ground in her apartment. Her building superintendant was concerned bec. she not  newspaper for 2 days and found patient lying awake on the floor and weak and thereafter called EMS. Per patient, she does not recall what happened (lost consciousness). Of note, upon arrival to [facility], extensive bruising/echymoses were apparent of various stages of healing dispersed throughout her buttocks, back, arms, and legs.   Denies chest pain, abdominal pain, N/V/D, fever, fatigue, chills.  ED COURSE  ·	VITALS: T 95.6 F, /75, HR 90, RR 18, SpO2 97% on RA.  ·	LABS: WBC 28.09 (high), %Neutrophils 89.7 (high), %Lymphocytes 3.1, D-dimer 1148 (high), Troponin I 212.6 (high), Na 149 (high), Cl 114 (low), HCO3 19 (low), BUN 51 (high), Albumin 2.7 (low), Alkaline Phosphatase 129 (high),  (high), ALT 56 (high), CK 4434 (high), Pro-BNP 2629 (high), Lactate 3.7 (high). COVID/Influenza/RSV negative.  ·	IMAGING: CTPE negative. CT Hip reveals Degenerative changes of the lumbar spine and pelvis with no fractures. CT C/A/P No Contrast No definite evidence of solid visceral injury in the chest, abdomen or pelvis within the limits of this noncontrast study. CT Head and Cervical spine No Cont negative.  Had bedside swallow eval, FEES, rehab medicine and PT consults. Developed yanna for which she was seen by GI.   Seen by ortho today; had an IV to dorsum of L hand that blew out 2 days ago, and now having worsening L hand swelling / pain. Had normal L hand functioning before this. CT L hand showing rim enhancing fluid collection dorsum L hand, communicating w/ 2nd, 3rd, 4th extensor compartments, along with high-grade tearing of EIP.  We are being consulted for Tenosynovitis. 1/22 BCx ngtd. 1/26 abscess fluid WBC +++, GPC in pairs on gram stain, moderate WBCs.   	    PAST MEDICAL & SURGICAL HISTORY:  Hypothyroidism      HTN (hypertension)      Glaucoma      History of cataract surgery, right              REVIEW OF SYSTEMS:    General:	 no weakness; no fevers, + chills  Skin/Breast: no rash  Respiratory and Thorax: no SOB, no cough  Cardiovascular:	No chest pain  Gastrointestinal:	 no nausea, vomiting , diarrhea  Genitourinary:	no dysuria, no difficulty urinating, no hematuria  Musculoskeletal:	no weakness, no joint swelling/pain  Neurological:	no focal weakness/numbness  Endocrine:	no polyuria, no polydipsia      ANTIBIOTICS: cefTRIAXone   IVPB   100 mL/Hr IV Intermittent (01-23-24 @ 04:20)    levoFLOXacin IVPB   50 mL/Hr IV Intermittent (01-22-24 @ 19:20)    piperacillin/tazobactam IVPB.   200 mL/Hr IV Intermittent (01-23-24 @ 11:15)    piperacillin/tazobactam IVPB.-   25 mL/Hr IV Intermittent (01-23-24 @ 14:04)    piperacillin/tazobactam IVPB..   25 mL/Hr IV Intermittent (01-27-24 @ 21:42)   25 mL/Hr IV Intermittent (01-27-24 @ 13:27)   25 mL/Hr IV Intermittent (01-27-24 @ 06:12)   25 mL/Hr IV Intermittent (01-26-24 @ 21:55)   25 mL/Hr IV Intermittent (01-26-24 @ 12:29)   25 mL/Hr IV Intermittent (01-26-24 @ 05:27)   25 mL/Hr IV Intermittent (01-25-24 @ 21:23)   25 mL/Hr IV Intermittent (01-25-24 @ 14:15)   25 mL/Hr IV Intermittent (01-25-24 @ 06:01)   25 mL/Hr IV Intermittent (01-24-24 @ 21:49)   25 mL/Hr IV Intermittent (01-24-24 @ 15:27)   25 mL/Hr IV Intermittent (01-24-24 @ 06:32)   25 mL/Hr IV Intermittent (01-23-24 @ 22:13)    vancomycin  IVPB   250 mL/Hr IV Intermittent (01-27-24 @ 10:58)        MEDICATIONS  (STANDING):  latanoprost 0.005% Ophthalmic Solution 1 Drop(s) Both EYES at bedtime  levothyroxine 88 MICROGram(s) Oral daily  lidocaine 1% Injectable 10 milliLiter(s) Local Injection once  metoprolol tartrate 25 milliGRAM(s) Oral every 8 hours  pantoprazole    Tablet 40 milliGRAM(s) Oral every 12 hours  piperacillin/tazobactam IVPB.. 4.5 Gram(s) IV Intermittent every 8 hours  vancomycin  IVPB 1000 milliGRAM(s) IV Intermittent every 24 hours    MEDICATIONS  (PRN):  acetaminophen     Tablet .. 650 milliGRAM(s) Oral every 6 hours PRN Temp greater or equal to 38C (100.4F), Mild Pain (1 - 3)  aluminum hydroxide/magnesium hydroxide/simethicone Suspension 30 milliLiter(s) Oral every 4 hours PRN Dyspepsia  melatonin 3 milliGRAM(s) Oral at bedtime PRN Insomnia  ondansetron Injectable 4 milliGRAM(s) IV Push every 8 hours PRN Nausea and/or Vomiting      Allergies    Flagyl (Other)    Intolerances    Augmentin (Diarrhea)  NSAIDs (Other)  dairy products (Diarrhea)  Cipro (Diarrhea)      SOCIAL HISTORY:  Resides alone in [place]. No toxic habits reported. Former psychiatric nurse.     FAMILY HISTORY: Non contributory      Vital Signs Last 24 Hrs  T(C): 36.6 (27 Jan 2024 11:42), Max: 37.3 (27 Jan 2024 05:45)  T(F): 97.9 (27 Jan 2024 11:42), Max: 99.2 (27 Jan 2024 05:45)  HR: 78 (27 Jan 2024 13:10) (73 - 94)  BP: 156/80 (27 Jan 2024 13:10) (154/70 - 169/75)  BP(mean): 99 (27 Jan 2024 06:18) (99 - 99)  RR: 18 (27 Jan 2024 13:10) (17 - 18)  SpO2: 94% (27 Jan 2024 13:10) (94% - 95%)    Parameters below as of 27 Jan 2024 13:10  Patient On (Oxygen Delivery Method): room air        01-26-24 @ 07:01  -  01-27-24 @ 07:00  --------------------------------------------------------  IN: 300 mL / OUT: 950 mL / NET: -650 mL        PHYSICAL EXAM:  Constitutional: frail, elderly female, NAD  Eyes:KAY, EOMI  Ear/Nose/Throat: no oral lesion, no sinus tenderness on percussion	  Neck:no JVD, no lymphadenopathy, supple  Respiratory: CTA priscila  Cardiovascular: S1S2 RRR, no murmurs  Gastrointestinal:soft, (+) BS, no HSM  Skin: L hand dressing      LABS:                        12.2   10.17 )-----------( 129      ( 27 Jan 2024 05:30 )             36.7     01-27    141  |  110<H>  |  9   ----------------------------<  95  3.4<L>   |  22  |  0.65    Ca    8.1<L>      27 Jan 2024 05:30  Phos  2.5     01-27  Mg     1.9     01-27    TPro  5.1<L>  /  Alb  2.4<L>  /  TBili  0.9  /  DBili  x   /  AST  47<H>  /  ALT  35  /  AlkPhos  82  01-26      Urinalysis Basic - ( 27 Jan 2024 05:30 )    Color: x / Appearance: x / SG: x / pH: x  Gluc: 95 mg/dL / Ketone: x  / Bili: x / Urobili: x   Blood: x / Protein: x / Nitrite: x   Leuk Esterase: x / RBC: x / WBC x   Sq Epi: x / Non Sq Epi: x / Bacteria: x        MICROBIOLOGY:   Culture - Body Fluid with Gram Stain (collected 26 Jan 2024 22:00)  Source: .Body Fluid hand abcess fluid  Gram Stain (27 Jan 2024 05:21):    Few Gram positive cocci in pairs    Moderate to Numerous White blood cells    Urinalysis with Rflx Culture (collected 23 Jan 2024 03:04)    Culture - Blood (collected 22 Jan 2024 18:56)  Source: .Blood Blood-Peripheral  Final Report (28 Jan 2024 01:01):    No growth at 5 days    Culture - Blood (collected 22 Jan 2024 18:56)  Source: .Blood Blood-Peripheral  Final Report (28 Jan 2024 01:01):    No growth at 5 days      RADIOLOGY & ADDITIONAL STUDIES:    ACC: [id number] EXAM:  CT HAND ONLY IC LT   ORDERED BY: PATY BUCIO     PROCEDURE DATE:  01/26/2024          INTERPRETATION:  CT HAND WITH IV CONTRAST LEFT dated 1/26/2024 5:46 PM    INDICATION: Pain and swelling    COMPARISON: Hand radiographs dated 1/23/2024    TECHNIQUE: CT imaging of the left hand and wrist was performed with   contrast. The data was reformatted in the axial, coronal, and sagittal   planes. Contrast: Isovue-370. Administered: 95 cc. Discarded: 5 cc.    FINDINGS:    OSSEOUS STRUCTURES: No cortical erosion or destruction is appreciated.   There is narrowing of the STT joint with sclerosis. Mild additional   narrowing of the carpometacarpal joints.  SYNOVIUM/ JOINT FLUID: No wrist effusion  TENDONS: Marked rim-enhancing fluid distention of the second, third, and   fourth extensor tendon compartments which extends from the distal radius   through the proximal metacarpals concerning for tenosynovitis. The   extensor tendon to the index finger is markedly diminutive and poorly   seen on this study suggestive of high-grade tearing..  MUSCLES: Normal muscles  NEUROVASCULAR STRUCTURES: There is a dorsal wrist vein which is occluded.  SUBCUTANEOUS SOFT TISSUES: There is soft tissue edema about the hand and   wrist. A fluid collection likely communicating with the fourth extensor   tendons is seen in the dorsal subcutaneous fat distending the overlying   skin. This communicating fluid collection measures approximately 2.8 x   3.6 x 6.3 cm.      IMPRESSION:    1.  Soft tissue swelling is nonspecific but can be seen with cellulitis.  2.  Rim-enhancing fluid collection communicating with the underlying   extensor tendons including the second, third, and fourth extensor   compartments. The findings are concerning for communicating infectious or   inflammatory fluid collection with associated tenosynovitis.  3.  Suspect high-grade tearing of the extensor tendon to the index finger  4.  Occluded vein along the dorsum of the wrist at the level of the fluid   collections suggestive of superficial thrombophlebitis  5.  No CT evidence for osteomyelitis.

## 2024-01-27 NOTE — PROGRESS NOTE ADULT - PROBLEM SELECTOR PLAN 3
New episode of melena (3 total since admission). Hgb stable.   No known history of GI bleeds, patient has had a scope before (unsure exactly when) but was told it was normal.  - c/w PO [id number] mg BID  - GI reconsulted - plan for EGD Monday   - hold off on AC at this time

## 2024-01-27 NOTE — CONSULT NOTE ADULT - ASSESSMENT
87 yo F with Glaucoma, Hypothyroidism, HTN, diverticulitis, jensen's esophagus, ?MGUS/MM presented from [facility]. PIV reportedly blew and that triggered ortho consult. L hand CT showed Rim-enhancing fluid collection communicating with the underlying   extensor tendons including the second, third, and fourth extensor compartments s/p bedside I&D. 1/22 BCx ngtd. 1/26 abscess fluid WBC +++, GPC in pairs on gram stain, moderate WBCs.   Suggest  - IV Vancomycin 1gm q8H  - Please check a trough level prior to 3rd dose of 1 gm  - Continue IV Piptazo  - Will tailor based on micro data  - Please repeat BCx today

## 2024-01-27 NOTE — PROGRESS NOTE ADULT - SUBJECTIVE AND OBJECTIVE BOX
Internal Medicine Progress Note  Una Brooks, PGY-1  Pager: [phone]    ******INCOMPLETE******    Patient is a 88y old  Female who presents with a chief complaint of syncope/fall (27 Jan 2024 00:14)    OVERNIGHT EVENTS/INTERVAL HPI:    REVIEW OF SYSTEMS:  All other review of systems is negative unless indicated above.    OBJECTIVE:  T(C): 36.8 (01-27-24 @ 06:18), Max: 37.3 (01-27-24 @ 05:45)  HR: 80 (01-27-24 @ 06:18) (75 - 94)  BP: 157/70 (01-27-24 @ 06:18) (151/69 - 169/75)  RR: 17 (01-27-24 @ 06:18) (16 - 18)  SpO2: 94% (01-27-24 @ 06:18) (93% - 95%)  Daily     Daily     Physical Exam:  General: in no acute distress  Eyes: EOMI intact bilaterally. Anicteric sclerae, moist conjunctivae  HENT: Moist mucous membranes  Neck: Trachea midline, supple  Lungs: CTA B/L. No wheezes, rales, or rhonchi  Cardiovascular: RRR. No murmurs, rubs, or gallops  Abdomen: Soft, non-tender non-distended; No rebound or guarding  Extremities: WWP, No clubbing, cyanosis or edema  MSK: No midline bony tenderness. No CVA tenderness bilaterally  Neurological: Alert and oriented x3  Skin: Warm and dry. No obvious rash     Medications:  MEDICATIONS  (STANDING):  latanoprost 0.005% Ophthalmic Solution 1 Drop(s) Both EYES at bedtime  levothyroxine 88 MICROGram(s) Oral daily  lidocaine 1% Injectable 10 milliLiter(s) Local Injection once  metoprolol tartrate 25 milliGRAM(s) Oral every 8 hours  pantoprazole    Tablet 40 milliGRAM(s) Oral every 12 hours  piperacillin/tazobactam IVPB.. 4.5 Gram(s) IV Intermittent every 8 hours    MEDICATIONS  (PRN):  acetaminophen     Tablet .. 650 milliGRAM(s) Oral every 6 hours PRN Temp greater or equal to 38C (100.4F), Mild Pain (1 - 3)  aluminum hydroxide/magnesium hydroxide/simethicone Suspension 30 milliLiter(s) Oral every 4 hours PRN Dyspepsia  melatonin 3 milliGRAM(s) Oral at bedtime PRN Insomnia  ondansetron Injectable 4 milliGRAM(s) IV Push every 8 hours PRN Nausea and/or Vomiting      Labs:                        11.8   12.04 )-----------( 127      ( 26 Jan 2024 12:06 )             36.1     01-26    139  |  107  |  12  ----------------------------<  97  2.9<LL>   |  21<L>  |  0.66    Ca    8.0<L>      26 Jan 2024 06:21  Phos  3.0     01-26  Mg     1.9     01-26    TPro  5.1<L>  /  Alb  2.4<L>  /  TBili  0.9  /  DBili  x   /  AST  47<H>  /  ALT  35  /  AlkPhos  82  01-26      Urinalysis Basic - ( 26 Jan 2024 06:21 )    Color: x / Appearance: x / SG: x / pH: x  Gluc: 97 mg/dL / Ketone: x  / Bili: x / Urobili: x   Blood: x / Protein: x / Nitrite: x   Leuk Esterase: x / RBC: x / WBC x   Sq Epi: x / Non Sq Epi: x / Bacteria: x          Radiology: Reviewed Patient is a 88y old  Female who presents with a chief complaint of syncope/fall (27 Jan 2024 00:14)    OVERNIGHT EVENTS/INTERVAL HPI: Bedside I&D performed, sanguinous drainage with pus, daily dressing changes, I&D cultures taken. Patient seen and examined this AM. Cultures preliminarily growing GPC, started vanc. Patient feels well. Only complains of hand pain when moving her extremity. Two episodes of melena were noted this AM. Patient denies lightheadedness, dizziness, or palpitations. HDS.    REVIEW OF SYSTEMS:  All other review of systems is negative unless indicated above.    OBJECTIVE:  T(C): 36.8 (01-27-24 @ 06:18), Max: 37.3 (01-27-24 @ 05:45)  HR: 80 (01-27-24 @ 06:18) (75 - 94)  BP: 157/70 (01-27-24 @ 06:18) (151/69 - 169/75)  RR: 17 (01-27-24 @ 06:18) (16 - 18)  SpO2: 94% (01-27-24 @ 06:18) (93% - 95%)  Daily     Daily     Physical Exam:  General: in no acute distress  Lungs: CTA B/L. No wheezes, rales, or rhonchi  Cardiovascular: RRR. No murmurs, rubs, or gallops  Abdomen: Soft, non-tender non-distended; No rebound or guarding  Extremities: WWP, No clubbing, cyanosis or edema  MSK: No midline bony tenderness. No CVA tenderness bilaterally  Neurological: Alert and oriented x3  Skin: L hand wrapped in gauze; no signs of other rash    Medications:  MEDICATIONS  (STANDING):  latanoprost 0.005% Ophthalmic Solution 1 Drop(s) Both EYES at bedtime  levothyroxine 88 MICROGram(s) Oral daily  lidocaine 1% Injectable 10 milliLiter(s) Local Injection once  metoprolol tartrate 25 milliGRAM(s) Oral every 8 hours  pantoprazole    Tablet 40 milliGRAM(s) Oral every 12 hours  piperacillin/tazobactam IVPB.. 4.5 Gram(s) IV Intermittent every 8 hours    MEDICATIONS  (PRN):  acetaminophen     Tablet .. 650 milliGRAM(s) Oral every 6 hours PRN Temp greater or equal to 38C (100.4F), Mild Pain (1 - 3)  aluminum hydroxide/magnesium hydroxide/simethicone Suspension 30 milliLiter(s) Oral every 4 hours PRN Dyspepsia  melatonin 3 milliGRAM(s) Oral at bedtime PRN Insomnia  ondansetron Injectable 4 milliGRAM(s) IV Push every 8 hours PRN Nausea and/or Vomiting      Labs:                        11.8   12.04 )-----------( 127      ( 26 Jan 2024 12:06 )             36.1     01-26    139  |  107  |  12  ----------------------------<  97  2.9<LL>   |  21<L>  |  0.66    Ca    8.0<L>      26 Jan 2024 06:21  Phos  3.0     01-26  Mg     1.9     01-26    TPro  5.1<L>  /  Alb  2.4<L>  /  TBili  0.9  /  DBili  x   /  AST  47<H>  /  ALT  35  /  AlkPhos  82  01-26      Urinalysis Basic - ( 26 Jan 2024 06:21 )    Color: x / Appearance: x / SG: x / pH: x  Gluc: 97 mg/dL / Ketone: x  / Bili: x / Urobili: x   Blood: x / Protein: x / Nitrite: x   Leuk Esterase: x / RBC: x / WBC x   Sq Epi: x / Non Sq Epi: x / Bacteria: x          Radiology: Reviewed

## 2024-01-27 NOTE — PROGRESS NOTE ADULT - ASSESSMENT
87yo F with PMHx of Glaucoma, Hypothyroidism, HTN who presented from [facility] due to being found conscious on the ground in her apartment after a fall, found to meet SIRS criteria (sepsis vs. reactive) and ectopy, now admitted to telemetry for work-up of syncope, altered mental status, and further monitoring now stable for stepdown to F.

## 2024-01-27 NOTE — PROGRESS NOTE ADULT - PROBLEM SELECTOR PLAN 1
Patient noted to have wound on L hand. On exam, hand was erythematous and swollen. There was a fluctuant mass that did not express pus. Patient was able to move extremity, but reported pain with movement. Pulses in tact. CT of hand - tenosynovitis, tearing of flexor tendon  - Ortho consulted; s/p bedside I&D  - Culture preliminarily GPC; started vancomycin 1g q24hr   - ID consulted - c/w vanc and zosyn (as discussed below) until speciation of cultures

## 2024-01-27 NOTE — PROGRESS NOTE ADULT - PROBLEM SELECTOR PLAN 2
No known history of Afib, HR 84. Not on on home medications as patient unaware of afib/never has been told she has afib.   It is possible patient had this prior to admission and has paroxysmal afib, versus potential sickness triggering the afib.   - CHADSVASC 3; HASBLED 1  - c/w metoprolol 25mg q8 for rate control  - continued discussion regarding AC versus holding off on AC given bleeding risk  - Goal HR <110 [RACE II trial]  - Consider trial of heparin gtt in AM     #Ventricular ectopy   Presented with ventricular ectopy. No previous cardiac history. Likely 2/2 electrolyte abnormalities versus contributions from afib as per above .   TTE 1/23: normal b/l ventricular systolic function. mild MR.   - per cards, w/ ectopy burden and new run of afib, increase metoprolol to 25mg tid w/ holding parameters

## 2024-01-28 LAB
ALBUMIN SERPL ELPH-MCNC: 2.6 G/DL — LOW (ref 3.3–5)
ALP SERPL-CCNC: 82 U/L — SIGNIFICANT CHANGE UP (ref 40–120)
ALT FLD-CCNC: 26 U/L — SIGNIFICANT CHANGE UP (ref 10–45)
ANION GAP SERPL CALC-SCNC: 10 MMOL/L — SIGNIFICANT CHANGE UP (ref 5–17)
ANISOCYTOSIS BLD QL: SIGNIFICANT CHANGE UP
AST SERPL-CCNC: 29 U/L — SIGNIFICANT CHANGE UP (ref 10–40)
BASOPHILS # BLD AUTO: 0 K/UL — SIGNIFICANT CHANGE UP (ref 0–0.2)
BASOPHILS NFR BLD AUTO: 0 % — SIGNIFICANT CHANGE UP (ref 0–2)
BILIRUB SERPL-MCNC: 0.7 MG/DL — SIGNIFICANT CHANGE UP (ref 0.2–1.2)
BUN SERPL-MCNC: 6 MG/DL — LOW (ref 7–23)
BURR CELLS BLD QL SMEAR: PRESENT — SIGNIFICANT CHANGE UP
CALCIUM SERPL-MCNC: 8.2 MG/DL — LOW (ref 8.4–10.5)
CHLORIDE SERPL-SCNC: 108 MMOL/L — SIGNIFICANT CHANGE UP (ref 96–108)
CO2 SERPL-SCNC: 22 MMOL/L — SIGNIFICANT CHANGE UP (ref 22–31)
CREAT SERPL-MCNC: 0.59 MG/DL — SIGNIFICANT CHANGE UP (ref 0.5–1.3)
CULTURE RESULTS: SIGNIFICANT CHANGE UP
CULTURE RESULTS: SIGNIFICANT CHANGE UP
EGFR: 87 ML/MIN/1.73M2 — SIGNIFICANT CHANGE UP
EOSINOPHIL # BLD AUTO: 0.19 K/UL — SIGNIFICANT CHANGE UP (ref 0–0.5)
EOSINOPHIL NFR BLD AUTO: 1.7 % — SIGNIFICANT CHANGE UP (ref 0–6)
GIANT PLATELETS BLD QL SMEAR: PRESENT — SIGNIFICANT CHANGE UP
GLUCOSE SERPL-MCNC: 87 MG/DL — SIGNIFICANT CHANGE UP (ref 70–99)
HCT VFR BLD CALC: 37.7 % — SIGNIFICANT CHANGE UP (ref 34.5–45)
HGB BLD-MCNC: 12.5 G/DL — SIGNIFICANT CHANGE UP (ref 11.5–15.5)
LYMPHOCYTES # BLD AUTO: 1.5 K/UL — SIGNIFICANT CHANGE UP (ref 1–3.3)
LYMPHOCYTES # BLD AUTO: 13.1 % — SIGNIFICANT CHANGE UP (ref 13–44)
MACROCYTES BLD QL: SLIGHT — SIGNIFICANT CHANGE UP
MAGNESIUM SERPL-MCNC: 1.9 MG/DL — SIGNIFICANT CHANGE UP (ref 1.6–2.6)
MANUAL SMEAR VERIFICATION: SIGNIFICANT CHANGE UP
MCHC RBC-ENTMCNC: 32.1 PG — SIGNIFICANT CHANGE UP (ref 27–34)
MCHC RBC-ENTMCNC: 33.2 GM/DL — SIGNIFICANT CHANGE UP (ref 32–36)
MCV RBC AUTO: 96.9 FL — SIGNIFICANT CHANGE UP (ref 80–100)
METAMYELOCYTES # FLD: 1.7 % — HIGH (ref 0–0)
MICROCYTES BLD QL: SIGNIFICANT CHANGE UP
MONOCYTES # BLD AUTO: 0.8 K/UL — SIGNIFICANT CHANGE UP (ref 0–0.9)
MONOCYTES NFR BLD AUTO: 7 % — SIGNIFICANT CHANGE UP (ref 2–14)
MYELOCYTES NFR BLD: 1.7 % — HIGH (ref 0–0)
NEUTROPHILS # BLD AUTO: 8.55 K/UL — HIGH (ref 1.8–7.4)
NEUTROPHILS NFR BLD AUTO: 73.1 % — SIGNIFICANT CHANGE UP (ref 43–77)
NEUTS BAND # BLD: 1.7 % — SIGNIFICANT CHANGE UP (ref 0–8)
OVALOCYTES BLD QL SMEAR: SLIGHT — SIGNIFICANT CHANGE UP
PHOSPHATE SERPL-MCNC: 2.6 MG/DL — SIGNIFICANT CHANGE UP (ref 2.5–4.5)
PLAT MORPH BLD: ABNORMAL
PLATELET # BLD AUTO: 136 K/UL — LOW (ref 150–400)
POIKILOCYTOSIS BLD QL AUTO: SLIGHT — SIGNIFICANT CHANGE UP
POLYCHROMASIA BLD QL SMEAR: SLIGHT — SIGNIFICANT CHANGE UP
POTASSIUM SERPL-MCNC: 3.5 MMOL/L — SIGNIFICANT CHANGE UP (ref 3.5–5.3)
POTASSIUM SERPL-SCNC: 3.5 MMOL/L — SIGNIFICANT CHANGE UP (ref 3.5–5.3)
PROT SERPL-MCNC: 5.1 G/DL — LOW (ref 6–8.3)
RBC # BLD: 3.89 M/UL — SIGNIFICANT CHANGE UP (ref 3.8–5.2)
RBC # FLD: 16.1 % — HIGH (ref 10.3–14.5)
RBC BLD AUTO: ABNORMAL
SODIUM SERPL-SCNC: 140 MMOL/L — SIGNIFICANT CHANGE UP (ref 135–145)
SPECIMEN SOURCE: SIGNIFICANT CHANGE UP
SPECIMEN SOURCE: SIGNIFICANT CHANGE UP
WBC # BLD: 11.43 K/UL — HIGH (ref 3.8–10.5)
WBC # FLD AUTO: 11.43 K/UL — HIGH (ref 3.8–10.5)

## 2024-01-28 PROCEDURE — 99233 SBSQ HOSP IP/OBS HIGH 50: CPT

## 2024-01-28 PROCEDURE — 99232 SBSQ HOSP IP/OBS MODERATE 35: CPT

## 2024-01-28 RX ORDER — SODIUM,POTASSIUM PHOSPHATES 278-250MG
1 POWDER IN PACKET (EA) ORAL ONCE
Refills: 0 | Status: DISCONTINUED | OUTPATIENT
Start: 2024-01-28 | End: 2024-01-28

## 2024-01-28 RX ORDER — CEFAZOLIN SODIUM 1 G
2000 VIAL (EA) INJECTION EVERY 8 HOURS
Refills: 0 | Status: DISCONTINUED | OUTPATIENT
Start: 2024-01-28 | End: 2024-01-30

## 2024-01-28 RX ORDER — POTASSIUM CHLORIDE 20 MEQ
40 PACKET (EA) ORAL ONCE
Refills: 0 | Status: COMPLETED | OUTPATIENT
Start: 2024-01-28 | End: 2024-01-28

## 2024-01-28 RX ORDER — POTASSIUM CHLORIDE 20 MEQ
40 PACKET (EA) ORAL ONCE
Refills: 0 | Status: DISCONTINUED | OUTPATIENT
Start: 2024-01-28 | End: 2024-01-28

## 2024-01-28 RX ORDER — SODIUM,POTASSIUM PHOSPHATES 278-250MG
1 POWDER IN PACKET (EA) ORAL ONCE
Refills: 0 | Status: COMPLETED | OUTPATIENT
Start: 2024-01-28 | End: 2024-01-28

## 2024-01-28 RX ORDER — CARVEDILOL PHOSPHATE 80 MG/1
6.25 CAPSULE, EXTENDED RELEASE ORAL EVERY 12 HOURS
Refills: 0 | Status: DISCONTINUED | OUTPATIENT
Start: 2024-01-28 | End: 2024-01-30

## 2024-01-28 RX ADMIN — PIPERACILLIN AND TAZOBACTAM 25 GRAM(S): 4; .5 INJECTION, POWDER, LYOPHILIZED, FOR SOLUTION INTRAVENOUS at 13:11

## 2024-01-28 RX ADMIN — Medication 25 MILLIGRAM(S): at 06:19

## 2024-01-28 RX ADMIN — Medication 250 MILLIGRAM(S): at 10:46

## 2024-01-28 RX ADMIN — Medication 40 MILLIEQUIVALENT(S): at 13:17

## 2024-01-28 RX ADMIN — PANTOPRAZOLE SODIUM 40 MILLIGRAM(S): 20 TABLET, DELAYED RELEASE ORAL at 10:46

## 2024-01-28 RX ADMIN — PANTOPRAZOLE SODIUM 40 MILLIGRAM(S): 20 TABLET, DELAYED RELEASE ORAL at 22:51

## 2024-01-28 RX ADMIN — LATANOPROST 1 DROP(S): 0.05 SOLUTION/ DROPS OPHTHALMIC; TOPICAL at 22:50

## 2024-01-28 RX ADMIN — PIPERACILLIN AND TAZOBACTAM 25 GRAM(S): 4; .5 INJECTION, POWDER, LYOPHILIZED, FOR SOLUTION INTRAVENOUS at 06:20

## 2024-01-28 RX ADMIN — Medication 88 MICROGRAM(S): at 06:19

## 2024-01-28 RX ADMIN — CARVEDILOL PHOSPHATE 6.25 MILLIGRAM(S): 80 CAPSULE, EXTENDED RELEASE ORAL at 11:15

## 2024-01-28 RX ADMIN — Medication 1 PACKET(S): at 13:19

## 2024-01-28 RX ADMIN — Medication 100 MILLIGRAM(S): at 23:06

## 2024-01-28 NOTE — PROGRESS NOTE ADULT - SUBJECTIVE AND OBJECTIVE BOX
Ortho Note    Pt comfortable without complaints, pain controlled  Denies CP, SOB, N/V, numbness/tingling     Vital Signs Last 24 Hrs  T(C): 36.6 (01-28-24 @ 11:11), Max: 36.6 (01-28-24 @ 11:11)  T(F): 97.8 (01-28-24 @ 11:11), Max: 97.8 (01-28-24 @ 11:11)  HR: 75 (01-28-24 @ 11:11) (75 - 75)  BP: 162/75 (01-28-24 @ 11:11) (162/75 - 162/75)  BP(mean): --  RR: 18 (01-28-24 @ 11:11) (18 - 18)  SpO2: 95% (01-28-24 @ 11:11) (95% - 95%)  I&O's Summary      Physical Exam:  -Kerlix dressing changed and c/d/i, unable to express any drainage from wound, wound stuffed with new packing  -Stable erythema L hand  -SILT throughout L hand  -Pulses intact  -Extensor lag to L thumb / index finger                          12.5   11.43 )-----------( 136      ( 28 Jan 2024 05:30 )             37.7     01-28    140  |  108  |  6<L>  ----------------------------<  87  3.5   |  22  |  0.59    Ca    8.2<L>      28 Jan 2024 05:30  Phos  2.6     01-28  Mg     1.9     01-28    TPro  5.1<L>  /  Alb  2.6<L>  /  TBili  0.7  /  DBili  x   /  AST  29  /  ALT  26  /  AlkPhos  82  01-28      A/P: 88F with worsening rim enhancing fluid collection along dorsum of L hand, extensor tendon involvement.     -Bedside I&D performed, showing sanguinous drainage with pus  -Daily dressing changes  -f/u I&D cultures  -c/w IV Abx  -Ortho will continue to follow    Ortho Pager [phone]

## 2024-01-28 NOTE — PROGRESS NOTE ADULT - PROBLEM SELECTOR PLAN 3
New episode of melena (3 total since admission). Hgb stable.   No known history of GI bleeds, patient has had a scope before (unsure exactly when) but was told it was normal.  - c/w PO [id number] mg BID  - GI reconsulted - plan for EGD Monday   - hold off on AC at this time

## 2024-01-28 NOTE — CHART NOTE - NSCHARTNOTEFT_GEN_A_CORE
Plan for EGD tomorrow pending clinical stability. No solids past midnight but can have clears until 5 AM.    Jeb (Nixon) MD Philippe  Gastroenterology Fellow  Pager (M-F 7a-5p): [phone]  Pager (after hours): Please call  for on-call fellow

## 2024-01-28 NOTE — PROGRESS NOTE ADULT - PROBLEM SELECTOR PLAN 2
No known history of Afib, HR 84. Not on on home medications as patient unaware of afib/never has been told she has afib.   It is possible patient had this prior to admission and has paroxysmal afib, versus potential sickness triggering the afib.   - CHADSVASC 3; HASBLED 1  - c/w metoprolol 25mg q8 for rate control  - continued discussion regarding AC versus holding off on AC given bleeding risk  - Goal HR <110 [RACE II trial]  - Patient with one more melanotic bowel movement.      #Ventricular ectopy   Presented with ventricular ectopy. No previous cardiac history. Likely 2/2 electrolyte abnormalities versus contributions from afib as per above .   TTE 1/23: normal b/l ventricular systolic function. mild MR.   - per cards, w/ ectopy burden and new run of afib, increase metoprolol to 25mg tid w/ holding parameters

## 2024-01-28 NOTE — PROGRESS NOTE ADULT - PROBLEM SELECTOR PLAN 10
Fluid: tolerating PO  Electrolytes: replete PRN  Nutrition: soft bite sized  DVT ppx: hold for now iso c/f gi bleed  GI ppx: none for now   Code: DNR DNI ([facility])  Dispo: [facility] no

## 2024-01-28 NOTE — PROGRESS NOTE ADULT - ASSESSMENT
89yo F with PMHx of Glaucoma, Hypothyroidism, HTN who presented from [facility] due to being found conscious on the ground in her apartment after a fall, found to meet SIRS criteria (sepsis vs. reactive) and ectopy, now admitted to telemetry for work-up of syncope, altered mental status, and further monitoring now stable for stepdown to F.

## 2024-01-28 NOTE — PROGRESS NOTE ADULT - SUBJECTIVE AND OBJECTIVE BOX
INTERVAL HPI/OVERNIGHT EVENTS:    CONSTITUTIONAL:  No fever, chills, night sweats  EYES:  No photophobia or visual changes  CARDIOVASCULAR:  No chest pain  RESPIRATORY:  No cough, wheezing, or SOB   GASTROINTESTINAL:  No nausea, vomiting, diarrhea, constipation, or abdominal pain  GENITOURINARY:  No frequency, urgency, dysuria or hematuria  NEUROLOGIC:  No headache, lightheadedness      ANTIBIOTICS/RELEVANT:          Vital Signs Last 24 Hrs  T(C): 36.6 (28 Jan 2024 21:37), Max: 37.3 (28 Jan 2024 06:00)  T(F): 97.9 (28 Jan 2024 21:37), Max: 99.2 (28 Jan 2024 06:00)  HR: 76 (28 Jan 2024 21:37) (75 - 76)  BP: 158/78 (28 Jan 2024 21:37) (158/78 - 164/84)  BP(mean): --  RR: 18 (28 Jan 2024 21:37) (18 - 18)  SpO2: 96% (28 Jan 2024 21:37) (95% - 96%)    Parameters below as of 28 Jan 2024 21:37  Patient On (Oxygen Delivery Method): room air        PHYSICAL EXAM:  Constitutional:  Alert  Eyes:  Sclerae anicteric, conjunctivae clear, PERRL  Ear/Nose/Throat:  No nasal exudate or sinus tenderness;  No buccal mucosal lesions, no pharyngeal erythema or exudate	  Neck:  Supple, no adenopathy  Respiratory:  Clear bilaterally  Cardiovascular:  RRR, S1S2, no murmur appreciated  Gastrointestinal:  Symmetric, normoactive BS, soft, NT, no masses, guarding or rebound.  No HSM  Extremities:  No edema      LABS:                        12.5   11.43 )-----------( 136      ( 28 Jan 2024 05:30 )             37.7         01-28    140  |  108  |  6<L>  ----------------------------<  87  3.5   |  22  |  0.59    Ca    8.2<L>      28 Jan 2024 05:30  Phos  2.6     01-28  Mg     1.9     01-28    TPro  5.1<L>  /  Alb  2.6<L>  /  TBili  0.7  /  DBili  x   /  AST  29  /  ALT  26  /  AlkPhos  82  01-28      Urinalysis Basic - ( 28 Jan 2024 05:30 )    Color: x / Appearance: x / SG: x / pH: x  Gluc: 87 mg/dL / Ketone: x  / Bili: x / Urobili: x   Blood: x / Protein: x / Nitrite: x   Leuk Esterase: x / RBC: x / WBC x   Sq Epi: x / Non Sq Epi: x / Bacteria: x        MICROBIOLOGY:        RADIOLOGY & ADDITIONAL STUDIES: INTERVAL HPI/OVERNIGHT EVENTS: Temp 99.2F, elevated BP, no fresh somatic complaints.     CONSTITUTIONAL:  No fever, chills, night sweats  EYES:  No photophobia or visual changes  CARDIOVASCULAR:  No chest pain  RESPIRATORY:  No cough, wheezing, or SOB   GASTROINTESTINAL:  No nausea, vomiting, diarrhea, constipation, or abdominal pain  GENITOURINARY:  No frequency, urgency, dysuria or hematuria  NEUROLOGIC:  No headache, lightheadedness      ANTIBIOTICS/RELEVANT:    ceFAZolin   IVPB   100 mL/Hr IV Intermittent (01-28-24 @ 23:06)    cefTRIAXone   IVPB   100 mL/Hr IV Intermittent (01-23-24 @ 04:20)    levoFLOXacin IVPB   50 mL/Hr IV Intermittent (01-22-24 @ 19:20)    piperacillin/tazobactam IVPB.   200 mL/Hr IV Intermittent (01-23-24 @ 11:15)    piperacillin/tazobactam IVPB.-   25 mL/Hr IV Intermittent (01-23-24 @ 14:04)    piperacillin/tazobactam IVPB..   25 mL/Hr IV Intermittent (01-28-24 @ 13:11)   25 mL/Hr IV Intermittent (01-28-24 @ 06:20)   25 mL/Hr IV Intermittent (01-27-24 @ 21:42)   25 mL/Hr IV Intermittent (01-27-24 @ 13:27)   25 mL/Hr IV Intermittent (01-27-24 @ 06:12)   25 mL/Hr IV Intermittent (01-26-24 @ 21:55)   25 mL/Hr IV Intermittent (01-26-24 @ 12:29)   25 mL/Hr IV Intermittent (01-26-24 @ 05:27)   25 mL/Hr IV Intermittent (01-25-24 @ 21:23)   25 mL/Hr IV Intermittent (01-25-24 @ 14:15)   25 mL/Hr IV Intermittent (01-25-24 @ 06:01)   25 mL/Hr IV Intermittent (01-24-24 @ 21:49)   25 mL/Hr IV Intermittent (01-24-24 @ 15:27)   25 mL/Hr IV Intermittent (01-24-24 @ 06:32)   25 mL/Hr IV Intermittent (01-23-24 @ 22:13)    vancomycin  IVPB   250 mL/Hr IV Intermittent (01-28-24 @ 10:46)   250 mL/Hr IV Intermittent (01-27-24 @ 10:58)          Vital Signs Last 24 Hrs  T(C): 36.6 (28 Jan 2024 21:37), Max: 37.3 (28 Jan 2024 06:00)  T(F): 97.9 (28 Jan 2024 21:37), Max: 99.2 (28 Jan 2024 06:00)  HR: 76 (28 Jan 2024 21:37) (75 - 76)  BP: 158/78 (28 Jan 2024 21:37) (158/78 - 164/84)  BP(mean): --  RR: 18 (28 Jan 2024 21:37) (18 - 18)  SpO2: 96% (28 Jan 2024 21:37) (95% - 96%)    Parameters below as of 28 Jan 2024 21:37  Patient On (Oxygen Delivery Method): room air        PHYSICAL EXAM:  Constitutional:  female, NAD  Eyes:  Sclerae anicteric, conjunctivae clear, PERRL  Ear/Nose/Throat:  No nasal exudate or sinus tenderness  Neck:  Supple, no adenopathy  Respiratory:  Clear bilaterally  Cardiovascular:  RRR, S1S2, no murmur appreciated  Gastrointestinal:  Symmetric, normoactive BS, soft, NT  Extremities:  No edema      LABS:                        12.5   11.43 )-----------( 136      ( 28 Jan 2024 05:30 )             37.7         01-28    140  |  108  |  6<L>  ----------------------------<  87  3.5   |  22  |  0.59    Ca    8.2<L>      28 Jan 2024 05:30  Phos  2.6     01-28  Mg     1.9     01-28    TPro  5.1<L>  /  Alb  2.6<L>  /  TBili  0.7  /  DBili  x   /  AST  29  /  ALT  26  /  AlkPhos  82  01-28      Urinalysis Basic - ( 28 Jan 2024 05:30 )    Color: x / Appearance: x / SG: x / pH: x  Gluc: 87 mg/dL / Ketone: x  / Bili: x / Urobili: x   Blood: x / Protein: x / Nitrite: x   Leuk Esterase: x / RBC: x / WBC x   Sq Epi: x / Non Sq Epi: x / Bacteria: x        MICROBIOLOGY:      Culture - Blood (collected 28 Jan 2024 05:30)  Source: .Blood Blood  Preliminary Report (29 Jan 2024 00:00):    No growth at 12 hours    Culture - Body Fluid with Gram Stain (collected 26 Jan 2024 22:00)  Source: .Body Fluid hand abcess fluid  Gram Stain (27 Jan 2024 05:21):    Few Gram positive cocci in pairs    Moderate to Numerous White blood cells  Preliminary Report (28 Jan 2024 13:45):    Moderate Staphylococcus aureus Presumptive Methicillin susceptible    Confirmation to follow within 24 hrs.    Susceptibility to follow.    Culture in progress    Urinalysis with Rflx Culture (collected 23 Jan 2024 03:04)    Culture - Blood (collected 22 Jan 2024 18:56)  Source: .Blood Blood-Peripheral  Final Report (28 Jan 2024 01:01):    No growth at 5 days    Culture - Blood (collected 22 Jan 2024 18:56)  Source: .Blood Blood-Peripheral  Final Report (28 Jan 2024 01:01):    No growth at 5 days        RADIOLOGY & ADDITIONAL STUDIES:

## 2024-01-28 NOTE — PROGRESS NOTE ADULT - ASSESSMENT
89 yo F with Glaucoma, Hypothyroidism, HTN, diverticulitis, jensen's esophagus, ?MGUS/MM presented from [facility]. PIV reportedly blew and that triggered ortho consult. L hand CT showed Rim-enhancing fluid collection communicating with the underlying   extensor tendons including the second, third, and fourth extensor compartments s/p bedside I&D. 1/22 BCx ngtd. 1/26 abscess fluid cx with MSSA. 1/28 BCx ngtd.  Suggest  - DC Vancomycin and Piptazo  - IV Cefazolin 2gm Q8H    ID Team 2 will follow

## 2024-01-28 NOTE — PROGRESS NOTE ADULT - SUBJECTIVE AND OBJECTIVE BOX
Patient is a 88y old  Female who presents with a chief complaint of syncope/fall (27 Jan 2024 19:56)      SUBJECTIVE / OVERNIGHT EVENTS:  Reports hand is still sore.   Bandaged.   Afebrile.  Mild leukocytosis.    MEDICATIONS  (STANDING):  carvedilol 6.25 milliGRAM(s) Oral every 12 hours  latanoprost 0.005% Ophthalmic Solution 1 Drop(s) Both EYES at bedtime  levothyroxine 88 MICROGram(s) Oral daily  pantoprazole    Tablet 40 milliGRAM(s) Oral every 12 hours  piperacillin/tazobactam IVPB.. 4.5 Gram(s) IV Intermittent every 8 hours  potassium chloride   Powder 40 milliEquivalent(s) Oral once  potassium phosphate / sodium phosphate Powder (PHOS-NaK) 1 Packet(s) Oral once  vancomycin  IVPB 1000 milliGRAM(s) IV Intermittent every 24 hours    MEDICATIONS  (PRN):  acetaminophen     Tablet .. 650 milliGRAM(s) Oral every 6 hours PRN Temp greater or equal to 38C (100.4F), Mild Pain (1 - 3)  aluminum hydroxide/magnesium hydroxide/simethicone Suspension 30 milliLiter(s) Oral every 4 hours PRN Dyspepsia  melatonin 3 milliGRAM(s) Oral at bedtime PRN Insomnia  ondansetron Injectable 4 milliGRAM(s) IV Push every 8 hours PRN Nausea and/or Vomiting      CAPILLARY BLOOD GLUCOSE        I&O's Summary      PHYSICAL EXAM:  Vital Signs Last 24 Hrs  T(C): 36.6 (28 Jan 2024 11:11), Max: 37.3 (28 Jan 2024 06:00)  T(F): 97.8 (28 Jan 2024 11:11), Max: 99.2 (28 Jan 2024 06:00)  HR: 75 (28 Jan 2024 11:11) (75 - 78)  BP: 162/75 (28 Jan 2024 11:11) (156/80 - 180/81)  BP(mean): --  RR: 18 (28 Jan 2024 11:11) (18 - 18)  SpO2: 95% (28 Jan 2024 11:11) (94% - 95%)    Parameters below as of 28 Jan 2024 11:11  Patient On (Oxygen Delivery Method): room air  O2 Flow (L/min): 21    Physical Exam:  General: in no acute distress  Lungs: CTA B/L. No wheezes, rales, or rhonchi  Cardiovascular: RRR. No murmurs, rubs, or gallops  Abdomen: Soft, non-tender non-distended; No rebound or guarding  Extremities: WWP, No clubbing, cyanosis or edema  MSK: No midline bony tenderness. No CVA tenderness bilaterally  Neurological: Alert and oriented x3  Skin: L hand wrapped in gauze; no signs of other rash    LABS:                        12.5   11.43 )-----------( 136      ( 28 Jan 2024 05:30 )             37.7     01-28    140  |  108  |  6<L>  ----------------------------<  87  3.5   |  22  |  0.59    Ca    8.2<L>      28 Jan 2024 05:30  Phos  2.6     01-28  Mg     1.9     01-28    TPro  5.1<L>  /  Alb  2.6<L>  /  TBili  0.7  /  DBili  x   /  AST  29  /  ALT  26  /  AlkPhos  82  01-28          Urinalysis Basic - ( 28 Jan 2024 05:30 )    Color: x / Appearance: x / SG: x / pH: x  Gluc: 87 mg/dL / Ketone: x  / Bili: x / Urobili: x   Blood: x / Protein: x / Nitrite: x   Leuk Esterase: x / RBC: x / WBC x   Sq Epi: x / Non Sq Epi: x / Bacteria: x        RADIOLOGY & ADDITIONAL TESTS:    Imaging Personally Reviewed:    Consultant(s) Notes Reviewed:      Care Discussed with Consultants/Other Providers:

## 2024-01-29 ENCOUNTER — TRANSCRIPTION ENCOUNTER (OUTPATIENT)
Age: 89
End: 2024-01-29

## 2024-01-29 ENCOUNTER — RESULT REVIEW (OUTPATIENT)
Age: 89
End: 2024-01-29

## 2024-01-29 DIAGNOSIS — A49.01 METHICILLIN SUSCEPTIBLE STAPHYLOCOCCUS AUREUS INFECTION, UNSPECIFIED SITE: ICD-10-CM

## 2024-01-29 DIAGNOSIS — L02.519 CUTANEOUS ABSCESS OF UNSPECIFIED HAND: ICD-10-CM

## 2024-01-29 LAB
-  CLINDAMYCIN: SIGNIFICANT CHANGE UP
-  ERYTHROMYCIN: SIGNIFICANT CHANGE UP
-  LINEZOLID: SIGNIFICANT CHANGE UP
-  OXACILLIN: SIGNIFICANT CHANGE UP
-  RIFAMPIN: SIGNIFICANT CHANGE UP
-  TETRACYCLINE: SIGNIFICANT CHANGE UP
-  TRIMETHOPRIM/SULFAMETHOXAZOLE: SIGNIFICANT CHANGE UP
-  VANCOMYCIN: SIGNIFICANT CHANGE UP
ANION GAP SERPL CALC-SCNC: 10 MMOL/L — SIGNIFICANT CHANGE UP (ref 5–17)
ANION GAP SERPL CALC-SCNC: 11 MMOL/L — SIGNIFICANT CHANGE UP (ref 5–17)
ANISOCYTOSIS BLD QL: SIGNIFICANT CHANGE UP
APTT BLD: 27.6 SEC — SIGNIFICANT CHANGE UP (ref 24.5–35.6)
BASOPHILS # BLD AUTO: 0 K/UL — SIGNIFICANT CHANGE UP (ref 0–0.2)
BASOPHILS NFR BLD AUTO: 0 % — SIGNIFICANT CHANGE UP (ref 0–2)
BUN SERPL-MCNC: 7 MG/DL — SIGNIFICANT CHANGE UP (ref 7–23)
BUN SERPL-MCNC: 7 MG/DL — SIGNIFICANT CHANGE UP (ref 7–23)
BURR CELLS BLD QL SMEAR: PRESENT — SIGNIFICANT CHANGE UP
CALCIUM SERPL-MCNC: 8.2 MG/DL — LOW (ref 8.4–10.5)
CALCIUM SERPL-MCNC: 8.2 MG/DL — LOW (ref 8.4–10.5)
CHLORIDE SERPL-SCNC: 106 MMOL/L — SIGNIFICANT CHANGE UP (ref 96–108)
CHLORIDE SERPL-SCNC: 110 MMOL/L — HIGH (ref 96–108)
CO2 SERPL-SCNC: 23 MMOL/L — SIGNIFICANT CHANGE UP (ref 22–31)
CO2 SERPL-SCNC: 24 MMOL/L — SIGNIFICANT CHANGE UP (ref 22–31)
CREAT SERPL-MCNC: 0.73 MG/DL — SIGNIFICANT CHANGE UP (ref 0.5–1.3)
CREAT SERPL-MCNC: 0.75 MG/DL — SIGNIFICANT CHANGE UP (ref 0.5–1.3)
EGFR: 77 ML/MIN/1.73M2 — SIGNIFICANT CHANGE UP
EGFR: 79 ML/MIN/1.73M2 — SIGNIFICANT CHANGE UP
EOSINOPHIL # BLD AUTO: 0.39 K/UL — SIGNIFICANT CHANGE UP (ref 0–0.5)
EOSINOPHIL NFR BLD AUTO: 3.5 % — SIGNIFICANT CHANGE UP (ref 0–6)
GIANT PLATELETS BLD QL SMEAR: PRESENT — SIGNIFICANT CHANGE UP
GLUCOSE SERPL-MCNC: 89 MG/DL — SIGNIFICANT CHANGE UP (ref 70–99)
GLUCOSE SERPL-MCNC: 96 MG/DL — SIGNIFICANT CHANGE UP (ref 70–99)
HCT VFR BLD CALC: 39.1 % — SIGNIFICANT CHANGE UP (ref 34.5–45)
HGB BLD-MCNC: 13.3 G/DL — SIGNIFICANT CHANGE UP (ref 11.5–15.5)
INR BLD: 1.15 — SIGNIFICANT CHANGE UP (ref 0.85–1.18)
LYMPHOCYTES # BLD AUTO: 1.16 K/UL — SIGNIFICANT CHANGE UP (ref 1–3.3)
LYMPHOCYTES # BLD AUTO: 10.5 % — LOW (ref 13–44)
MAGNESIUM SERPL-MCNC: 2 MG/DL — SIGNIFICANT CHANGE UP (ref 1.6–2.6)
MANUAL SMEAR VERIFICATION: SIGNIFICANT CHANGE UP
MCHC RBC-ENTMCNC: 33.3 PG — SIGNIFICANT CHANGE UP (ref 27–34)
MCHC RBC-ENTMCNC: 34 GM/DL — SIGNIFICANT CHANGE UP (ref 32–36)
MCV RBC AUTO: 98 FL — SIGNIFICANT CHANGE UP (ref 80–100)
METAMYELOCYTES # FLD: 1.8 % — HIGH (ref 0–0)
METHOD TYPE: SIGNIFICANT CHANGE UP
MICROCYTES BLD QL: SIGNIFICANT CHANGE UP
MONOCYTES # BLD AUTO: 1.06 K/UL — HIGH (ref 0–0.9)
MONOCYTES NFR BLD AUTO: 9.6 % — SIGNIFICANT CHANGE UP (ref 2–14)
NEUTROPHILS # BLD AUTO: 8.23 K/UL — HIGH (ref 1.8–7.4)
NEUTROPHILS NFR BLD AUTO: 74.6 % — SIGNIFICANT CHANGE UP (ref 43–77)
OVALOCYTES BLD QL SMEAR: SLIGHT — SIGNIFICANT CHANGE UP
PHOSPHATE SERPL-MCNC: 3.3 MG/DL — SIGNIFICANT CHANGE UP (ref 2.5–4.5)
PLAT MORPH BLD: ABNORMAL
PLATELET # BLD AUTO: 150 K/UL — SIGNIFICANT CHANGE UP (ref 150–400)
POIKILOCYTOSIS BLD QL AUTO: SLIGHT — SIGNIFICANT CHANGE UP
POLYCHROMASIA BLD QL SMEAR: SLIGHT — SIGNIFICANT CHANGE UP
POTASSIUM SERPL-MCNC: 3.1 MMOL/L — LOW (ref 3.5–5.3)
POTASSIUM SERPL-MCNC: 3.6 MMOL/L — SIGNIFICANT CHANGE UP (ref 3.5–5.3)
POTASSIUM SERPL-SCNC: 3.1 MMOL/L — LOW (ref 3.5–5.3)
POTASSIUM SERPL-SCNC: 3.6 MMOL/L — SIGNIFICANT CHANGE UP (ref 3.5–5.3)
PROTHROM AB SERPL-ACNC: 13.1 SEC — HIGH (ref 9.5–13)
RBC # BLD: 3.99 M/UL — SIGNIFICANT CHANGE UP (ref 3.8–5.2)
RBC # FLD: 16 % — HIGH (ref 10.3–14.5)
RBC BLD AUTO: ABNORMAL
SODIUM SERPL-SCNC: 139 MMOL/L — SIGNIFICANT CHANGE UP (ref 135–145)
SODIUM SERPL-SCNC: 145 MMOL/L — SIGNIFICANT CHANGE UP (ref 135–145)
SPHEROCYTES BLD QL SMEAR: SLIGHT — SIGNIFICANT CHANGE UP
VANCOMYCIN TROUGH SERPL-MCNC: 7.8 UG/ML — LOW (ref 10–20)
WBC # BLD: 11.03 K/UL — HIGH (ref 3.8–10.5)
WBC # FLD AUTO: 11.03 K/UL — HIGH (ref 3.8–10.5)

## 2024-01-29 PROCEDURE — 88104 CYTOPATH FL NONGYN SMEARS: CPT | Mod: 26

## 2024-01-29 PROCEDURE — 99232 SBSQ HOSP IP/OBS MODERATE 35: CPT

## 2024-01-29 PROCEDURE — 99233 SBSQ HOSP IP/OBS HIGH 50: CPT | Mod: GC

## 2024-01-29 PROCEDURE — 88305 TISSUE EXAM BY PATHOLOGIST: CPT | Mod: 26

## 2024-01-29 PROCEDURE — 43239 EGD BIOPSY SINGLE/MULTIPLE: CPT | Mod: GC

## 2024-01-29 PROCEDURE — 88312 SPECIAL STAINS GROUP 1: CPT | Mod: 26

## 2024-01-29 RX ORDER — FLUCONAZOLE 150 MG/1
200 TABLET ORAL EVERY 24 HOURS
Refills: 0 | Status: DISCONTINUED | OUTPATIENT
Start: 2024-01-30 | End: 2024-01-30

## 2024-01-29 RX ORDER — POTASSIUM CHLORIDE 20 MEQ
40 PACKET (EA) ORAL EVERY 4 HOURS
Refills: 0 | Status: COMPLETED | OUTPATIENT
Start: 2024-01-29 | End: 2024-01-29

## 2024-01-29 RX ORDER — FLUCONAZOLE 150 MG/1
400 TABLET ORAL ONCE
Refills: 0 | Status: COMPLETED | OUTPATIENT
Start: 2024-01-29 | End: 2024-01-29

## 2024-01-29 RX ORDER — PANTOPRAZOLE SODIUM 20 MG/1
1 TABLET, DELAYED RELEASE ORAL
Qty: 0 | Refills: 0 | DISCHARGE
Start: 2024-01-29

## 2024-01-29 RX ORDER — RAMIPRIL 5 MG
0 CAPSULE ORAL
Qty: 0 | Refills: 0 | DISCHARGE

## 2024-01-29 RX ORDER — CEFAZOLIN SODIUM 1 G
2 VIAL (EA) INJECTION
Qty: 0 | Refills: 0 | DISCHARGE
Start: 2024-01-29

## 2024-01-29 RX ORDER — CARVEDILOL PHOSPHATE 80 MG/1
1 CAPSULE, EXTENDED RELEASE ORAL
Qty: 0 | Refills: 0 | DISCHARGE
Start: 2024-01-29

## 2024-01-29 RX ADMIN — Medication 100 MILLIGRAM(S): at 06:24

## 2024-01-29 RX ADMIN — LATANOPROST 1 DROP(S): 0.05 SOLUTION/ DROPS OPHTHALMIC; TOPICAL at 23:13

## 2024-01-29 RX ADMIN — PANTOPRAZOLE SODIUM 40 MILLIGRAM(S): 20 TABLET, DELAYED RELEASE ORAL at 10:58

## 2024-01-29 RX ADMIN — Medication 40 MILLIEQUIVALENT(S): at 10:57

## 2024-01-29 RX ADMIN — CARVEDILOL PHOSPHATE 6.25 MILLIGRAM(S): 80 CAPSULE, EXTENDED RELEASE ORAL at 06:19

## 2024-01-29 RX ADMIN — CARVEDILOL PHOSPHATE 6.25 MILLIGRAM(S): 80 CAPSULE, EXTENDED RELEASE ORAL at 18:53

## 2024-01-29 RX ADMIN — PANTOPRAZOLE SODIUM 40 MILLIGRAM(S): 20 TABLET, DELAYED RELEASE ORAL at 23:14

## 2024-01-29 RX ADMIN — Medication 100 MILLIGRAM(S): at 23:12

## 2024-01-29 RX ADMIN — FLUCONAZOLE 100 MILLIGRAM(S): 150 TABLET ORAL at 18:43

## 2024-01-29 RX ADMIN — Medication 88 MICROGRAM(S): at 06:20

## 2024-01-29 RX ADMIN — Medication 100 MILLIGRAM(S): at 15:05

## 2024-01-29 RX ADMIN — Medication 40 MILLIEQUIVALENT(S): at 15:06

## 2024-01-29 NOTE — PRE-ANESTHESIA EVALUATION ADULT - NSANTHPMHFT_GEN_ALL_CORE
89YO F with PMHx of Glaucoma, Hypothyroidism, HTN, diverticulitis, jensen's esophagus, ?MGUS/MM presented from [facility] after she was found conscious on the ground in her apartment. Her building superintendant was concerned bec. she not  newspaper for 2 days and found patient lying awake on the floor and weak and thereafter called EMS. Per patient, she does not recall what happened (lost consciousness). Of note, upon arrival to [facility], extensive bruising/echymoses were apparent of various stages of healing dispersed throughout her buttocks, back, arms, and legs.   Denies chest pain, abdominal pain, N/V/D, fever, fatigue, chills.

## 2024-01-29 NOTE — PROGRESS NOTE ADULT - PROBLEM SELECTOR PLAN 6
Presented w/ 2/4 SIRS (HR, WBC) w/ elevated lactate. Lactate resolved s/p IVF. No clear infectious source.   COVID/Influenza/RSV and UAnegative.   CTPE, CTAP unremarkable; Procal 0.58; DDx: Reactive iso fall/found down for multiple days vs. infectious  - zosyn 4.5 empiric given AMS. -> complete 7 day course 1/22-1/29    #possible MM  pt is currently being worked up for MM per outpt. Presented w/ 2/4 SIRS (HR, WBC) w/ elevated lactate. Lactate resolved s/p IVF. No clear infectious source.   COVID/Influenza/RSV and UAnegative.   CTPE, CTAP unremarkable; Procal 0.58; DDx: Reactive iso fall/found down for multiple days vs. infectious  - zosyn 4.5 empiric given AMS. -> completed 7 day course 1/22-1/29    #possible MM  pt is currently being worked up for MM per outpt.

## 2024-01-29 NOTE — PROGRESS NOTE ADULT - PROBLEM SELECTOR PLAN 2
No known history of Afib, HR 84. Not on on home medications as patient unaware of afib/never has been told she has afib.   It is possible patient had this prior to admission and has paroxysmal afib, versus potential sickness triggering the afib.   - CHADSVASC 3; HASBLED 1  - c/w metoprolol 25mg q8 for rate control  - continued discussion regarding AC versus holding off on AC given bleeding risk  - Goal HR <110 [RACE II trial]  - Consider trial of heparin gtt in AM     #Ventricular ectopy   Presented with ventricular ectopy. No previous cardiac history. Likely 2/2 electrolyte abnormalities versus contributions from afib as per above .   TTE 1/23: normal b/l ventricular systolic function. mild MR.   - per cards, w/ ectopy burden and new run of afib, increase metoprolol to 25mg tid w/ holding parameters No known history of Afib, HR 84. Not on on home medications as patient unaware of afib/never has been told she has afib.   It is possible patient had this prior to admission and has paroxysmal afib, versus potential sickness triggering the afib.   - CHADSVASC 3; HASBLED 1  - Transitioned Metroprolol to coreg 6.25 mg BID  - continued discussion regarding AC versus holding off on AC given bleeding risk  - Goal HR <110 [RACE II trial]    #Ventricular ectopy   Presented with ventricular ectopy. No previous cardiac history. Likely 2/2 electrolyte abnormalities versus contributions from afib as per above .   TTE 1/23: normal b/l ventricular systolic function. mild MR.   - per cards, w/ ectopy burden and new run of afib, increase metoprolol to 25mg tid w/ holding parameters

## 2024-01-29 NOTE — PRE-ANESTHESIA EVALUATION ADULT - NSRADCARDRESULTSFT_GEN_ALL_CORE
1. Normal left and right ventricular size and systolic function.  2. Mild symmetric left ventricular hypertrophy.  3. Grade I left ventricular diastolic dysfunction.  4. Mild mitral regurgitation.  5. Aortic sclerosis without significant stenosis.  6. Pulmonary artery systolic pressure is 32 mmHg.  7. Trivial pericardial effusion.

## 2024-01-29 NOTE — PROGRESS NOTE ADULT - ATTENDING COMMENTS
89yo F with PMHx of Glaucoma, Hypothyroidism, HTN who presented from [facility] due to being found conscious on the ground in her apartment after a fall, found to meet SIRS criteria (sepsis vs. reactive) and ectopy, now admitted to telemetry for work-up of syncope, altered mental status, and further monitoring now stable for stepdown to F. New episode of melena, HDS.     Labs and imaging reviewed    Problem List  #Acute Blood Loss Anemia  #UGIB  #MSSA Abscess Left Hand  #Hypothyroidism  #Afib  #Glaucoma  #Hand swelling    Problem List  -EGD with GI today  -Continue Cefazolin while inpatient, will discuss with ID/Gen Surg, can likely plan transition to oral and Plan for D/C  -Anticipate D/C within 12-24h

## 2024-01-29 NOTE — DIETITIAN NUTRITION RISK NOTIFICATION - ADDITIONAL COMMENTS/DIETITIAN RECOMMENDATIONS
PES: Severe Protein Calorie Malnutrition in the acute setting related to inadequate PO intake as evidenced by PO intake <50% estimated energy needs >/= 5 days and edema 4+ in left wrist.     Goal: Pt will meet 75% or more of protein/energy needs via most appropriate route for nutrition.     Recommendations:  -Continue current diet.  >> Recommend advancing diet and adding Chocolate Ensure and Vanilla Boost Pudding 1 x day when medically feasible.  >> Recommend adding thiamine 100 mg x 5 days when medically feasible.   >> Textures/consistencies per SLP.  -Monitor lytes and replete prn   -Align nutrition with goals of care at all times  -Weekly wts  -Monitor chemistry, GI function, and skin integrity

## 2024-01-29 NOTE — PROGRESS NOTE ADULT - ASSESSMENT
I M    88 y o F with PMHx of Glaucoma, Hypothyroidism, HTN who presented from [facility] due to being found conscious on the ground in her apartment after a fall, found to meet SIRS criteria (sepsis vs. reactive) and ectopy, now admitted to telemetry for work-up of syncope, altered mental status, and further monitoring now stable for stepdown to F.      Problem/Plan - 1:  ·  Problem: Wound of skin.   ·  Plan: Patient noted to have wound on L hand. On exam, hand was erythematous and swollen. There was a fluctuant mass that did not express pus. Patient was able to move extremity, but reported pain with movement. Pulses in tact. CT of hand - tenosynovitis, tearing of flexor tendon  - Ortho consulted; s/p bedside I&D  - Culture preliminarily GPC; started vancomycin 1g q24hr   - ID consulted - c/w vanc and zosyn (as discussed below) until speciation of cultures.    Problem/Plan - 2:  ·  Problem: Atrial fibrillation.   ·  Plan: No known history of Afib, HR 84. Not on on home medications as patient unaware of afib/never has been told she has afib.   It is possible patient had this prior to admission and has paroxysmal afib, versus potential sickness triggering the afib.   - CHADSVASC 3; HASBLED 1  - c/w metoprolol 25mg q8 for rate control  - continued discussion regarding AC versus holding off on AC given bleeding risk  - Goal HR <110 [RACE II trial]  - Consider trial of heparin gtt in AM     #Ventricular ectopy   Presented with ventricular ectopy. No previous cardiac history. Likely 2/2 electrolyte abnormalities versus contributions from afib as per above .   TTE 1/23: normal b/l ventricular systolic function. mild MR.   - per cards, w/ ectopy burden and new run of afib, increase metoprolol to 25mg tid w/ holding parameters.    Problem/Plan - 3:  ·  Problem: Melena.   ·  Plan: New episode of melena (3 total since admission). Hgb stable.   No known history of GI bleeds, patient has had a scope before (unsure exactly when) but was told it was normal.  - c/w PO [id number] mg BID  - GI reconsulted - plan for EGD Monday   - hold off on AC at this time.    Problem/Plan - 4:  ·  Problem: Metabolic encephalopathy.   ·  Plan: Improving. On admission, patient was altered. UTox, salicylate, acetaminophen neg.   CTH: neg for acute pathology. neurological causes unlikely.   - vEEG r/o seizure, still pending  - Hypernatremia resolved  - empiric Abx: Zosyn 4.5 q8, 7 day course 1/22-1/29.    Problem/Plan - 5:  ·  Problem: Fall.   ·  Plan: Presented s/p fall. Pt with no recollection of event (though denies falls) but multiple sites of ecchymosis suggests fall.   Etiology unclear: suspect mechanical (general deconditioning) vs orthostatic vs. possible cardiogenic run of afib (seen while on tele).  - f/u orthostatics when reconditioned  - PT -> Rec STEFANO.    Problem/Plan - 6:  ·  Problem: Severe sepsis.   ·  Plan: Presented w/ 2/4 SIRS (HR, WBC) w/ elevated lactate. Lactate resolved s/p IVF. No clear infectious source.   COVID/Influenza/RSV and UAnegative.   CTPE, CTAP unremarkable; Procal 0.58; DDx: Reactive iso fall/found down for multiple days vs. infectious  - zosyn 4.5 empiric given AMS. -> complete 7 day course 1/22-1/29    #possible MM  pt is currently being worked up for MM per outpt.    Problem/Plan - 7:  ·  Problem: Hypernatremia.   ·  Plan: Resolved.   P/w hypernatremia 149 s/p fall and poor PO intake x 2 days. Most likely 2/2 hypovolemia, evident w/ concentrated urine on exam.     #HAGMA - RESOLVED  #elevated serum lactate - RESOLVED  POA w/ HAGMA w/ most likely cause to be lactic acidosis. resolved w/ D5 w/ 1AMP bicarb.    Problem/Plan - 8:  ·  Problem: Dysphagia.   ·  Plan: P/w AMS. Per family bedside voice is more hoarse than baseline. Failed dysphagia testing this admission.    SS consulted. FEES 1/23 showed severe pharyngeal dysphagia. However vocal ford found to be inadequately closing.  - aspiration precautions  - soft, bite sized diet w/ thin liquid per SS recs  - ENT recommending outpatient follow up and continue working w/ SLP  - no straws at bedside to prevent aspiration.    Problem/Plan - 9:  ·  Problem: Hypothyroidism.   ·  Plan: TSH elevated at 8.727. FT4 WNL. Subclinical.  - continue home Levothyroxine 88mcg PO    #Glaucoma  - continue home med Latanoprost 0.005%    #HTN  - holding Home med Ramipril 10 i/s/o hypovolemia.    Problem/Plan - 10:  ·  Problem: Prophylactic measure.   ·  Plan; Fluid: tolerating PO  Electrolytes: replete PRN  Nutrition: soft bite sized  DVT ppx: hold for now iso c/f gi bleed  GI ppx: none for now   Code: DNR DNI ([facility])  Dispo: [facility].

## 2024-01-29 NOTE — PROGRESS NOTE ADULT - SUBJECTIVE AND OBJECTIVE BOX
INTERVAL HPI/OVERNIGHT EVENTS:    CONSTITUTIONAL:  No fever, chills, night sweats  EYES:  No photophobia or visual changes  CARDIOVASCULAR:  No chest pain  RESPIRATORY:  No cough, wheezing, or SOB   GASTROINTESTINAL:  No nausea, vomiting, diarrhea, constipation, or abdominal pain  GENITOURINARY:  No frequency, urgency, dysuria or hematuria  NEUROLOGIC:  No headache, lightheadedness      ANTIBIOTICS/RELEVANT:          Vital Signs Last 24 Hrs  T(C): 36.7 (29 Jan 2024 21:14), Max: 37 (29 Jan 2024 06:16)  T(F): 98 (29 Jan 2024 21:14), Max: 98.6 (29 Jan 2024 06:16)  HR: 85 (29 Jan 2024 21:14) (70 - 85)  BP: 140/67 (29 Jan 2024 21:14) (140/67 - 168/80)  BP(mean): 99 (29 Jan 2024 14:48) (99 - 99)  RR: 17 (29 Jan 2024 21:14) (16 - 18)  SpO2: 94% (29 Jan 2024 21:14) (94% - 95%)    Parameters below as of 29 Jan 2024 21:14  Patient On (Oxygen Delivery Method): room air        PHYSICAL EXAM:  Constitutional:  Alert  Eyes:  Sclerae anicteric, conjunctivae clear, PERRL  Ear/Nose/Throat:  No nasal exudate or sinus tenderness;  No buccal mucosal lesions, no pharyngeal erythema or exudate	  Neck:  Supple, no adenopathy  Respiratory:  Clear bilaterally  Cardiovascular:  RRR, S1S2, no murmur appreciated  Gastrointestinal:  Symmetric, normoactive BS, soft, NT, no masses, guarding or rebound.  No HSM  Extremities:  No edema      LABS:                        13.3   11.03 )-----------( 150      ( 29 Jan 2024 05:30 )             39.1         01-29    139  |  106  |  7   ----------------------------<  96  3.6   |  23  |  0.75    Ca    8.2<L>      29 Jan 2024 13:52  Phos  3.3     01-29  Mg     2.0     01-29    TPro  5.1<L>  /  Alb  2.6<L>  /  TBili  0.7  /  DBili  x   /  AST  29  /  ALT  26  /  AlkPhos  82  01-28      Urinalysis Basic - ( 29 Jan 2024 13:52 )    Color: x / Appearance: x / SG: x / pH: x  Gluc: 96 mg/dL / Ketone: x  / Bili: x / Urobili: x   Blood: x / Protein: x / Nitrite: x   Leuk Esterase: x / RBC: x / WBC x   Sq Epi: x / Non Sq Epi: x / Bacteria: x        MICROBIOLOGY:        RADIOLOGY & ADDITIONAL STUDIES: INTERVAL HPI/OVERNIGHT EVENTS: Needed superear today to make conversation. Denies somatic complaints.     CONSTITUTIONAL:  No fever, chills, night sweats  EYES:  No photophobia or visual changes  CARDIOVASCULAR:  No chest pain  RESPIRATORY:  No cough, wheezing, or SOB   GASTROINTESTINAL:  No nausea, vomiting, diarrhea, constipation, or abdominal pain  GENITOURINARY:  No frequency, urgency, dysuria or hematuria  NEUROLOGIC:  No headache, lightheadedness      ANTIBIOTICS/RELEVANT:  ceFAZolin   IVPB   100 mL/Hr IV Intermittent (01-29-24 @ 23:12)   100 mL/Hr IV Intermittent (01-29-24 @ 15:05)   100 mL/Hr IV Intermittent (01-29-24 @ 06:24)   100 mL/Hr IV Intermittent (01-28-24 @ 23:06)    cefTRIAXone   IVPB   100 mL/Hr IV Intermittent (01-23-24 @ 04:20)    fluconAZOLE IVPB   100 mL/Hr IV Intermittent (01-29-24 @ 18:43)    piperacillin/tazobactam IVPB.   200 mL/Hr IV Intermittent (01-23-24 @ 11:15)    piperacillin/tazobactam IVPB.-   25 mL/Hr IV Intermittent (01-23-24 @ 14:04)    piperacillin/tazobactam IVPB..   25 mL/Hr IV Intermittent (01-28-24 @ 13:11)   25 mL/Hr IV Intermittent (01-28-24 @ 06:20)   25 mL/Hr IV Intermittent (01-27-24 @ 21:42)   25 mL/Hr IV Intermittent (01-27-24 @ 13:27)   25 mL/Hr IV Intermittent (01-27-24 @ 06:12)   25 mL/Hr IV Intermittent (01-26-24 @ 21:55)   25 mL/Hr IV Intermittent (01-26-24 @ 12:29)   25 mL/Hr IV Intermittent (01-26-24 @ 05:27)   25 mL/Hr IV Intermittent (01-25-24 @ 21:23)   25 mL/Hr IV Intermittent (01-25-24 @ 14:15)   25 mL/Hr IV Intermittent (01-25-24 @ 06:01)   25 mL/Hr IV Intermittent (01-24-24 @ 21:49)   25 mL/Hr IV Intermittent (01-24-24 @ 15:27)   25 mL/Hr IV Intermittent (01-24-24 @ 06:32)   25 mL/Hr IV Intermittent (01-23-24 @ 22:13)    vancomycin  IVPB   250 mL/Hr IV Intermittent (01-28-24 @ 10:46)   250 mL/Hr IV Intermittent (01-27-24 @ 10:58)            Vital Signs Last 24 Hrs  T(C): 36.7 (29 Jan 2024 21:14), Max: 37 (29 Jan 2024 06:16)  T(F): 98 (29 Jan 2024 21:14), Max: 98.6 (29 Jan 2024 06:16)  HR: 85 (29 Jan 2024 21:14) (70 - 85)  BP: 140/67 (29 Jan 2024 21:14) (140/67 - 168/80)  BP(mean): 99 (29 Jan 2024 14:48) (99 - 99)  RR: 17 (29 Jan 2024 21:14) (16 - 18)  SpO2: 94% (29 Jan 2024 21:14) (94% - 95%)    Parameters below as of 29 Jan 2024 21:14  Patient On (Oxygen Delivery Method): room air        PHYSICAL EXAM:  Constitutional:  Alert, frail elderly, female  Eyes:  Sclerae anicteric, conjunctivae clear, PERRL  Ear/Nose/Throat:  No nasal exudate or sinus tenderness  Neck:  Supple, no adenopathy  Respiratory:  Clear bilaterally  Cardiovascular:  RRR, S1S2, no murmur appreciated  Gastrointestinal:  Symmetric, normoactive BS, soft, NT  Extremities:  No edema      LABS:                        13.3   11.03 )-----------( 150      ( 29 Jan 2024 05:30 )             39.1         01-29    139  |  106  |  7   ----------------------------<  96  3.6   |  23  |  0.75    Ca    8.2<L>      29 Jan 2024 13:52  Phos  3.3     01-29  Mg     2.0     01-29    TPro  5.1<L>  /  Alb  2.6<L>  /  TBili  0.7  /  DBili  x   /  AST  29  /  ALT  26  /  AlkPhos  82  01-28      Urinalysis Basic - ( 29 Jan 2024 13:52 )    Color: x / Appearance: x / SG: x / pH: x  Gluc: 96 mg/dL / Ketone: x  / Bili: x / Urobili: x   Blood: x / Protein: x / Nitrite: x   Leuk Esterase: x / RBC: x / WBC x   Sq Epi: x / Non Sq Epi: x / Bacteria: x        MICROBIOLOGY:   Culture - Blood (collected 28 Jan 2024 05:30)  Source: .Blood Blood  Preliminary Report (29 Jan 2024 12:00):    No growth at 1 day.    Culture - Body Fluid with Gram Stain (collected 26 Jan 2024 22:00)  Source: .Body Fluid hand abcess fluid  Gram Stain (27 Jan 2024 05:21):    Few Gram positive cocci in pairs    Moderate to Numerous White blood cells  Preliminary Report (29 Jan 2024 14:31):    Moderate Staphylococcus aureus  Organism: Staphylococcus aureus (29 Jan 2024 09:27)  Organism: Staphylococcus aureus (29 Jan 2024 09:27)    Urinalysis with Rflx Culture (collected 23 Jan 2024 03:04)    Culture - Blood (collected 22 Jan 2024 18:56)  Source: .Blood Blood-Peripheral  Final Report (28 Jan 2024 01:01):    No growth at 5 days    Culture - Blood (collected 22 Jan 2024 18:56)  Source: .Blood Blood-Peripheral  Final Report (28 Jan 2024 01:01):    No growth at 5 days      RADIOLOGY & ADDITIONAL STUDIES:    EGD findings noted with c/f Candidal esophagitits in lower 1/3rd of esophagus.

## 2024-01-29 NOTE — CHART NOTE - NSCHARTNOTEFT_GEN_A_CORE
Admitting Diagnosis:   Patient is a 88y old  Female who presents with a chief complaint of syncope/fall (29 Jan 2024 12:32)      PAST MEDICAL & SURGICAL HISTORY:  Hypothyroidism      HTN (hypertension)      Glaucoma      History of cataract surgery, right          Current Nutrition Order: NPO    GI: Rounded abdomen; soft/nontender. LBM 1/28. Small, soft, dark brown stool per flow sheets.      Pain: no pain/discomfort noted.     Skin Integrity: Francois Score: 13   Multiple skin tears.   Suspected deep tissue injury on buttock.   Stage 1 pressure injuries on Left and right buttock and Sacrum.    Edema 4+ in left wrist on 1/28.      Labs:   01-29    139  |  106  |  7   ----------------------------<  96  3.6   |  23  |  0.75    Ca    8.2<L>      29 Jan 2024 13:52  Phos  3.3     01-29  Mg     2.0     01-29    TPro  5.1<L>  /  Alb  2.6<L>  /  TBili  0.7  /  DBili  x   /  AST  29  /  ALT  26  /  AlkPhos  82  01-28    CAPILLARY BLOOD GLUCOSE          Medications:  MEDICATIONS  (STANDING):  carvedilol 6.25 milliGRAM(s) Oral every 12 hours  ceFAZolin   IVPB 2000 milliGRAM(s) IV Intermittent every 8 hours  fluconAZOLE IVPB 400 milliGRAM(s) IV Intermittent once  latanoprost 0.005% Ophthalmic Solution 1 Drop(s) Both EYES at bedtime  levothyroxine 88 MICROGram(s) Oral daily  pantoprazole    Tablet 40 milliGRAM(s) Oral every 12 hours    MEDICATIONS  (PRN):  acetaminophen     Tablet .. 650 milliGRAM(s) Oral every 6 hours PRN Temp greater or equal to 38C (100.4F), Mild Pain (1 - 3)  aluminum hydroxide/magnesium hydroxide/simethicone Suspension 30 milliLiter(s) Oral every 4 hours PRN Dyspepsia  melatonin 3 milliGRAM(s) Oral at bedtime PRN Insomnia  ondansetron Injectable 4 milliGRAM(s) IV Push every 8 hours PRN Nausea and/or Vomiting      Anthropometrics:  Daily Height in cm: 167.64 (29 Jan 2024 14:48)    Daily   Height (cm): 167.6 (01-29-24 @ 15:38)  Weight (kg): 58.1 (01-29-24 @ 14:48)  BMI (kg/m2): 20.7 (01-29-24 @ 15:38)  BSA (m2): 1.65 (01-29-24 @ 15:38)    Ht: 5"6 IBW: 130 pounds +/-10%   % IBW: 98.5%    Weight change: No recent weight updates. Recommend weekly weights for trending.     Nutrition Focused Physical Exam: Completed [  x ]  Not Pertinent [   ]  **See initial RD note. Pt found to have moderate muscle wasting in temples.     Estimated energy needs:   Weight used for calculations	IBW   Estimated Energy Needs Weight (lbs)	128 lb  Estimated Energy Needs Weight (kg)	58.1 kg  Estimated Energy Needs From (sharath/kg)	25   Estimated Energy Needs To (sharath/kg)	30   Estimated Energy Needs Calculated From (sharath/kg)	1452   Estimated Energy Needs Calculated To (sharath/kg)	1743     Weight used for calculations	IBW   Estimated Protein Needs Weight (lbs)	128 lb  Estimated Protein Needs Weight (kg)	58.1 kg  Estimated Protein Needs From (g/kg)	1.2   Estimated Protein Needs To (g/kg)	1.5   Estimated Protein Needs Calculated From (g/kg)	69.72   Estimated Protein Needs Calculated To (g/kg)	87.15     Estimated Fluid Needs Weight (lbs)	128 lb  Estimated Fluid Needs Weight (kg)	58.1 kg  Estimated Fluid Needs From (ml/kg)	25   Estimated Fluid Needs To (ml/kg)	30   Estimated Fluid Needs Calculated From (ml/kg)	1452   Estimated Fluid Needs Calculated To (ml/kg)	1743     Other Calculations	Estimated nutritional needs determined using [facility] Standards of Nutrition Care for elderly repletion using current body weight 58.1 kg as it is between % IBW.        Subjective:   87YO F with PMHx of Glaucoma (Latanoprost), Hypothyroidism, HTN presents from [facility] due to being found conscious on the ground in her apartment, found to be in Atrial fibrillation with RVR and meeting SIRS criteria, now admitted to telemetry for work-up of syncope, altered mental status, and further monitoring.      Pt seen on 7WO at bed side. Current Diet order: NPO. Pt reports not having much of an appetite. Recalls consuming apple juice and water yesterday while on clear liquid diet. While on soft and bite-sized from 1/24-1/28, pt reports consuming < 50% of 3 meals/day. Denies Nausea/Vomiting/Pain/GI Issues. Notes BM are regular. No issues chewing or swallowing. Dairy intolerance has already been noted. Edema 4+ in left wrist on 1/28. Per ASPEN guidelines, pt meets the criteria for severe protein calorie malnutrition. Labs reviewed: Potassium 3.1 <L>, Chloride 110 <H>, Calcium 8.2 <L>. Nutrition pertinent meds: Synthroid [administer 30-60 minutes before food; separate at least 4hrs from calcium or iron-containing products or bile acid sequestrants], aluminum hydroxide/magnesium, melatonin, Zofran, Protonix. Discussed consuming adequate protein and calories to meet needs and promote wound healing when diet advances. Pt was receptive and verbalized understanding. Pt was agreeable to trying Chocolate Ensure and Vanilla Boost Pudding 1 x day. RD to continue to follow up. Please see recommendations below.     Previous Nutrition Diagnosis: Increased Nutrient Needs... energy/protein related to pt condition as evidenced by increased metabolic demands for healing.     Active [  x ]  Resolved [   ]    Additional PES: Severe Protein Calorie Malnutrition in the acute setting related to inadequate PO intake as evidenced by PO intake <50% estimated energy needs >/= 5 days and edema 4+ in left wrist.     Goal: Pt will meet 75% or more of protein/energy needs via most appropriate route for nutrition.     Recommendations:  -Continue current diet.  >> Recommend advancing diet and adding Chocolate Ensure and Vanilla Boost Pudding 1 x day when medically feasible.  >> Recommend adding thiamine 100 mg x 5 days when medically feasible.   >> Textures/consistencies per SLP.  -Monitor lytes and replete prn   -Align nutrition with goals of care at all times  -Weekly wts  -Monitor chemistry, GI function, and skin integrity    Education: Discussed consuming adequate protein and calories to meet needs and promote wound healing when diet advances. Pt was receptive and verbalized understanding. Pt was agreeable to trying Chocolate Ensure and Vanilla Boost Pudding 1 x day.     Risk Level: High [ xx  ] Moderate [   ] Low [   ]

## 2024-01-29 NOTE — PROGRESS NOTE ADULT - SUBJECTIVE AND OBJECTIVE BOX
Physical Medicine and Rehabilitation Progress Note :       Patient is a 88y old  Female who presents with a chief complaint of syncope/fall (29 Jan 2024 07:13)      HPI:  89YO F with PMHx of Glaucoma (Latanoprost), Hypothyroidism, HTN presents from [facility] due to being found conscious on the ground in her apartment. Per [facility] ED note, patient's building superintendant was concerned when patient did not  newspaper for 2 days and found patient lying awake on the floor and weak and thereafter called EMS. Patient lives alone, with no HHA, and uses cane to walk when outside but no cane/walker inside her home. Per patient, she does not recall what happened (lost consciousness), previously felt confused, but vehemently denies a fall. Her last recollection involves visiting her niece, Alba, in [place] and sitting in her living room to read. She denies any prior history of falls. She denies loss of consciousness, and remained on the floor for ~2 days.   She drinks approximately 1 oz glass of vodka once weekly, with her last drink on Friday.       Of note, upon arrival to [facility], extensive bruising/echymoses were apparent of various stages of healing dispersed throughout her buttocks, back, arms, and legs. When inquiring regarding her feeling safe, she reports yes and denies any concerns or fears of harm to her by others. She reports not having much support, as her brother passed away last year and most of her loved ones have passed away.     Otherwise, she denies any chest pain, abdominal pain, N/V/D, fever, fatigue, chills, or other pertinent Sx on ROS.     ED COURSE  ·	VITALS: T 95.6 F, /75, HR 90, RR 18, SpO2 97% on RA.  ·	LABS: WBC 28.09 (high), %Neutrophils 89.7 (high), %Lymphocytes 3.1, D-dimer 1148 (high), Troponin I 212.6 (high), Na 149 (high), Cl 114 (low), HCO3 19 (low), BUN 51 (high), Albumin 2.7 (low), Alkaline Phosphatase 129 (high),  (high), ALT 56 (high), CK 4434 (high), Pro-BNP 2629 (high), Lactate 3.7 (high). COVID/Influenza/RSV negative.  ·	IMAGING: CTPE negative. CT Hip reveals Degenerative changes of the lumbar spine and pelvis with no fractures. CT C/A/P No Contrast No definite evidence of solid visceral injury in the chest, abdomen or pelvis within the limits of this noncontrast study. CT Head and Cervical spine No Cont negative.  ·	INTERVENTIONS: Aspirin 324mg x1, Levaquin 750mg x1, 2L NS Bolus, IV Ativan 0.5mg x1. 2L NS Bolus.   (23 Jan 2024 04:52)                            13.3   11.03 )-----------( 150      ( 29 Jan 2024 05:30 )             39.1       01-29    145  |  110<H>  |  7   ----------------------------<  89  3.1<L>   |  24  |  0.73    Ca    8.2<L>      29 Jan 2024 05:30  Phos  3.3     01-29  Mg     2.0     01-29    TPro  5.1<L>  /  Alb  2.6<L>  /  TBili  0.7  /  DBili  x   /  AST  29  /  ALT  26  /  AlkPhos  82  01-28    Vital Signs Last 24 Hrs  T(C): 36.4 (29 Jan 2024 12:23), Max: 37 (29 Jan 2024 06:16)  T(F): 97.5 (29 Jan 2024 12:23), Max: 98.6 (29 Jan 2024 06:16)  HR: 78 (29 Jan 2024 12:23) (70 - 78)  BP: 168/80 (29 Jan 2024 12:23) (158/78 - 168/80)  BP(mean): --  RR: 16 (29 Jan 2024 12:23) (16 - 18)  SpO2: 95% (29 Jan 2024 12:23) (94% - 96%)    Parameters below as of 29 Jan 2024 12:23  Patient On (Oxygen Delivery Method): room air        MEDICATIONS  (STANDING):  carvedilol 6.25 milliGRAM(s) Oral every 12 hours  ceFAZolin   IVPB 2000 milliGRAM(s) IV Intermittent every 8 hours  latanoprost 0.005% Ophthalmic Solution 1 Drop(s) Both EYES at bedtime  levothyroxine 88 MICROGram(s) Oral daily  pantoprazole    Tablet 40 milliGRAM(s) Oral every 12 hours  potassium chloride    Tablet ER 40 milliEquivalent(s) Oral every 4 hours    MEDICATIONS  (PRN):  acetaminophen     Tablet .. 650 milliGRAM(s) Oral every 6 hours PRN Temp greater or equal to 38C (100.4F), Mild Pain (1 - 3)  aluminum hydroxide/magnesium hydroxide/simethicone Suspension 30 milliLiter(s) Oral every 4 hours PRN Dyspepsia  melatonin 3 milliGRAM(s) Oral at bedtime PRN Insomnia  ondansetron Injectable 4 milliGRAM(s) IV Push every 8 hours PRN Nausea and/or Vomiting       Functional Status Assessment :       Pain Assessment/Number Scale (0-10) Adult  Presence of Pain: denies pain/discomfort (Rating = 0)  Pain Rating (0-10): Rest: 0 (no pain/absence of nonverbal indicators of pain)  Pain Rating (0-10): Activity: 0 (no pain/absence of nonverbal indicators of pain)    Safety      AM-PAC Functional Assessment: Basic Mobility  Type of Assessment: Daily assessment  Turning from your back to your side while in a flat bed without using bedrails?: 2 = A lot of assistance  Moving from lying on your back to sitting on the flat side of a flat bed without using bedrails?: 2 = A lot of assistance  Moving to and from a bed to a chair (including a wheelchair)?: 2 = A lot of assistance  Standing up from a chair using your arms (e.g. wheelchair or bedside chair)?: 2 = A lot of assistance  Walking in hospital room?: 2 = A lot of assistance  Climbing 3-5 steps with a railing?: 2-calculated by average   Score: 12   Row Comment: Ask the patient "How much help from another person do you currently need? (If the patient hasn't done an activity recently, how much help from another person do you think he/she needs if he/she tried?)    Therapeutic Interventions      Bed Mobility  Bed Mobility Training Rehab Potential: good, to achieve stated therapy goals  Bed Mobility Training Symptoms Noted During/After Treatment: none  Bed Mobility Training Rolling/Turning: contact guard  Bed Mobility Training Scooting: contact guard  Bed Mobility Training Sit-to-Supine: moderate assist (50% patient effort);  1 person assist  Bed Mobility Training Supine-to-Sit: moderate assist (50% patient effort);  1 person assist  Bed Mobility Training Limitations: decreased strength;  impaired balance;  impaired postural control    Sit-Stand Transfer Training  Sit-to-Stand Transfer Training Rehab Potential: fair, will monitor progress closely  Sit-to-Stand Transfer Training Symptoms Noted During/After Treatment: fatigue  Transfer Training Sit-to-Stand Transfer: moderate assist (50% patient effort);  1 person assist;  rolling walker  Transfer Training Stand-to-Sit Transfer: moderate assist (50% patient effort);  1 person assist;  rolling walker  Sit-to-Stand Transfer Training Transfer Safety Analysis: decreased strength;  impaired balance;  impaired postural control    Gait Training  Gait Training Rehab Potential: fair, will monitor progress closely  Gait Training Symptoms Noted During/After Treatment: fatigue  Gait Training: moderate assist (50% patient effort);  minimum assist (75% patient effort);  2 person assist;  rolling walker;  10 feet;  x2  Gait Analysis: decreased yoel;  increased time in double stance;  retropulsion;  shuffling;  decreased step length;  decreased strength;  impaired balance;  impaired postural control    Therapeutic Exercise  Therapeutic Exercise Detail: seated therapeutic exercise: B long arc quads, marching, shoulder flex/ext, elbow flex/ext x 10 reps           PM&R Impression : as above    Current disposition plan recommendation :    subacute rehab placement

## 2024-01-29 NOTE — PROGRESS NOTE ADULT - PROBLEM SELECTOR PLAN 3
New episode of melena (3 total since admission). Hgb stable.   No known history of GI bleeds, patient has had a scope before (unsure exactly when) but was told it was normal.  - c/w PO [id number] mg BID  - GI reconsulted - plan for EGD Monday   - hold off on AC at this time New episodes of melena (3 total since admission). Hgb stable.   No known history of GI bleeds, patient has had a scope before (unsure exactly when) but was told it was normal.  - c/w PO [id number] mg BID  - GI reconsulted - plan for EGD 1/29  - hold off on AC at this time

## 2024-01-29 NOTE — PROGRESS NOTE ADULT - SUBJECTIVE AND OBJECTIVE BOX
Internal Medicine Progress Note  Una Brooks, PGY-1  Pager: [phone]    ******INCOMPLETE******    Patient is a 88y old  Female who presents with a chief complaint of syncope/fall (28 Jan 2024 23:50)    OVERNIGHT EVENTS/INTERVAL HPI:    REVIEW OF SYSTEMS:  All other review of systems is negative unless indicated above.    OBJECTIVE:  T(C): 37 (01-29-24 @ 06:16), Max: 37 (01-29-24 @ 06:16)  HR: 70 (01-29-24 @ 06:16) (70 - 76)  BP: 163/79 (01-29-24 @ 06:16) (158/78 - 163/79)  RR: 18 (01-29-24 @ 06:16) (18 - 18)  SpO2: 94% (01-29-24 @ 06:16) (94% - 96%)  Daily     Daily     Physical Exam:  General: in no acute distress  Eyes: EOMI intact bilaterally. Anicteric sclerae, moist conjunctivae  HENT: Moist mucous membranes  Neck: Trachea midline, supple  Lungs: CTA B/L. No wheezes, rales, or rhonchi  Cardiovascular: RRR. No murmurs, rubs, or gallops  Abdomen: Soft, non-tender non-distended; No rebound or guarding  Extremities: WWP, No clubbing, cyanosis or edema  MSK: No midline bony tenderness. No CVA tenderness bilaterally  Neurological: Alert and oriented x3  Skin: Warm and dry. No obvious rash     Medications:  MEDICATIONS  (STANDING):  carvedilol 6.25 milliGRAM(s) Oral every 12 hours  ceFAZolin   IVPB 2000 milliGRAM(s) IV Intermittent every 8 hours  latanoprost 0.005% Ophthalmic Solution 1 Drop(s) Both EYES at bedtime  levothyroxine 88 MICROGram(s) Oral daily  pantoprazole    Tablet 40 milliGRAM(s) Oral every 12 hours    MEDICATIONS  (PRN):  acetaminophen     Tablet .. 650 milliGRAM(s) Oral every 6 hours PRN Temp greater or equal to 38C (100.4F), Mild Pain (1 - 3)  aluminum hydroxide/magnesium hydroxide/simethicone Suspension 30 milliLiter(s) Oral every 4 hours PRN Dyspepsia  melatonin 3 milliGRAM(s) Oral at bedtime PRN Insomnia  ondansetron Injectable 4 milliGRAM(s) IV Push every 8 hours PRN Nausea and/or Vomiting      Labs:                        12.5   11.43 )-----------( 136      ( 28 Jan 2024 05:30 )             37.7     01-28    140  |  108  |  6<L>  ----------------------------<  87  3.5   |  22  |  0.59    Ca    8.2<L>      28 Jan 2024 05:30  Phos  2.6     01-28  Mg     1.9     01-28    TPro  5.1<L>  /  Alb  2.6<L>  /  TBili  0.7  /  DBili  x   /  AST  29  /  ALT  26  /  AlkPhos  82  01-28      Urinalysis Basic - ( 28 Jan 2024 05:30 )    Color: x / Appearance: x / SG: x / pH: x  Gluc: 87 mg/dL / Ketone: x  / Bili: x / Urobili: x   Blood: x / Protein: x / Nitrite: x   Leuk Esterase: x / RBC: x / WBC x   Sq Epi: x / Non Sq Epi: x / Bacteria: x          Radiology: Reviewed Patient is a 88y old  Female who presents with a chief complaint of syncope/fall (28 Jan 2024 23:50)    OVERNIGHT EVENTS/INTERVAL HPI: No acute events overnight. Patient seen and examined this AM. No acute complaints. Still has tenderness with movement of L hand.     REVIEW OF SYSTEMS:  All other review of systems is negative unless indicated above.    OBJECTIVE:  T(C): 37 (01-29-24 @ 06:16), Max: 37 (01-29-24 @ 06:16)  HR: 70 (01-29-24 @ 06:16) (70 - 76)  BP: 163/79 (01-29-24 @ 06:16) (158/78 - 163/79)  RR: 18 (01-29-24 @ 06:16) (18 - 18)  SpO2: 94% (01-29-24 @ 06:16) (94% - 96%)  Daily     Daily     Physical Exam:  General: in no acute distress  Lungs: CTA B/L. No wheezes, rales, or rhonchi  Cardiovascular: RRR. No murmurs, rubs, or gallops  Abdomen: Soft, non-tender non-distended; No rebound or guarding  Extremities: WWP, No clubbing, cyanosis or edema  MSK: No midline bony tenderness. No CVA tenderness bilaterally  Neurological: Alert and oriented x3  Skin: L hand wrapped in gauze; no signs of other rash    Medications:  MEDICATIONS  (STANDING):  carvedilol 6.25 milliGRAM(s) Oral every 12 hours  ceFAZolin   IVPB 2000 milliGRAM(s) IV Intermittent every 8 hours  latanoprost 0.005% Ophthalmic Solution 1 Drop(s) Both EYES at bedtime  levothyroxine 88 MICROGram(s) Oral daily  pantoprazole    Tablet 40 milliGRAM(s) Oral every 12 hours    MEDICATIONS  (PRN):  acetaminophen     Tablet .. 650 milliGRAM(s) Oral every 6 hours PRN Temp greater or equal to 38C (100.4F), Mild Pain (1 - 3)  aluminum hydroxide/magnesium hydroxide/simethicone Suspension 30 milliLiter(s) Oral every 4 hours PRN Dyspepsia  melatonin 3 milliGRAM(s) Oral at bedtime PRN Insomnia  ondansetron Injectable 4 milliGRAM(s) IV Push every 8 hours PRN Nausea and/or Vomiting      Labs:                        12.5   11.43 )-----------( 136      ( 28 Jan 2024 05:30 )             37.7     01-28    140  |  108  |  6<L>  ----------------------------<  87  3.5   |  22  |  0.59    Ca    8.2<L>      28 Jan 2024 05:30  Phos  2.6     01-28  Mg     1.9     01-28    TPro  5.1<L>  /  Alb  2.6<L>  /  TBili  0.7  /  DBili  x   /  AST  29  /  ALT  26  /  AlkPhos  82  01-28      Urinalysis Basic - ( 28 Jan 2024 05:30 )    Color: x / Appearance: x / SG: x / pH: x  Gluc: 87 mg/dL / Ketone: x  / Bili: x / Urobili: x   Blood: x / Protein: x / Nitrite: x   Leuk Esterase: x / RBC: x / WBC x   Sq Epi: x / Non Sq Epi: x / Bacteria: x          Radiology: Reviewed

## 2024-01-29 NOTE — PROGRESS NOTE ADULT - ASSESSMENT
89yo F with PMHx of Glaucoma, Hypothyroidism, HTN who presented from [facility] due to being found conscious on the ground in her apartment after a fall, found to meet SIRS criteria (sepsis vs. reactive) and ectopy, now admitted to telemetry for work-up of syncope, altered mental status, and further monitoring now stable for stepdown to F.

## 2024-01-29 NOTE — PROGRESS NOTE ADULT - PROBLEM SELECTOR PLAN 10
Fluid: tolerating PO  Electrolytes: replete PRN  Nutrition: soft bite sized  DVT ppx: hold for now iso c/f gi bleed  GI ppx: none for now   Code: DNR DNI ([facility])  Dispo: [place]ARD[place]

## 2024-01-29 NOTE — CHART NOTE - NSCHARTNOTEFT_GEN_A_CORE
Patient is s/p EGD in endoscopy unit for coffee ground emesis and melena.     EGD findings are as follows:   - A medium size hiatal hernia was seen, the esophagogastric junction(EGJ) was noted at 37 cm, with hiatal narrowing at 41 cm from the incisors. Retroflexion view in the stomach confirmed the size and morphology of the hernia.  - Diffuse candidiasis was seen in the lower third of the esophagus and middle third of the esophagus. These findings were suggestive of candidal esophagitis. Samples were obtained for microbiology using a brush. Evaluate for candida esophagitis  - Multiple non-bleeding ulcers were found in the pylorus. Additional findings include These ulcers were consistent with Mj Class III. Multiple cold forceps biopsies were performed for histology in the antrum.  - A post-ulcer gastric deformity was noted in the pre-pyloric region and stomach body. Endoscopic findings were suggestive of healing from prior peptic ulcer disease.  - A medium size hiatal hernia was seen, displacing the gastroesophageal junction (GEJ) to 37cm from the incisors, with hiatal narrowing at 41cm from the incisors. Retroflexion view in the stomach confirmed the size and morphology of the hernia as Hill Grade 2.  - Multiple non-bleeding ulcers were found in the duodenal bulb. Additional findings include The ulcers were clean-based, consistent with Mj Class III.  - Erythema and congestion of the mucosa with no bleeding was noted in the duodenal bulb. These findings are compatible with duodenitis.    Recommendations:   - Advance diet as tolerated   - Continue pantoprazole 40 mg PO daily   - Start fluconazole 400 mg PO x1, followed by 200 mg PO daily for 14 days   - Await pathology results.   - Return to floor for further management   - Out-patient follow-up with GI  - No contraindication to discharge from GI perspective     Case discussed with Dr. Winters. GI team will sign off. Please re-consult with any questions or concerns.     Jacquelyn Gamble D.O.   Gastroenterology Fellow  Weekday 7am-5pm Pager: [phone]  Weeknights/Weekend/Holiday Coverage: Please call the  for contact info Patient is s/p EGD in endoscopy unit for coffee ground emesis and melena.     EGD findings are as follows:   - A medium size hiatal hernia was seen, the esophagogastric junction(EGJ) was noted at 37 cm, with hiatal narrowing at 41 cm from the incisors. Retroflexion view in the stomach confirmed the size and morphology of the hernia as Hill Grade 2.   - Diffuse candidiasis was seen in the lower third of the esophagus and middle third of the esophagus. These findings were suggestive of candidal esophagitis. Samples were obtained for microbiology using a brush. Evaluate for candida esophagitis  - Multiple non-bleeding ulcers were found in the pylorus. Additional findings include These ulcers were consistent with Mj Class III. Multiple cold forceps biopsies were performed for histology in the antrum.  - A post-ulcer gastric deformity was noted in the pre-pyloric region and stomach body. Endoscopic findings were suggestive of healing from prior peptic ulcer disease.  - Multiple non-bleeding ulcers were found in the duodenal bulb. Additional findings include The ulcers were clean-based, consistent with Mj Class III.  - Erythema and congestion of the mucosa with no bleeding was noted in the duodenal bulb. These findings are compatible with duodenitis.    Recommendations:   - Advance diet as tolerated   - Continue pantoprazole 40 mg PO daily   - Start fluconazole 400 mg PO x1, followed by 200 mg PO daily for 14 days   - Await pathology results.   - Return to floor for further management   - Out-patient follow-up with GI  - No contraindication to discharge from GI perspective     Case discussed with Dr. Winters. GI team will sign off. Please re-consult with any questions or concerns.     Jacquelyn Gamble D.O.   Gastroenterology Fellow  Weekday 7am-5pm Pager: [phone]  Weeknights/Weekend/Holiday Coverage: Please call the  for contact info

## 2024-01-29 NOTE — PROGRESS NOTE ADULT - ASSESSMENT
89 yo F with Glaucoma, Hypothyroidism, HTN, diverticulitis, jensen's esophagus, ?MGUS/MM presented from [facility]. PIV reportedly blew and that triggered ortho consult. L hand CT showed Rim-enhancing fluid collection communicating with the underlying   extensor tendons including the second, third, and fourth extensor compartments s/p bedside I&D. 1/22 BCx ngtd. 1/26 abscess fluid cx with MSSA. 1/28 BCx ngtd.  EGD findings noted with c/f Candidal esophagitits in lower 1/3rd of esophagus.   Suggest  - Continue IV Cefazolin 2gm Q8H; will likely need this for 4 weeks since I&D  - Abx stop date: 2/22/24  - F/up BCx  - Check EKG for QTc  - Start PO Fluconazole 200 mg Q24H    ID Team 2 will follow

## 2024-01-29 NOTE — PROGRESS NOTE ADULT - PROBLEM SELECTOR PLAN 1
Patient noted to have wound on L hand. On exam, hand was erythematous and swollen. There was a fluctuant mass that did not express pus. Patient was able to move extremity, but reported pain with movement. Pulses in tact. CT of hand - tenosynovitis, tearing of flexor tendon  - Ortho consulted; s/p bedside I&D  - Culture preliminarily GPC; started vancomycin 1g q24hr   - ID consulted - c/w vanc and zosyn (as discussed below) until speciation of cultures Patient noted to have wound on L hand. On exam, hand was erythematous and swollen. There was a fluctuant mass that did not express pus. Patient was able to move extremity, but reported pain with movement. Pulses in tact. CT of hand - tenosynovitis, tearing of flexor tendon  - F/u ortho recs  - Culture MSSA; on cefazolin 2g q8hr  - Completed course of zosyn 1/22 - 1/29

## 2024-01-30 ENCOUNTER — TRANSCRIPTION ENCOUNTER (OUTPATIENT)
Age: 89
End: 2024-01-30

## 2024-01-30 VITALS
DIASTOLIC BLOOD PRESSURE: 83 MMHG | OXYGEN SATURATION: 96 % | RESPIRATION RATE: 16 BRPM | HEART RATE: 77 BPM | SYSTOLIC BLOOD PRESSURE: 155 MMHG | TEMPERATURE: 98 F

## 2024-01-30 LAB
ANION GAP SERPL CALC-SCNC: 11 MMOL/L — SIGNIFICANT CHANGE UP (ref 5–17)
BASOPHILS # BLD AUTO: 0.08 K/UL — SIGNIFICANT CHANGE UP (ref 0–0.2)
BASOPHILS NFR BLD AUTO: 0.6 % — SIGNIFICANT CHANGE UP (ref 0–2)
BUN SERPL-MCNC: 6 MG/DL — LOW (ref 7–23)
CALCIUM SERPL-MCNC: 8.1 MG/DL — LOW (ref 8.4–10.5)
CHLORIDE SERPL-SCNC: 106 MMOL/L — SIGNIFICANT CHANGE UP (ref 96–108)
CO2 SERPL-SCNC: 22 MMOL/L — SIGNIFICANT CHANGE UP (ref 22–31)
CREAT SERPL-MCNC: 0.64 MG/DL — SIGNIFICANT CHANGE UP (ref 0.5–1.3)
CULTURE RESULTS: ABNORMAL
EGFR: 85 ML/MIN/1.73M2 — SIGNIFICANT CHANGE UP
EOSINOPHIL # BLD AUTO: 0.28 K/UL — SIGNIFICANT CHANGE UP (ref 0–0.5)
EOSINOPHIL NFR BLD AUTO: 2 % — SIGNIFICANT CHANGE UP (ref 0–6)
GLUCOSE SERPL-MCNC: 89 MG/DL — SIGNIFICANT CHANGE UP (ref 70–99)
HCT VFR BLD CALC: 34.6 % — SIGNIFICANT CHANGE UP (ref 34.5–45)
HGB BLD-MCNC: 11.7 G/DL — SIGNIFICANT CHANGE UP (ref 11.5–15.5)
IMM GRANULOCYTES NFR BLD AUTO: 2.2 % — HIGH (ref 0–0.9)
LYMPHOCYTES # BLD AUTO: 1.7 K/UL — SIGNIFICANT CHANGE UP (ref 1–3.3)
LYMPHOCYTES # BLD AUTO: 12.3 % — LOW (ref 13–44)
MCHC RBC-ENTMCNC: 32.4 PG — SIGNIFICANT CHANGE UP (ref 27–34)
MCHC RBC-ENTMCNC: 33.8 GM/DL — SIGNIFICANT CHANGE UP (ref 32–36)
MCV RBC AUTO: 95.8 FL — SIGNIFICANT CHANGE UP (ref 80–100)
MONOCYTES # BLD AUTO: 1.35 K/UL — HIGH (ref 0–0.9)
MONOCYTES NFR BLD AUTO: 9.7 % — SIGNIFICANT CHANGE UP (ref 2–14)
NEUTROPHILS # BLD AUTO: 10.14 K/UL — HIGH (ref 1.8–7.4)
NEUTROPHILS NFR BLD AUTO: 73.2 % — SIGNIFICANT CHANGE UP (ref 43–77)
NRBC # BLD: 0 /100 WBCS — SIGNIFICANT CHANGE UP (ref 0–0)
ORGANISM # SPEC MICROSCOPIC CNT: ABNORMAL
ORGANISM # SPEC MICROSCOPIC CNT: SIGNIFICANT CHANGE UP
PLATELET # BLD AUTO: 165 K/UL — SIGNIFICANT CHANGE UP (ref 150–400)
POTASSIUM SERPL-MCNC: 3.2 MMOL/L — LOW (ref 3.5–5.3)
POTASSIUM SERPL-SCNC: 3.2 MMOL/L — LOW (ref 3.5–5.3)
RBC # BLD: 3.61 M/UL — LOW (ref 3.8–5.2)
RBC # FLD: 15.9 % — HIGH (ref 10.3–14.5)
SODIUM SERPL-SCNC: 139 MMOL/L — SIGNIFICANT CHANGE UP (ref 135–145)
SPECIMEN SOURCE: SIGNIFICANT CHANGE UP
WBC # BLD: 13.86 K/UL — HIGH (ref 3.8–10.5)
WBC # FLD AUTO: 13.86 K/UL — HIGH (ref 3.8–10.5)

## 2024-01-30 PROCEDURE — 36410 VNPNXR 3YR/> PHY/QHP DX/THER: CPT

## 2024-01-30 PROCEDURE — 85610 PROTHROMBIN TIME: CPT

## 2024-01-30 PROCEDURE — 96365 THER/PROPH/DIAG IV INF INIT: CPT

## 2024-01-30 PROCEDURE — 80053 COMPREHEN METABOLIC PANEL: CPT

## 2024-01-30 PROCEDURE — 99239 HOSP IP/OBS DSCHRG MGMT >30: CPT

## 2024-01-30 PROCEDURE — 99291 CRITICAL CARE FIRST HOUR: CPT | Mod: 25

## 2024-01-30 PROCEDURE — 84156 ASSAY OF PROTEIN URINE: CPT

## 2024-01-30 PROCEDURE — C8929: CPT

## 2024-01-30 PROCEDURE — 84484 ASSAY OF TROPONIN QUANT: CPT

## 2024-01-30 PROCEDURE — 84540 ASSAY OF URINE/UREA-N: CPT

## 2024-01-30 PROCEDURE — 88104 CYTOPATH FL NONGYN SMEARS: CPT

## 2024-01-30 PROCEDURE — 36415 COLL VENOUS BLD VENIPUNCTURE: CPT

## 2024-01-30 PROCEDURE — 85652 RBC SED RATE AUTOMATED: CPT

## 2024-01-30 PROCEDURE — 84443 ASSAY THYROID STIM HORMONE: CPT

## 2024-01-30 PROCEDURE — 76937 US GUIDE VASCULAR ACCESS: CPT | Mod: 26,59

## 2024-01-30 PROCEDURE — 76937 US GUIDE VASCULAR ACCESS: CPT

## 2024-01-30 PROCEDURE — 82962 GLUCOSE BLOOD TEST: CPT

## 2024-01-30 PROCEDURE — 85027 COMPLETE CBC AUTOMATED: CPT

## 2024-01-30 PROCEDURE — 96375 TX/PRO/DX INJ NEW DRUG ADDON: CPT

## 2024-01-30 PROCEDURE — 70450 CT HEAD/BRAIN W/O DYE: CPT

## 2024-01-30 PROCEDURE — 84100 ASSAY OF PHOSPHORUS: CPT

## 2024-01-30 PROCEDURE — 99232 SBSQ HOSP IP/OBS MODERATE 35: CPT

## 2024-01-30 PROCEDURE — 83735 ASSAY OF MAGNESIUM: CPT

## 2024-01-30 PROCEDURE — 84133 ASSAY OF URINE POTASSIUM: CPT

## 2024-01-30 PROCEDURE — 92612 ENDOSCOPY SWALLOW (FEES) VID: CPT

## 2024-01-30 PROCEDURE — 82272 OCCULT BLD FECES 1-3 TESTS: CPT

## 2024-01-30 PROCEDURE — 88312 SPECIAL STAINS GROUP 1: CPT

## 2024-01-30 PROCEDURE — 86901 BLOOD TYPING SEROLOGIC RH(D): CPT

## 2024-01-30 PROCEDURE — 86850 RBC ANTIBODY SCREEN: CPT

## 2024-01-30 PROCEDURE — 97161 PT EVAL LOW COMPLEX 20 MIN: CPT

## 2024-01-30 PROCEDURE — 87186 SC STD MICRODIL/AGAR DIL: CPT

## 2024-01-30 PROCEDURE — 87040 BLOOD CULTURE FOR BACTERIA: CPT

## 2024-01-30 PROCEDURE — 93005 ELECTROCARDIOGRAM TRACING: CPT

## 2024-01-30 PROCEDURE — 88305 TISSUE EXAM BY PATHOLOGIST: CPT

## 2024-01-30 PROCEDURE — 73700 CT LOWER EXTREMITY W/O DYE: CPT

## 2024-01-30 PROCEDURE — 80048 BASIC METABOLIC PNL TOTAL CA: CPT

## 2024-01-30 PROCEDURE — 85379 FIBRIN DEGRADATION QUANT: CPT

## 2024-01-30 PROCEDURE — 89051 BODY FLUID CELL COUNT: CPT

## 2024-01-30 PROCEDURE — 73201 CT UPPER EXTREMITY W/DYE: CPT

## 2024-01-30 PROCEDURE — 80307 DRUG TEST PRSMV CHEM ANLYZR: CPT

## 2024-01-30 PROCEDURE — 80202 ASSAY OF VANCOMYCIN: CPT

## 2024-01-30 PROCEDURE — 82553 CREATINE MB FRACTION: CPT

## 2024-01-30 PROCEDURE — 71275 CT ANGIOGRAPHY CHEST: CPT

## 2024-01-30 PROCEDURE — 99232 SBSQ HOSP IP/OBS MODERATE 35: CPT | Mod: GC

## 2024-01-30 PROCEDURE — 97116 GAIT TRAINING THERAPY: CPT

## 2024-01-30 PROCEDURE — 86140 C-REACTIVE PROTEIN: CPT

## 2024-01-30 PROCEDURE — 84439 ASSAY OF FREE THYROXINE: CPT

## 2024-01-30 PROCEDURE — 87205 SMEAR GRAM STAIN: CPT

## 2024-01-30 PROCEDURE — 95708 EEG WO VID EA 12-26HR UNMNTR: CPT

## 2024-01-30 PROCEDURE — 85025 COMPLETE CBC W/AUTO DIFF WBC: CPT

## 2024-01-30 PROCEDURE — 83880 ASSAY OF NATRIURETIC PEPTIDE: CPT

## 2024-01-30 PROCEDURE — 81001 URINALYSIS AUTO W/SCOPE: CPT

## 2024-01-30 PROCEDURE — 73120 X-RAY EXAM OF HAND: CPT

## 2024-01-30 PROCEDURE — 82570 ASSAY OF URINE CREATININE: CPT

## 2024-01-30 PROCEDURE — 84300 ASSAY OF URINE SODIUM: CPT

## 2024-01-30 PROCEDURE — 97110 THERAPEUTIC EXERCISES: CPT

## 2024-01-30 PROCEDURE — 87075 CULTR BACTERIA EXCEPT BLOOD: CPT

## 2024-01-30 PROCEDURE — 83605 ASSAY OF LACTIC ACID: CPT

## 2024-01-30 PROCEDURE — 86900 BLOOD TYPING SEROLOGIC ABO: CPT

## 2024-01-30 PROCEDURE — 96366 THER/PROPH/DIAG IV INF ADDON: CPT

## 2024-01-30 PROCEDURE — 87070 CULTURE OTHR SPECIMN AEROBIC: CPT

## 2024-01-30 PROCEDURE — 84145 PROCALCITONIN (PCT): CPT

## 2024-01-30 PROCEDURE — 36556 INSERT NON-TUNNEL CV CATH: CPT

## 2024-01-30 PROCEDURE — 87637 SARSCOV2&INF A&B&RSV AMP PRB: CPT

## 2024-01-30 PROCEDURE — 71250 CT THORAX DX C-: CPT

## 2024-01-30 PROCEDURE — 85730 THROMBOPLASTIN TIME PARTIAL: CPT

## 2024-01-30 PROCEDURE — 95700 EEG CONT REC W/VID EEG TECH: CPT

## 2024-01-30 PROCEDURE — 83935 ASSAY OF URINE OSMOLALITY: CPT

## 2024-01-30 PROCEDURE — 82550 ASSAY OF CK (CPK): CPT

## 2024-01-30 PROCEDURE — 74176 CT ABD & PELVIS W/O CONTRAST: CPT

## 2024-01-30 PROCEDURE — 72125 CT NECK SPINE W/O DYE: CPT

## 2024-01-30 RX ORDER — FLUCONAZOLE 150 MG/1
1 TABLET ORAL
Qty: 0 | Refills: 0 | DISCHARGE
Start: 2024-01-30

## 2024-01-30 RX ORDER — CEFAZOLIN SODIUM 1 G
2 VIAL (EA) INJECTION
Qty: 0 | Refills: 0 | DISCHARGE
Start: 2024-01-30

## 2024-01-30 RX ORDER — SODIUM CHLORIDE 9 MG/ML
10 INJECTION INTRAMUSCULAR; INTRAVENOUS; SUBCUTANEOUS
Refills: 0 | Status: DISCONTINUED | OUTPATIENT
Start: 2024-01-30 | End: 2024-01-30

## 2024-01-30 RX ORDER — LEVOTHYROXINE SODIUM 125 MCG
1 TABLET ORAL
Qty: 0 | Refills: 0 | DISCHARGE

## 2024-01-30 RX ORDER — CARVEDILOL PHOSPHATE 80 MG/1
1 CAPSULE, EXTENDED RELEASE ORAL
Qty: 0 | Refills: 0 | DISCHARGE

## 2024-01-30 RX ORDER — LATANOPROST 0.05 MG/ML
1 SOLUTION/ DROPS OPHTHALMIC; TOPICAL
Qty: 0 | Refills: 6 | DISCHARGE

## 2024-01-30 RX ORDER — CEPHALEXIN 500 MG
1 CAPSULE ORAL
Qty: 0 | Refills: 0 | DISCHARGE

## 2024-01-30 RX ORDER — RAMIPRIL 5 MG
1 CAPSULE ORAL
Qty: 0 | Refills: 0 | DISCHARGE

## 2024-01-30 RX ORDER — CHLORHEXIDINE GLUCONATE 213 G/1000ML
1 SOLUTION TOPICAL
Refills: 0 | Status: DISCONTINUED | OUTPATIENT
Start: 2024-01-30 | End: 2024-01-30

## 2024-01-30 RX ADMIN — Medication 88 MICROGRAM(S): at 06:24

## 2024-01-30 RX ADMIN — PANTOPRAZOLE SODIUM 40 MILLIGRAM(S): 20 TABLET, DELAYED RELEASE ORAL at 12:51

## 2024-01-30 RX ADMIN — Medication 100 MILLIGRAM(S): at 14:53

## 2024-01-30 RX ADMIN — FLUCONAZOLE 200 MILLIGRAM(S): 150 TABLET ORAL at 18:34

## 2024-01-30 RX ADMIN — CARVEDILOL PHOSPHATE 6.25 MILLIGRAM(S): 80 CAPSULE, EXTENDED RELEASE ORAL at 06:44

## 2024-01-30 RX ADMIN — Medication 100 MILLIGRAM(S): at 06:44

## 2024-01-30 RX ADMIN — CARVEDILOL PHOSPHATE 6.25 MILLIGRAM(S): 80 CAPSULE, EXTENDED RELEASE ORAL at 18:34

## 2024-01-30 NOTE — PROGRESS NOTE ADULT - ASSESSMENT
88F with PMH of glaucoma (Latanoprost), hypothyroidism, HTN, worked up MM, presented to ED due to being found conscious on the ground in her apartment, now admitted to telemetry for work-up of syncope, altered mental status, and further monitoring. Course complicated by 4-5 episodes of melena on 01/24/24, so GI consulted for UGIB.    EGD findings are as follows:   - A medium size hiatal hernia was seen, the esophagogastric junction (EGJ) was noted at 37 cm, with hiatal narrowing at 41 cm from the incisors. Retroflexion view in the stomach confirmed the size and morphology of the hernia as Hill Grade 2.   - Diffuse candidiasis was seen in the lower third of the esophagus and middle third of the esophagus. These findings were suggestive of candidal esophagitis. Samples were obtained for microbiology using a brush. Evaluate for candida esophagitis  - Multiple non-bleeding ulcers were found in the pylorus. Additional findings include These ulcers were consistent with Mj Class III. Multiple cold forceps biopsies were performed for histology in the antrum.  - A post-ulcer gastric deformity was noted in the pre-pyloric region and stomach body. Endoscopic findings were suggestive of healing from prior peptic ulcer disease.  - Multiple non-bleeding ulcers were found in the duodenal bulb. Additional findings include The ulcers were clean-based, consistent with Mj Class III.  - Erythema and congestion of the mucosa with no bleeding was noted in the duodenal bulb. These findings are compatible with duodenitis.    Recommendations:   - Continue pantoprazole 40 mg PO daily   - Start fluconazole 400 mg PO x1, followed by 200 mg PO daily for 14 days   - F/u pathology results from EGD biopsies and brushings   - No contraindication to discharge from GI perspective   - Out-patient follow-up with GI  at [address] [place] [address], [address] [place] [address] (phone: [phone]) with the GI fellows clinic     Case discussed with Dr. Winters. GI Team will sign off. Please re-consult with any questions or concerns.     Jacquelyn Gamble D.O.   Gastroenterology Fellow  Weekday 7am-5pm Pager: [phone]  Weeknights/Weekend/Holiday Coverage: Please call the  for contact info

## 2024-01-30 NOTE — PROGRESS NOTE ADULT - SUBJECTIVE AND OBJECTIVE BOX
INTERVAL EVENTS: No acute events. Had EGD done that showed non bleeding ulcers     PAST MEDICAL & SURGICAL HISTORY:  Diverticulitis    Osteoporosis    Hypothyroidism    HTN (hypertension)    Conway's esophagus    Depression    Glaucoma    History of cataract surgery, right        MEDICATIONS  (STANDING):  carvedilol 6.25 milliGRAM(s) Oral every 12 hours  ceFAZolin   IVPB 2000 milliGRAM(s) IV Intermittent every 8 hours  chlorhexidine 2% Cloths 1 Application(s) Topical <User Schedule>  fluconAZOLE   Tablet 200 milliGRAM(s) Oral every 24 hours  latanoprost 0.005% Ophthalmic Solution 1 Drop(s) Both EYES at bedtime  levothyroxine 88 MICROGram(s) Oral daily  pantoprazole    Tablet 40 milliGRAM(s) Oral every 12 hours    MEDICATIONS  (PRN):  acetaminophen     Tablet .. 650 milliGRAM(s) Oral every 6 hours PRN Temp greater or equal to 38C (100.4F), Mild Pain (1 - 3)  aluminum hydroxide/magnesium hydroxide/simethicone Suspension 30 milliLiter(s) Oral every 4 hours PRN Dyspepsia  melatonin 3 milliGRAM(s) Oral at bedtime PRN Insomnia  ondansetron Injectable 4 milliGRAM(s) IV Push every 8 hours PRN Nausea and/or Vomiting  sodium chloride 0.9% lock flush 10 milliLiter(s) IV Push every 1 hour PRN Pre/post blood products, medications, blood draw, and to maintain line patency    Vital Signs Last 24 Hrs  T(C): 36.2 (30 Jan 2024 13:27), Max: 36.8 (30 Jan 2024 05:48)  T(F): 97.2 (30 Jan 2024 13:27), Max: 98.3 (30 Jan 2024 05:48)  HR: 66 (30 Jan 2024 13:27) (66 - 85)  BP: 143/77 (30 Jan 2024 13:27) (140/67 - 163/80)  BP(mean): 112 (30 Jan 2024 05:48) (112 - 112)  RR: 16 (30 Jan 2024 13:27) (16 - 18)  SpO2: 97% (30 Jan 2024 13:27) (94% - 97%)    Parameters below as of 30 Jan 2024 13:27  Patient On (Oxygen Delivery Method): room air        PHYSICAL EXAM:  GEN: Awake, alert. NAD.   HEENT: NCAT, PERRL, EOMI. Mucosa moist. No JVD.  RESP: CTA b/l  CV: RRR. Normal S1/S2. No m/r/g.  ABD: Soft. NT/ND. BS+  EXT: Warm. No edema, clubbing, or cyanosis.   NEURO: AAOx3. No focal deficits.     LABS:                        11.7   13.86 )-----------( 165      ( 30 Jan 2024 05:30 )             34.6     01-30    139  |  106  |  6<L>  ----------------------------<  89  3.2<L>   |  22  |  0.64    Ca    8.1<L>      30 Jan 2024 05:30  Phos  3.3     01-29  Mg     2.0     01-29          PT/INR - ( 29 Jan 2024 06:31 )   PT: 13.1 sec;   INR: 1.15          PTT - ( 29 Jan 2024 06:31 )  PTT:27.6 sec  Urinalysis Basic - ( 30 Jan 2024 05:30 )    Color: x / Appearance: x / SG: x / pH: x  Gluc: 89 mg/dL / Ketone: x  / Bili: x / Urobili: x   Blood: x / Protein: x / Nitrite: x   Leuk Esterase: x / RBC: x / WBC x   Sq Epi: x / Non Sq Epi: x / Bacteria: x      I&O's Summary    29 Jan 2024 07:01  -  30 Jan 2024 07:00  --------------------------------------------------------  IN: 0 mL / OUT: 900 mL / NET: -900 mL    30 Jan 2024 07:01  -  30 Jan 2024 15:00  --------------------------------------------------------  IN: 0 mL / OUT: 200 mL / NET: -200 mL      RADIOLOGY & ADDITIONAL STUDIES:  TELEMETRY:  EKG:

## 2024-01-30 NOTE — PROGRESS NOTE ADULT - NUTRITIONAL ASSESSMENT
This patient has been assessed with a concern for Malnutrition and has been determined to have a diagnosis/diagnoses of Severe protein-calorie malnutrition.    This patient is being managed with:   Diet Regular-  Soft and Bite Sized (SOFTBTSZ)  Mildly Thick Liquids (MILDTHICKLIQS)  Entered: Jan 29 2024  6:54PM

## 2024-01-30 NOTE — PROGRESS NOTE ADULT - REASON FOR ADMISSION
syncope/fall

## 2024-01-30 NOTE — PROGRESS NOTE ADULT - SUBJECTIVE AND OBJECTIVE BOX
INTERVAL HPI/OVERNIGHT EVENTS: RON overnight, denies fresh somatic complaints.     CONSTITUTIONAL:  No fever, chills, night sweats  EYES:  No photophobia or visual changes  CARDIOVASCULAR:  No chest pain  RESPIRATORY:  No cough, wheezing, or SOB   GASTROINTESTINAL:  No nausea, vomiting, diarrhea, constipation, or abdominal pain  GENITOURINARY:  No frequency, urgency, dysuria or hematuria  NEUROLOGIC:  No headache, lightheadedness      ANTIBIOTICS/RELEVANT:  ceFAZolin   IVPB   100 mL/Hr IV Intermittent (01-30-24 @ 14:53)   100 mL/Hr IV Intermittent (01-30-24 @ 06:44)   100 mL/Hr IV Intermittent (01-29-24 @ 23:12)   100 mL/Hr IV Intermittent (01-29-24 @ 15:05)   100 mL/Hr IV Intermittent (01-29-24 @ 06:24)   100 mL/Hr IV Intermittent (01-28-24 @ 23:06)    fluconAZOLE   Tablet   200 milliGRAM(s) Oral (01-30-24 @ 18:34)    fluconAZOLE IVPB   100 mL/Hr IV Intermittent (01-29-24 @ 18:43)    piperacillin/tazobactam IVPB..   25 mL/Hr IV Intermittent (01-28-24 @ 13:11)   25 mL/Hr IV Intermittent (01-28-24 @ 06:20)   25 mL/Hr IV Intermittent (01-27-24 @ 21:42)   25 mL/Hr IV Intermittent (01-27-24 @ 13:27)   25 mL/Hr IV Intermittent (01-27-24 @ 06:12)   25 mL/Hr IV Intermittent (01-26-24 @ 21:55)   25 mL/Hr IV Intermittent (01-26-24 @ 12:29)   25 mL/Hr IV Intermittent (01-26-24 @ 05:27)   25 mL/Hr IV Intermittent (01-25-24 @ 21:23)   25 mL/Hr IV Intermittent (01-25-24 @ 14:15)   25 mL/Hr IV Intermittent (01-25-24 @ 06:01)   25 mL/Hr IV Intermittent (01-24-24 @ 21:49)   25 mL/Hr IV Intermittent (01-24-24 @ 15:27)   25 mL/Hr IV Intermittent (01-24-24 @ 06:32)    vancomycin  IVPB   250 mL/Hr IV Intermittent (01-28-24 @ 10:46)   250 mL/Hr IV Intermittent (01-27-24 @ 10:58)            Vital Signs Last 24 Hrs  T(C): 36.8 (30 Jan 2024 20:30), Max: 36.8 (30 Jan 2024 05:48)  T(F): 98.3 (30 Jan 2024 20:30), Max: 98.3 (30 Jan 2024 05:48)  HR: 77 (30 Jan 2024 20:30) (66 - 77)  BP: 155/83 (30 Jan 2024 20:30) (143/77 - 179/83)  BP(mean): 112 (30 Jan 2024 05:48) (112 - 112)  RR: 16 (30 Jan 2024 20:30) (16 - 18)  SpO2: 96% (30 Jan 2024 20:30) (96% - 97%)    Parameters below as of 30 Jan 2024 20:30  Patient On (Oxygen Delivery Method): room air        PHYSICAL EXAM:  Constitutional:  Alert, elderly female, NAD, hard of hearing  Eyes:  Sclerae anicteric, conjunctivae clear, PERRL  Ear/Nose/Throat:  No nasal exudate or sinus tenderness  Neck:  Supple, no adenopathy  Respiratory:  Clear bilaterally  Cardiovascular:  RRR, S1S2, no murmur appreciated  Gastrointestinal:  Symmetric, normoactive BS, soft, NT  Extremities:  No edema      LABS:                        11.7   13.86 )-----------( 165      ( 30 Jan 2024 05:30 )             34.6         01-30    139  |  106  |  6<L>  ----------------------------<  89  3.2<L>   |  22  |  0.64    Ca    8.1<L>      30 Jan 2024 05:30  Phos  3.3     01-29  Mg     2.0     01-29        Urinalysis Basic - ( 30 Jan 2024 05:30 )    Color: x / Appearance: x / SG: x / pH: x  Gluc: 89 mg/dL / Ketone: x  / Bili: x / Urobili: x   Blood: x / Protein: x / Nitrite: x   Leuk Esterase: x / RBC: x / WBC x   Sq Epi: x / Non Sq Epi: x / Bacteria: x        MICROBIOLOGY:    Culture - Blood (collected 28 Jan 2024 05:30)  Source: .Blood Blood  Preliminary Report (30 Jan 2024 12:00):    No growth at 2 days.    Culture - Body Fluid with Gram Stain (collected 26 Jan 2024 22:00)  Source: .Body Fluid hand abcess fluid  Gram Stain (27 Jan 2024 05:21):    Few Gram positive cocci in pairs    Moderate to Numerous White blood cells  Final Report (30 Jan 2024 09:26):    Moderate Staphylococcus aureus  Organism: Staphylococcus aureus (30 Jan 2024 09:26)  Organism: Staphylococcus aureus (30 Jan 2024 09:26)    Urinalysis with Rflx Culture (collected 23 Jan 2024 03:04)    Culture - Blood (collected 22 Jan 2024 18:56)  Source: .Blood Blood-Peripheral  Final Report (28 Jan 2024 01:01):    No growth at 5 days    Culture - Blood (collected 22 Jan 2024 18:56)  Source: .Blood Blood-Peripheral  Final Report (28 Jan 2024 01:01):    No growth at 5 days          RADIOLOGY & ADDITIONAL STUDIES: reviewed

## 2024-01-30 NOTE — PROGRESS NOTE ADULT - ASSESSMENT
89 yo F with Glaucoma, Hypothyroidism, HTN, diverticulitis, jensen's esophagus, ?MGUS/MM presented from [facility]. PIV reportedly blew and that triggered ortho consult. L hand CT showed Rim-enhancing fluid collection communicating with the underlying   extensor tendons including the second, third, and fourth extensor compartments s/p bedside I&D. 1/22 BCx ngtd. 1/26 abscess fluid cx with MSSA. 1/28 BCx ngtd.  EGD findings noted with c/f Candidal esophagitits in lower 1/3rd of esophagus.   Suggest  - Continue IV Cefazolin 2gm Q8H; will likely need this for 4 weeks since I&D for tenosynovitis  - Abx stop date: 2/22/24  - Please fax weekly OPAT labs incl. CBC w diff, CMP, ESR and CRP to Dr. Ash [phone]  - ID Office f.u with me in 2 weeks at [address], [place] [address]  or [address] [address]  - QTc 447   - Given Fluconazole 400 mg x 1 yesterday  - PO Fluconazole 200 mg Q24H x 20 days  - Fluconazole stop date: 2/18/24    We will sign off, call us back as needed.

## 2024-01-30 NOTE — PROGRESS NOTE ADULT - ATTENDING SUPERVISION STATEMENT
Resident
Resident
Fellow
Resident

## 2024-01-30 NOTE — PROGRESS NOTE ADULT - ATTENDING COMMENTS
Patient seen, examined, and discussed with Dr. Gamble. Agree with above. 88F with a h/o glaucoma (Latanoprost), hypothyroidism, HTN, worked up MM, presented to ED due to being found conscious on the ground in her apartment, now admitted to telemetry for work-up of syncope, altered mental status, and further monitoring. Course complicated by 4-5 episodes of melena on 01/24/24, so GI consulted for UGIB. S/p EGD with candida esophagitis, clean based gastric ulcers, and duodenitis. PPI once daily, fluconazole for candida. F/u pathology. Outpatient follow up. Will sign off, please call back with questions or concerns.     Remy Winters MD  Gastroenterology

## 2024-01-30 NOTE — PROGRESS NOTE ADULT - ASSESSMENT
87YO F with PMHx of HTN, HLD, PAD, Hypothyroidism, monoclonal gammopathy who presented to [facility] after being found on the ground in her apartment without syncopal event. Admitted to medicine telemetry for rhabdomyolysis, AMS and multiple electrolyte derangements. Cardiology consulted for frequent PVC's. Also found to have paroxysmal Afib.     REVIEW OF STUDIES:   No tele available for review   ECG: Sinus tachycardia with bigeminy. Normal axis. No ischemic changes  TTE 1/23: Normal LV function, no high gradients across LVOT ( 12mmHg) suggestive of LVOT obstruction, Grade 1 DD, mild MR, trace TR, PASP 32mmHg     #Asymptomatic PVC's   Normal LV function with frequent PVC's ( couplets/ bigeminy/ isolated PVC's)   She is asymptomatic at this time   Switched to coreg for HTN and PVC's/ AFIB. Increase dose to 12.5mg BID upon d/c   Keep K > 4 and Magnesium > 2    #Paroxysmal Afib   Brief episodes of Afib noted on [facility] while patient was on tele. She has no history of AF  Continue with coreg as above  CHADVASC 4 ( age/ gender/ vascular disease). Her nieces refused anticoagulation.   Recommend discussing AC moving forward once patient is discharged and follows up as OP if cleared from GI    Patient can follow up with Dr. Clifton Elena (her PCP and Cardiologist) upon discharge.

## 2024-01-30 NOTE — PROGRESS NOTE ADULT - SUBJECTIVE AND OBJECTIVE BOX
GI Consult Progress Note:     OVERNIGHT EVENTS: RON.    SUBJECTIVE / INTERVAL HPI:   Patient seen and examined at bedside. Reports that she feels well. No acute complaints at this time. Discussed findings of EGD and how to take pantoprazole medication.       VITAL SIGNS:  Vital Signs Last 24 Hrs  T(C): 36.8 (30 Jan 2024 05:48), Max: 36.8 (30 Jan 2024 05:48)  T(F): 98.3 (30 Jan 2024 05:48), Max: 98.3 (30 Jan 2024 05:48)  HR: 68 (30 Jan 2024 05:48) (68 - 85)  BP: 160/88 (30 Jan 2024 05:48) (140/67 - 168/80)  BP(mean): 112 (30 Jan 2024 05:48) (99 - 112)  RR: 18 (30 Jan 2024 05:48) (16 - 18)  SpO2: 97% (30 Jan 2024 05:48) (94% - 97%)    Parameters below as of 30 Jan 2024 05:48  Patient On (Oxygen Delivery Method): room air        01-29-24 @ 07:01 - 01-30-24 @ 07:00  --------------------------------------------------------  IN: 0 mL / OUT: 900 mL / NET: -900 mL    01-30-24 @ 07:01 - 01-30-24 @ 11:32  --------------------------------------------------------  IN: 0 mL / OUT: 200 mL / NET: -200 mL      PHYSICAL EXAM:  General: No acute distress, hard of hearing   Lungs: Normal respiratory effort and no intercostal retractions  Cardiovascular: RRR  Abdomen: Soft, non-tender, non-distended  Neurological: Alert and oriented x3  Skin: Warm and dry. UE in bandages       MEDICATIONS:  MEDICATIONS  (STANDING):  carvedilol 6.25 milliGRAM(s) Oral every 12 hours  ceFAZolin   IVPB 2000 milliGRAM(s) IV Intermittent every 8 hours  fluconAZOLE   Tablet 200 milliGRAM(s) Oral every 24 hours  latanoprost 0.005% Ophthalmic Solution 1 Drop(s) Both EYES at bedtime  levothyroxine 88 MICROGram(s) Oral daily  pantoprazole    Tablet 40 milliGRAM(s) Oral every 12 hours    MEDICATIONS  (PRN):  acetaminophen     Tablet .. 650 milliGRAM(s) Oral every 6 hours PRN Temp greater or equal to 38C (100.4F), Mild Pain (1 - 3)  aluminum hydroxide/magnesium hydroxide/simethicone Suspension 30 milliLiter(s) Oral every 4 hours PRN Dyspepsia  melatonin 3 milliGRAM(s) Oral at bedtime PRN Insomnia  ondansetron Injectable 4 milliGRAM(s) IV Push every 8 hours PRN Nausea and/or Vomiting      ALLERGIES:  Allergies    Flagyl (Other)    Intolerances    Augmentin (Diarrhea)  NSAIDs (Other)  dairy products (Diarrhea)  Cipro (Diarrhea)      LABS:                        11.7   13.86 )-----------( 165      ( 30 Jan 2024 05:30 )             34.6     01-30    139  |  106  |  6<L>  ----------------------------<  89  3.2<L>   |  22  |  0.64    Ca    8.1<L>      30 Jan 2024 05:30  Phos  3.3     01-29  Mg     2.0     01-29      PT/INR - ( 29 Jan 2024 06:31 )   PT: 13.1 sec;   INR: 1.15          PTT - ( 29 Jan 2024 06:31 )  PTT:27.6 sec  Urinalysis Basic - ( 30 Jan 2024 05:30 )    Color: x / Appearance: x / SG: x / pH: x  Gluc: 89 mg/dL / Ketone: x  / Bili: x / Urobili: x   Blood: x / Protein: x / Nitrite: x   Leuk Esterase: x / RBC: x / WBC x   Sq Epi: x / Non Sq Epi: x / Bacteria: x      CAPILLARY BLOOD GLUCOSE  RADIOLOGY & ADDITIONAL TESTS: Reviewed.

## 2024-01-30 NOTE — PROGRESS NOTE ADULT - TIME BILLING
Tenosynovitis, MSSA
As above
Candida esophagitis, tenosynovitis
Candidal esophagitis, tenosynovitis, MSSA
Time spent includes direct patient care  (interview and examination of patient), discussion with other providers, support staff and/or patient's family members, review of medical records, ordering diagnostic tests and analyzing results, and documentation.
Time spent includes direct patient care  (interview and examination of patient), discussion with other providers, support staff and/or patient's family members, review of medical records, ordering diagnostic tests and analyzing results, and documentation.
As above
Time spent includes direct patient care  (interview and examination of patient), discussion with other providers, support staff and/or patient's family members, review of medical records, ordering diagnostic tests and analyzing results, and documentation.

## 2024-01-30 NOTE — PROGRESS NOTE ADULT - ATTENDING COMMENTS
Patient is an 87 yo Female with PMHx of  PAD, HTN, HLD,  Hypothyroidism, monoclonal gammopathy being evaluated for MM, who presented to [facility] after being found on the ground in her apartment without loss of consciousness,  found to be in rhabdomyolysis, with AMS and electrolyte derangements with ventricular bigeminy and subsequently Afib. Cardiology originally consulted for Ectopy    REVIEW OF STUDIES:   - ECG 01/23/2024: Sinus tachycardia with bigeminy. Normal axis. No ischemic changes  - Tele 01/22-23/2024: Ventricular bigeminy and frequent couplets and PVCs  - TTE 1/23/2024: Normal LV function, no high gradients across LVOT ( 12mmHg) suggestive of LVOT obstruction, Grade 1 DD, mild MR, trace TR, PASP 32mmHg     # Ventricular Ectopy; pAfib  - Patient with no known CVD, followed by outpatient Cardiologist at [facility], Dr Clifton Elena, admitted with Rhabdomyolysis after sustaining fall with  prolonged immobilization, clinically improved  - EKGs on admission reviewed showing NSR with frequent PVCS without ischemic changes  - Tele this hospitalization was notable for Frequent PVC, Ventricular bigeminy and Afib with RVR with return to NSR  - ECHO this hospitalization reviewed showing normal Biv function without RWMA and intracavitary gradient of 12 mmhG  - Please increase Coreg to 12.5 mg po BID with goal to uptitrate as tolerated for better BP and rate control  - Patient with CHADVASC of at least 5 placing her at higher thromboembolic risk. However patient with repeat melena this hospitalization S/p EGD with candida esophagitis, clean based gastric ulcers, and duodenitis. She and her nieces have opted to defer AC for now  - Given Intracavitary gradient additionally would avoid diuretics and maintain patient euvolemic  - Please ensure patient has outpatient Cards follow up with Dr Clifton Elena her Cardiologist within 2 weeks of DC  - Please reconsult Cardiology as needed

## 2024-01-30 NOTE — PROGRESS NOTE ADULT - PROVIDER SPECIALTY LIST ADULT
Cardiology
Infectious Disease
Internal Medicine
Internal Medicine
Orthopedics
Cardiology
Cardiology
Infectious Disease
Internal Medicine
Internal Medicine
Orthopedics
Cardiology
Gastroenterology
Gastroenterology
Orthopedics
Gastroenterology
Rehab Medicine
Rehab Medicine
Infectious Disease
Internal Medicine
Internal Medicine
Hospitalist
Internal Medicine

## 2024-01-30 NOTE — DISCHARGE NOTE NURSING/CASE MANAGEMENT/SOCIAL WORK - PATIENT PORTAL LINK FT
You can access the FollowMyHealth Patient Portal offered by [facility] by registering at the following website: [email or web address]. By joining [facility]’s FollowMyHealth portal, you will also be able to view your health information using other applications (apps) compatible with our system.

## 2024-01-31 LAB
NON-GYNECOLOGICAL CYTOLOGY STUDY: SIGNIFICANT CHANGE UP
SURGICAL PATHOLOGY STUDY: SIGNIFICANT CHANGE UP

## 2024-02-02 LAB
CULTURE RESULTS: SIGNIFICANT CHANGE UP
SPECIMEN SOURCE: SIGNIFICANT CHANGE UP

## 2024-02-08 DIAGNOSIS — I49.3 VENTRICULAR PREMATURE DEPOLARIZATION: ICD-10-CM

## 2024-02-08 DIAGNOSIS — L89.891 PRESSURE ULCER OF OTHER SITE, STAGE 1: ICD-10-CM

## 2024-02-08 DIAGNOSIS — Z11.52 ENCOUNTER FOR SCREENING FOR COVID-19: ICD-10-CM

## 2024-02-08 DIAGNOSIS — K44.9 DIAPHRAGMATIC HERNIA WITHOUT OBSTRUCTION OR GANGRENE: ICD-10-CM

## 2024-02-08 DIAGNOSIS — E86.0 DEHYDRATION: ICD-10-CM

## 2024-02-08 DIAGNOSIS — Z87.891 PERSONAL HISTORY OF NICOTINE DEPENDENCE: ICD-10-CM

## 2024-02-08 DIAGNOSIS — E87.20 ACIDOSIS, UNSPECIFIED: ICD-10-CM

## 2024-02-08 DIAGNOSIS — Z66 DO NOT RESUSCITATE: ICD-10-CM

## 2024-02-08 DIAGNOSIS — G92.8 OTHER TOXIC ENCEPHALOPATHY: ICD-10-CM

## 2024-02-08 DIAGNOSIS — I48.0 PAROXYSMAL ATRIAL FIBRILLATION: ICD-10-CM

## 2024-02-08 DIAGNOSIS — L89.321 PRESSURE ULCER OF LEFT BUTTOCK, STAGE 1: ICD-10-CM

## 2024-02-08 DIAGNOSIS — B37.81 CANDIDAL ESOPHAGITIS: ICD-10-CM

## 2024-02-08 DIAGNOSIS — I24.89 OTHER FORMS OF ACUTE ISCHEMIC HEART DISEASE: ICD-10-CM

## 2024-02-08 DIAGNOSIS — K26.9 DUODENAL ULCER, UNSPECIFIED AS ACUTE OR CHRONIC, WITHOUT HEMORRHAGE OR PERFORATION: ICD-10-CM

## 2024-02-08 DIAGNOSIS — Z79.890 HORMONE REPLACEMENT THERAPY: ICD-10-CM

## 2024-02-08 DIAGNOSIS — M65.9 SYNOVITIS AND TENOSYNOVITIS, UNSPECIFIED: ICD-10-CM

## 2024-02-08 DIAGNOSIS — D47.2 MONOCLONAL GAMMOPATHY: ICD-10-CM

## 2024-02-08 DIAGNOSIS — R13.13 DYSPHAGIA, PHARYNGEAL PHASE: ICD-10-CM

## 2024-02-08 DIAGNOSIS — E87.0 HYPEROSMOLALITY AND HYPERNATREMIA: ICD-10-CM

## 2024-02-08 DIAGNOSIS — M65.142 OTHER INFECTIVE (TENO)SYNOVITIS, LEFT HAND: ICD-10-CM

## 2024-02-08 DIAGNOSIS — B95.61 METHICILLIN SUSCEPTIBLE STAPHYLOCOCCUS AUREUS INFECTION AS THE CAUSE OF DISEASES CLASSIFIED ELSEWHERE: ICD-10-CM

## 2024-02-08 DIAGNOSIS — J47.9 BRONCHIECTASIS, UNCOMPLICATED: ICD-10-CM

## 2024-02-08 DIAGNOSIS — Z74.2 NEED FOR ASSISTANCE AT HOME AND NO OTHER HOUSEHOLD MEMBER ABLE TO RENDER CARE: ICD-10-CM

## 2024-02-08 DIAGNOSIS — H40.9 UNSPECIFIED GLAUCOMA: ICD-10-CM

## 2024-02-08 DIAGNOSIS — M62.82 RHABDOMYOLYSIS: ICD-10-CM

## 2024-02-08 DIAGNOSIS — L89.311 PRESSURE ULCER OF RIGHT BUTTOCK, STAGE 1: ICD-10-CM

## 2024-02-08 DIAGNOSIS — L02.512 CUTANEOUS ABSCESS OF LEFT HAND: ICD-10-CM

## 2024-02-08 DIAGNOSIS — R65.20 SEVERE SEPSIS WITHOUT SEPTIC SHOCK: ICD-10-CM

## 2024-02-08 DIAGNOSIS — A41.9 SEPSIS, UNSPECIFIED ORGANISM: ICD-10-CM

## 2024-02-08 DIAGNOSIS — E03.9 HYPOTHYROIDISM, UNSPECIFIED: ICD-10-CM

## 2024-02-08 DIAGNOSIS — K25.4 CHRONIC OR UNSPECIFIED GASTRIC ULCER WITH HEMORRHAGE: ICD-10-CM

## 2024-02-08 DIAGNOSIS — Z63.4 DISAPPEARANCE AND DEATH OF FAMILY MEMBER: ICD-10-CM

## 2024-02-08 DIAGNOSIS — C90.00 MULTIPLE MYELOMA NOT HAVING ACHIEVED REMISSION: ICD-10-CM

## 2024-02-08 DIAGNOSIS — Z88.1 ALLERGY STATUS TO OTHER ANTIBIOTIC AGENTS STATUS: ICD-10-CM

## 2024-02-08 DIAGNOSIS — R55 SYNCOPE AND COLLAPSE: ICD-10-CM

## 2024-02-08 DIAGNOSIS — I10 ESSENTIAL (PRIMARY) HYPERTENSION: ICD-10-CM

## 2024-02-08 DIAGNOSIS — J43.9 EMPHYSEMA, UNSPECIFIED: ICD-10-CM

## 2024-02-08 SDOH — SOCIAL STABILITY - SOCIAL INSECURITY: DISSAPEARANCE AND DEATH OF FAMILY MEMBER: Z63.4

## 2024-02-12 ENCOUNTER — APPOINTMENT (OUTPATIENT)
Dept: INFECTIOUS DISEASE | Facility: CLINIC | Age: 89
End: 2024-02-12

## 2024-02-12 ENCOUNTER — APPOINTMENT (OUTPATIENT)
Dept: GASTROENTEROLOGY | Facility: CLINIC | Age: 89
End: 2024-02-12

## 2024-02-13 ENCOUNTER — APPOINTMENT (OUTPATIENT)
Dept: INFECTIOUS DISEASE | Facility: CLINIC | Age: 89
End: 2024-02-13
Payer: MEDICARE

## 2024-02-13 ENCOUNTER — APPOINTMENT (OUTPATIENT)
Dept: INFECTIOUS DISEASE | Facility: CLINIC | Age: 89
End: 2024-02-13

## 2024-02-13 DIAGNOSIS — Z86.39 PERSONAL HISTORY OF OTHER ENDOCRINE, NUTRITIONAL AND METABOLIC DISEASE: ICD-10-CM

## 2024-02-13 DIAGNOSIS — Z87.19 PERSONAL HISTORY OF OTHER DISEASES OF THE DIGESTIVE SYSTEM: ICD-10-CM

## 2024-02-13 DIAGNOSIS — Z86.69 PERSONAL HISTORY OF OTHER DISEASES OF THE NERVOUS SYSTEM AND SENSE ORGANS: ICD-10-CM

## 2024-02-13 DIAGNOSIS — Z86.79 PERSONAL HISTORY OF OTHER DISEASES OF THE CIRCULATORY SYSTEM: ICD-10-CM

## 2024-02-13 DIAGNOSIS — B37.81 CANDIDAL ESOPHAGITIS: ICD-10-CM

## 2024-02-13 DIAGNOSIS — L02.91 CUTANEOUS ABSCESS, UNSPECIFIED: ICD-10-CM

## 2024-02-13 DIAGNOSIS — M65.9 SYNOVITIS AND TENOSYNOVITIS, UNSPECIFIED: ICD-10-CM

## 2024-02-13 DIAGNOSIS — Z86.03 PERSONAL HISTORY OF NEOPLASM OF UNCERTAIN BEHAVIOR: ICD-10-CM

## 2024-02-13 PROCEDURE — 99214 OFFICE O/P EST MOD 30 MIN: CPT

## 2024-02-13 RX ORDER — CEFAZOLIN 2 G/1
2 INJECTION, POWDER, FOR SOLUTION INTRAVENOUS
Refills: 0 | Status: ACTIVE | COMMUNITY

## 2024-02-13 RX ORDER — FLUCONAZOLE 200 MG/1
200 TABLET ORAL DAILY
Qty: 6 | Refills: 0 | Status: ACTIVE | COMMUNITY
Start: 2024-02-13

## 2024-02-13 NOTE — ASSESSMENT
[FreeTextEntry1] : 89 yo F with Glaucoma, Hypothyroidism, HTN, diverticulitis, jensen's esophagus, ?MGUS/MM who was admitted to [facility] 1/22-1/30/24 after she presented from [facility]. She reportedly did not  her newspapers and her bldg. superintendent found her on the floor of her apartment awake with multiple bruises. She was admitted for fall, SIRS, metabolic encephalopathy and her hospital course was c/b A.fib, ventricular ectopy and melena. Her peripheral IV line reportedly blew and that triggered an ortho consult. 1/26/24 L hand CT showed Rim-enhancing fluid collection communicating with the underlying extensor tendons including the second, third, and fourth extensor compartments, superficial thrombophlebitis and no CT e/o OM. S/p bedside I&D done by ortho on 1/27, noted to have sanguinous drainage with pus. 1/22 BCx neg. 1/26 abscess fluid cx with MSSA. 1/28 BCx neg. She underwent an EGD findings noted with c/f Candidal esophagitits in lower 1/3rd of esophagus. She was treated with IV Levofloxacin 750 mg x 1 (1/22/24) --> IV Ceftriaxone 1gm x 1 (1/23/24) --> IV Piptazo 4.5gm Q8H (1/23-1/28/24), IV Vancomycin 1gm Q24H (1/27-1/28/24) --> IV Cefazolin 2gm Q8H (started 1/28 at ~2300). For the candidal esophagitis, she received IV Fluconazole 400 mg x 1 on 1/29 and was then placed on PO Flucoanzole 200 mg Q24H. QTc was 447 msec. We recc IV Cefazolin 2gm Q8H x 4 weeks since I&D for tenosynovitis with tentative Abx stop date: 2/22/24. We also recc PO Fluconazole 200 mg Q24H x 20 days since discharge with PO Fluconazole stop date: 2/18/24. 2/7 CBC with WNL WBC abd SCr, LFTs. ESR and CRP not done. Pt seen via telehealth today. Denies somatic complaints. She is accompanied by her RN Marissa and West Morton, her brother-in-law, whose daughter is pts HCP. RN reports improved L hand swelling with residual swelling over knuckles. RN is unsure if pt received Fluconazole, she checked their EMR and did not find an order. Will speak to her nurse manager and get back to me with details on this.  L hand abscess and tenosynovitis (MSSA on cx) s/p I&D - OPAT labs did not include ESR and CRP, requested these in addition to CBC w diff and CMP - Plan for a total of 4 wks of IV Cefazolin with Abx stop date 2/22/24 if inflammatory markers downtrending  Candidal esophagitis - Received fluconazole loading dose on 1/29 and PO Fluconazole 200 mg on 1/30 - Unclear if received the remainder of her course (until 2/18) - Awaiting further info from Priscila Fish - If she has not received Fluconazole, will treat  - F/u 2-3 wks  [Treatment Education] : treatment education [Treatment Adherence] : treatment adherence [Anticipatory Guidance] : anticipatory guidance

## 2024-02-13 NOTE — REASON FOR VISIT
[Other Location: e.g. School (Enter Location, City,State)___] : at [unfilled], at the time of the visit. [Other Location: e.g. Home (Enter Location, City,State)___] : at [unfilled] [Patient] : the patient [Self] : self [Post Hospitalization] : a post hospitalization visit [Formal Caregiver] : formal caregiver

## 2024-02-13 NOTE — HISTORY OF PRESENT ILLNESS
[FreeTextEntry1] : 87 yo F with Glaucoma, Hypothyroidism, HTN, diverticulitis, jensen's esophagus, ?MGUS/MM who was admitted to [facility] 1/22-1/30/24 after she presented from [facility]. She reportedly did not  her newspapers and her bldg. superintendent found her on the floor of her apartment awake with multiple bruises. She  was admitted for fall, SIRS, metabolic encephalopathy and her hospital course was c/b A.fib, ventricular ectopy and melena.  Her peripheral IV line reportedly blew and that triggered an ortho consult. 1/26/24 L hand CT showed Rim-enhancing fluid collection communicating with the underlying extensor tendons including the second, third, and fourth extensor compartments, superficial thrombophlebitis and no CT e/o OM.  S/p bedside I&D done by ortho on 1/27, noted to have sanguinous drainage with pus.  1/22 BCx neg. 1/26 abscess fluid cx with MSSA. 1/28 BCx neg. She underwent an EGD findings noted with c/f Candidal esophagitits in lower 1/3rd of esophagus.  She was treated with IV Levofloxacin 750 mg x 1 (1/22/24) --> IV Ceftriaxone 1gm x 1 (1/23/24) --> IV Piptazo 4.5gm Q8H (1/23-1/28/24), IV Vancomycin 1gm Q24H (1/27-1/28/24) --> IV Cefazolin 2gm Q8H (started 1/28 at ~2300). For the candidal esophagitis, she received IV Fluconazole 400 mg x 1 on 1/29 and was then placed on PO Flucoanzole 200 mg Q24H.  QTc was 447 msec. We recc IV Cefazolin 2gm Q8H x 4 weeks since I&D for tenosynovitis with tentative Abx stop date: 2/22/24.  We also recc PO Fluconazole 200 mg Q24H x 20 days since disacharge with PO Fluconazole stop date: 2/18/24.  2/7 CBC with WNL WBC abd SCr, LFTs.  ESR and CRP not done.  Pt seen via telehealth today. Denies somatic complaints. She is accompanied by her RN Marissa and West Morton, her brother-in-law, whose daughter is pts HCP.  RN reports improved L hand swelling with residual swelling over knuckles.  RN is unsure if pt received Fluconazole, she checked their EMR and did not find an order. Will speak to her nurse manager and get back to me with details on this.

## 2024-02-13 NOTE — DATA REVIEWED
[FreeTextEntry1] : 1/26 L hand CT w IVC IMPRESSION:  1.  Soft tissue swelling is nonspecific but can be seen with cellulitis. 2.  Rim-enhancing fluid collection communicating with the underlying  extensor tendons including the second, third, and fourth extensor  compartments. The findings are concerning for communicating infectious or  inflammatory fluid collection with associated tenosynovitis. 3.  Suspect high-grade tearing of the extensor tendon to the index finger 4.  Occluded vein along the dorsum of the wrist at the level of the fluid  collections suggestive of superficial thrombophlebitis 5.  No CT evidence for osteomyelitis.

## 2024-02-13 NOTE — PHYSICAL EXAM
[General Appearance - Alert] : alert [General Appearance - In No Acute Distress] : in no acute distress [Sclera] : the sclera and conjunctiva were normal [Neck Appearance] : the appearance of the neck was normal [FreeTextEntry1] : as above [Oriented To Time, Place, And Person] : oriented to person, place, and time

## 2024-02-20 ENCOUNTER — APPOINTMENT (OUTPATIENT)
Dept: ORTHOPEDIC SURGERY | Facility: CLINIC | Age: 89
End: 2024-02-20
Payer: COMMERCIAL

## 2024-02-20 VITALS — RESPIRATION RATE: 16 BRPM | HEIGHT: 68 IN | BODY MASS INDEX: 18.19 KG/M2 | WEIGHT: 120 LBS

## 2024-02-20 DIAGNOSIS — L03.90 CELLULITIS, UNSPECIFIED: ICD-10-CM

## 2024-02-20 DIAGNOSIS — L02.91 CELLULITIS, UNSPECIFIED: ICD-10-CM

## 2024-02-20 PROCEDURE — 73130 X-RAY EXAM OF HAND: CPT | Mod: LT

## 2024-02-20 PROCEDURE — 99204 OFFICE O/P NEW MOD 45 MIN: CPT

## 2024-02-20 NOTE — PHYSICAL EXAM
[de-identified] : Physical exam demonstrates the patient to be alert and oriented x 3 and capable of ambulation. The patient is well-developed and well-nourished in no apparent respiratory distress. The majority of the skin is intact bilaterally in the upper extremities without any bilateral elbow lymphadenopathy.  Patient exhibits diminished bilateral digital range of motion that is near symmetric; mildly more diminished at the left hand.  Notably, the patient lacks active left index finger MCP joint extension; passive range of motion is full and symmetric relative to the right side.  The thumb, long, ring and small finger extensor tendons are intact.  Flexor tendons are intact.  The dorsum of the left hand exhibits a healing subcentimeter wound without any redness, fluctuance, drainage or signs of active infection.  Sensation is intact to light touch throughout the entire left hand. [de-identified] : PA, oblique and lateral x-rays of the left wrist and hand were obtained today to assess for bony injury.  Diffuse arthritic changes, most notably at the STT, thumb CMC and IP joints.

## 2024-02-20 NOTE — REVIEW OF SYSTEMS
[Left] : left [Arthralgia] : arthralgia [Joint Pain] : joint pain [Joint Swelling] : joint swelling [Negative] : Allergic/Immunologic

## 2024-02-20 NOTE — HISTORY OF PRESENT ILLNESS
[Left] : left hand dominant [FreeTextEntry1] : 88yr old patient presents today, accompanied by her niece, in regard to previous left hand swelling and pain. Patient states that she was infiltrated with an IV at [facility] which she later got an abscess and got drained by the orthopedic team a few weeks ago. Patient complains of pain when she turns her left hand in certain angles but notes it is much better than it was in the hospital. Patient reports that she took IV antibiotics.  She is dependent, wheelchair-bound and lives at [facility] at this time.

## 2024-02-20 NOTE — ASSESSMENT
[FreeTextEntry1] : I had a lengthy discussion with the patient and her niece today regarding her resolving left hand infection, prior dorsal hand abscess.  I recommended the patient begin mobilizing her left hand and incorporating it into routine, daily activities.  I also explained that she has a ruptured left index finger extensor tendon, most likely both EDC and EIP.  I discussed the limited role of nonsurgical treatment for this and explained the role of surgical management, which would incorporate a repair versus reconstruction (tendon transfer).  The patient was not interested in surgery, noting that although she cannot extend her index finger it is not functionally limiting her nor causing any pain.  This will be monitored moving forward.  I directed the patient to follow back up with me if she notices any worsening swelling, redness, pain or drainage from the wound site.  The patient and her niece were well in accordance with the plan.

## 2024-02-27 ENCOUNTER — APPOINTMENT (OUTPATIENT)
Dept: GASTROENTEROLOGY | Facility: CLINIC | Age: 89
End: 2024-02-27

## 2024-02-29 ENCOUNTER — APPOINTMENT (OUTPATIENT)
Dept: OTOLARYNGOLOGY | Facility: CLINIC | Age: 89
End: 2024-02-29

## 2024-03-09 NOTE — CHART NOTE - NSCHARTNOTESELECT_GEN_ALL_CORE
EGD
Gastroenterology
Inaccurate lab result
Nutrition Services
Pathology Report Follow-Up
GOC/Event Note

## 2024-03-09 NOTE — CHART NOTE - NSCHARTNOTEFT_GEN_A_CORE
Patient's pathology report revealing Candida on esophageal brushings, which patient was already informed of and discharged on treatment with PO fluconazole. EGD biopsies otherwise WNL.

## 2024-04-25 ENCOUNTER — INPATIENT (INPATIENT)
Facility: HOSPITAL | Age: 89
LOS: 0 days | Discharge: HOME | DRG: 556 | End: 2024-04-26
Attending: INTERNAL MEDICINE | Admitting: STUDENT IN AN ORGANIZED HEALTH CARE EDUCATION/TRAINING PROGRAM
Payer: MEDICARE

## 2024-04-25 VITALS
RESPIRATION RATE: 18 BRPM | WEIGHT: 154.98 LBS | HEART RATE: 66 BPM | SYSTOLIC BLOOD PRESSURE: 137 MMHG | OXYGEN SATURATION: 98 % | DIASTOLIC BLOOD PRESSURE: 93 MMHG | TEMPERATURE: 98 F

## 2024-04-25 DIAGNOSIS — R26.2 DIFFICULTY IN WALKING, NOT ELSEWHERE CLASSIFIED: ICD-10-CM

## 2024-04-25 DIAGNOSIS — Z98.41 CATARACT EXTRACTION STATUS, RIGHT EYE: Chronic | ICD-10-CM

## 2024-04-25 LAB
ALBUMIN SERPL ELPH-MCNC: 4 G/DL — SIGNIFICANT CHANGE UP (ref 3.5–5.2)
ALP SERPL-CCNC: 138 U/L — HIGH (ref 30–115)
ALT FLD-CCNC: 18 U/L — SIGNIFICANT CHANGE UP (ref 0–41)
ANION GAP SERPL CALC-SCNC: 12 MMOL/L — SIGNIFICANT CHANGE UP (ref 7–14)
APPEARANCE UR: CLEAR — SIGNIFICANT CHANGE UP
AST SERPL-CCNC: 36 U/L — SIGNIFICANT CHANGE UP (ref 0–41)
BASOPHILS # BLD AUTO: 0.07 K/UL — SIGNIFICANT CHANGE UP (ref 0–0.2)
BASOPHILS NFR BLD AUTO: 0.8 % — SIGNIFICANT CHANGE UP (ref 0–1)
BILIRUB SERPL-MCNC: 0.4 MG/DL — SIGNIFICANT CHANGE UP (ref 0.2–1.2)
BILIRUB UR-MCNC: NEGATIVE — SIGNIFICANT CHANGE UP
BUN SERPL-MCNC: 14 MG/DL — SIGNIFICANT CHANGE UP (ref 10–20)
CALCIUM SERPL-MCNC: 8.8 MG/DL — SIGNIFICANT CHANGE UP (ref 8.4–10.4)
CHLORIDE SERPL-SCNC: 107 MMOL/L — SIGNIFICANT CHANGE UP (ref 98–110)
CO2 SERPL-SCNC: 20 MMOL/L — SIGNIFICANT CHANGE UP (ref 17–32)
COLOR SPEC: YELLOW — SIGNIFICANT CHANGE UP
CREAT SERPL-MCNC: 0.7 MG/DL — SIGNIFICANT CHANGE UP (ref 0.7–1.5)
DIFF PNL FLD: NEGATIVE — SIGNIFICANT CHANGE UP
EGFR: 83 ML/MIN/1.73M2 — SIGNIFICANT CHANGE UP
EOSINOPHIL # BLD AUTO: 0.16 K/UL — SIGNIFICANT CHANGE UP (ref 0–0.7)
EOSINOPHIL NFR BLD AUTO: 1.7 % — SIGNIFICANT CHANGE UP (ref 0–8)
GLUCOSE SERPL-MCNC: 99 MG/DL — SIGNIFICANT CHANGE UP (ref 70–99)
GLUCOSE UR QL: NEGATIVE MG/DL — SIGNIFICANT CHANGE UP
HCT VFR BLD CALC: 39.3 % — SIGNIFICANT CHANGE UP (ref 37–47)
HGB BLD-MCNC: 12.6 G/DL — SIGNIFICANT CHANGE UP (ref 12–16)
IMM GRANULOCYTES NFR BLD AUTO: 0.4 % — HIGH (ref 0.1–0.3)
KETONES UR-MCNC: NEGATIVE MG/DL — SIGNIFICANT CHANGE UP
LEUKOCYTE ESTERASE UR-ACNC: NEGATIVE — SIGNIFICANT CHANGE UP
LYMPHOCYTES # BLD AUTO: 1.55 K/UL — SIGNIFICANT CHANGE UP (ref 1.2–3.4)
LYMPHOCYTES # BLD AUTO: 16.8 % — LOW (ref 20.5–51.1)
MCHC RBC-ENTMCNC: 31.1 PG — HIGH (ref 27–31)
MCHC RBC-ENTMCNC: 32.1 G/DL — SIGNIFICANT CHANGE UP (ref 32–37)
MCV RBC AUTO: 97 FL — SIGNIFICANT CHANGE UP (ref 81–99)
MONOCYTES # BLD AUTO: 0.81 K/UL — HIGH (ref 0.1–0.6)
MONOCYTES NFR BLD AUTO: 8.8 % — SIGNIFICANT CHANGE UP (ref 1.7–9.3)
NEUTROPHILS # BLD AUTO: 6.62 K/UL — HIGH (ref 1.4–6.5)
NEUTROPHILS NFR BLD AUTO: 71.5 % — SIGNIFICANT CHANGE UP (ref 42.2–75.2)
NITRITE UR-MCNC: NEGATIVE — SIGNIFICANT CHANGE UP
NRBC # BLD: 0 /100 WBCS — SIGNIFICANT CHANGE UP (ref 0–0)
PH UR: 6.5 — SIGNIFICANT CHANGE UP (ref 5–8)
PLATELET # BLD AUTO: 215 K/UL — SIGNIFICANT CHANGE UP (ref 130–400)
PMV BLD: 11.1 FL — HIGH (ref 7.4–10.4)
POTASSIUM SERPL-MCNC: 5.4 MMOL/L — HIGH (ref 3.5–5)
POTASSIUM SERPL-SCNC: 5.4 MMOL/L — HIGH (ref 3.5–5)
PROT SERPL-MCNC: 7.1 G/DL — SIGNIFICANT CHANGE UP (ref 6–8)
PROT UR-MCNC: NEGATIVE MG/DL — SIGNIFICANT CHANGE UP
RBC # BLD: 4.05 M/UL — LOW (ref 4.2–5.4)
RBC # FLD: 14.2 % — SIGNIFICANT CHANGE UP (ref 11.5–14.5)
SODIUM SERPL-SCNC: 139 MMOL/L — SIGNIFICANT CHANGE UP (ref 135–146)
SP GR SPEC: 1.01 — SIGNIFICANT CHANGE UP (ref 1–1.03)
UROBILINOGEN FLD QL: 0.2 MG/DL — SIGNIFICANT CHANGE UP (ref 0.2–1)
WBC # BLD: 9.25 K/UL — SIGNIFICANT CHANGE UP (ref 4.8–10.8)
WBC # FLD AUTO: 9.25 K/UL — SIGNIFICANT CHANGE UP (ref 4.8–10.8)

## 2024-04-25 PROCEDURE — 70496 CT ANGIOGRAPHY HEAD: CPT | Mod: 26,MC

## 2024-04-25 PROCEDURE — 36415 COLL VENOUS BLD VENIPUNCTURE: CPT

## 2024-04-25 PROCEDURE — 70450 CT HEAD/BRAIN W/O DYE: CPT | Mod: 26,59,MC

## 2024-04-25 PROCEDURE — 83735 ASSAY OF MAGNESIUM: CPT

## 2024-04-25 PROCEDURE — 70498 CT ANGIOGRAPHY NECK: CPT | Mod: 26,MC

## 2024-04-25 PROCEDURE — 97161 PT EVAL LOW COMPLEX 20 MIN: CPT | Mod: GP

## 2024-04-25 PROCEDURE — 99285 EMERGENCY DEPT VISIT HI MDM: CPT

## 2024-04-25 PROCEDURE — 71045 X-RAY EXAM CHEST 1 VIEW: CPT

## 2024-04-25 PROCEDURE — 99283 EMERGENCY DEPT VISIT LOW MDM: CPT

## 2024-04-25 PROCEDURE — 80053 COMPREHEN METABOLIC PANEL: CPT

## 2024-04-25 PROCEDURE — 84443 ASSAY THYROID STIM HORMONE: CPT

## 2024-04-25 PROCEDURE — 85027 COMPLETE CBC AUTOMATED: CPT

## 2024-04-25 RX ORDER — MECLIZINE HCL 12.5 MG
25 TABLET ORAL ONCE
Refills: 0 | Status: COMPLETED | OUTPATIENT
Start: 2024-04-25 | End: 2024-04-25

## 2024-04-25 NOTE — CONSULT NOTE ADULT - SUBJECTIVE AND OBJECTIVE BOX
Neurology Consult Note    HPI: 89-year-old female with history of A-fib not on anticoagulation, glaucoma, hypothyroidism, hypertension brought in by EMS from [place] for 20 minute episode of transient leg weakness that did not allow her to ambulate. At baseline, pt axox3, pt can ambulate on her own short distances and at other times she will use a cane for longer distances. Pt also is able to remember long term but has trouble with short term memory, has never been diagnosed with dementia. This morning, pt got up from a laying position and felt her legs were weak and that she could not take another step. Pt attempted to walk for 20 minutes but felt as if she could not put one foot in front of another while in the standing position. 20 minutes later, pt was able to walk at her baseline. Family, bedside states that she had a similar episode in January when she sat on the floor for over 10 hours and stated she could move. CTH+CTA head and neck ordered per ED team, both negative. Neurology consulted. Denies dizziness lightheadedness, hx of vertigo, recent illness, n/v, falls, hx of stroke, seizure, decreased ability to carry out daily activities, night terrors, emotional outbreaks.      PAST MEDICAL & SURGICAL HISTORY:  Hypothyroidism      HTN (hypertension)      Glaucoma      History of cataract surgery, right          FAMILY HISTORY:      SOCIAL HISTORY:  Denies smoking, drinking, or drug use    ROS: as per HPI     MEDICATIONS  (STANDING):    MEDICATIONS  (PRN):      Allergies    Flagyl (Other)    Intolerances    Augmentin (Diarrhea)  NSAIDs (Other)  dairy products (Diarrhea)  Cipro (Diarrhea)      Vital Signs Last 24 Hrs  T(C): 36.8 (2024 14:25), Max: 36.8 (2024 14:25)  T(F): 98.2 (2024 14:25), Max: 98.2 (2024 14:25)  HR: 66 (2024 14:25) (66 - 66)  BP: 137/93 (2024 14:25) (137/93 - 137/93)  BP(mean): --  RR: 18 (2024 14:25) (18 - 18)  SpO2: 98% (2024 14:25) (98% - 98%)    Parameters below as of 2024 14:25  Patient On (Oxygen Delivery Method): room air        Physical exam:  General: No acute distress, awake and alert    Neurologic:  -Mental status: Awake, alert, oriented to person, place, and time. Speech is fluent with intact naming, repetition, and comprehension, no dysarthria. Recent and remote memory intact. Follows commands. Attention/concentration intact. Fund of knowledge appropriate.  -Cranial nerves:   II: Visual fields are full to confrontation.  III, IV, VI: Extraocular movements are intact without nystagmus. Pupils equally round and reactive to light  V:  Facial sensation V1-V3 equal and intact   VII: Face is symmetric with normal eye closure and smile  XI: Head turning and shoulder shrug are intact.  XII: Tongue protrudes midline  Motor: Normal bulk and slight rigid tone, when asked to relax pt stated she was not able to and continued to contract muscles. No pronator drift. Strength bilateral upper extremity 5/5, bilateral lower extremities 5/5.  Sensation: Intact to light touch bilaterally. No neglect or extinction on double simultaneous testing.  Coordination: No dysmetria on finger-to-nose and heel-to-shin bilaterally  Reflexes: Downgoing toes bilaterally   Gait: Hesitant gait and steady, will occasionally reach out hand for assistance without cane         LABS:                        12.6   9.25  )-----------( 215      ( 2024 15:25 )             39.3         139  |  107  |  14  ----------------------------<  99  5.4<H>   |  20  |  0.7    Ca    8.8      2024 15:25    TPro  7.1  /  Alb  4.0  /  TBili  0.4  /  DBili  x   /  AST  36  /  ALT  18  /  AlkPhos  138<H>        Urinalysis Basic - ( 2024 16:32 )    Color: Yellow / Appearance: Clear / S.008 / pH: x  Gluc: x / Ketone: Negative mg/dL  / Bili: Negative / Urobili: 0.2 mg/dL   Blood: x / Protein: Negative mg/dL / Nitrite: Negative   Leuk Esterase: Negative / RBC: x / WBC x   Sq Epi: x / Non Sq Epi: x / Bacteria: x        RADIOLOGY & ADDITIONAL TESTS:  < from: CT Head No Cont (24 @ 17:31) >  IMPRESSION:    CT HEAD:  No evidence of acute intracranial pathology.  No evidence of acute intracranial hemorrhage, acute territorial infarct,   mass or midline shift.      CTA:  Negative CTA of the head and neck  No evidence of major vascular stenosis,occlusion, or aneurysm.    < end of copied text >

## 2024-04-25 NOTE — ED PROVIDER NOTE - OBJECTIVE STATEMENT
88yo female PMHx pAF (no AC), Glaucoma (Latanoprost), Hypothyroidism, HTN presenting from [facility] with a complaint of dizziness "like I am on a boat" that began earlier this morning, associated with "my legs are feeling off" w/o numbness or weakness. Pt notes she uses a cane normally, but was unable to ambualte today d/t her symptoms. Unknown new onset, but last in her normal state of health yesterday. No recent f/c/cp/sob/bp/ap/n/v/d/us/r or any other complaints.

## 2024-04-25 NOTE — ED ADULT TRIAGE NOTE - CHIEF COMPLAINT QUOTE
PT presents to the ED c/o of weakness starting today. Per EMS they found pt to be HTN in first degree AV block.

## 2024-04-25 NOTE — ED PROVIDER NOTE - ATTENDING CONTRIBUTION TO CARE
89-year-old female with history of A-fib not on anticoagulation, mild dementia, glaucoma, hypothyroidism, hypertension brought in by EMS from [place] for dizziness.  Patient is unsure of her last known well, states that she woke up this morning however is unsure if she felt "normal" at that time however began feeling unsteady on her feet sometime this morning.  Patient denies changes to vision, numbness tingling or weakness of her extremities.  Usually uses a walker or cane for ambulation.  Denies chest pain fevers vomiting diarrhea or any other medical complaints.  On exam patient's with normal strength and sensation to upper and lower extremities.  Cranial nerves II to XII normal.  Finger-to-nose normal, heel-to-shin normal.  However when attempting to stand patient required two-person assist.  At this time NIHSS is 0. Not a candidate for TNK at this time. 89-year-old female with history of A-fib not on anticoagulation, mild dementia, glaucoma, hypothyroidism, hypertension brought in by EMS from [place] for dizziness.  Patient is unsure of her last known well, states that she woke up this morning (possibly around 9-10 am) however is unsure if she felt "normal" at that time however began feeling unsteady on her feet sometime this morning.  Patient denies changes to vision, numbness tingling or weakness of her extremities.  Usually uses a walker or cane for ambulation.  Denies chest pain fevers vomiting diarrhea or any other medical complaints.  On exam patient's with normal strength and sensation to upper and lower extremities.  Cranial nerves II to XII normal.  Finger-to-nose normal, heel-to-shin normal.  However when attempting to stand patient required two-person assist.  At this time NIHSS is 0. Not a candidate for TNK at this time.

## 2024-04-25 NOTE — ED PROVIDER NOTE - PHYSICAL EXAMINATION
VITAL SIGNS: I have reviewed nursing notes and confirm.  CONSTITUTIONAL: Well-appearing, non-toxic, in NAD  SKIN: Warm dry, normal skin turgor  HEAD: NCAT  EYES: No conjunctival injection, scleral anicteric  ENT: MMM  NECK: Supple; FROM.   CARD: RRR  RESP: CTAB  ABD: Soft, NDNT  EXT: No pedal edema, no calf tenderness, no limb tenderness.   NEURO: +vertical nystagmus. No dysmetria/normal FNF, A&Ox3, CN2-12 intact and equal bilateral. PERRLA, EOMI. Clear and fluent speech. No pronator drift. Strength 5/5 throughout, normal sensation. Pt unsteady when attempting to ambulate, has to hold on to something else.   PSYCH: Cooperative, appropriate

## 2024-04-25 NOTE — ED ADULT NURSE NOTE - NSFALLRISKINTERV_ED_ALL_ED
Assistance OOB with selected safe patient handling equipment if applicable/Assistance with ambulation/Communicate fall risk and risk factors to all staff, patient, and family/Provide patient with walking aids/Provide visual cue: yellow wristband, yellow gown, etc/Reinforce activity limits and safety measures with patient and family/Call bell, personal items and telephone in reach/Instruct patient to call for assistance before getting out of bed/chair/stretcher/Non-slip footwear applied when patient is off stretcher/[place] to call system/Physically safe environment - no spills, clutter or unnecessary equipment/Purposeful Proactive Rounding/Room/bathroom lighting operational, light cord in reach

## 2024-04-25 NOTE — ED PROVIDER NOTE - CLINICAL SUMMARY MEDICAL DECISION MAKING FREE TEXT BOX
89-year-old female with history of A-fib not on anticoagulation, mild dementia, glaucoma, hypothyroidism, hypertension brought in by EMS from [place] for dizziness since this AM associated with difficulty ambulating. no chest pain, sob, abd pain, fever, n/v/d. labs unremarkable. ekg independently interpreted by me Dr. Frederick showing no stemi, NSR. CT head/angios with no acute pathology, neurology evaluated patient- however patient still with difficulty ambulating, will admit for further workup/ management.

## 2024-04-25 NOTE — CONSULT NOTE ADULT - ASSESSMENT
89-year-old female with history of A-fib not on anticoagulation, glaucoma, hypothyroidism, hypertension brought in by EMS from [place] for 20 minute episode of transient leg weakness that did not allow her to ambulate, however returned to baseline after. At baseline, pt axox3, pt can ambulate on her own short distances and at other times she will use a cane for longer distances. Pt had similar episode in January where she could not move off the floor for over 10 hours. CTH+CTA head and neck ordered per ED team, both negative. Exam significant for only slightly rigid tone, however pt courtney muscles, hesitant gait and steady, will occasionally reach out hand for assistance without cane.     Impression: Pt with negative imaging and no deficits on exam, unclear of etiology of presentation, possibly behavioral.     RECS:  -No further neurological work up   -Can consider PT evaluation if admitted to medicine   -If needed, may follow up with neurology outpatient.    Discussed with Dr. Thornton

## 2024-04-26 ENCOUNTER — TRANSCRIPTION ENCOUNTER (OUTPATIENT)
Age: 89
End: 2024-04-26

## 2024-04-26 VITALS
OXYGEN SATURATION: 97 % | HEART RATE: 69 BPM | RESPIRATION RATE: 18 BRPM | SYSTOLIC BLOOD PRESSURE: 158 MMHG | TEMPERATURE: 97 F | DIASTOLIC BLOOD PRESSURE: 72 MMHG

## 2024-04-26 LAB
ALBUMIN SERPL ELPH-MCNC: 4 G/DL — SIGNIFICANT CHANGE UP (ref 3.5–5.2)
ALP SERPL-CCNC: 137 U/L — HIGH (ref 30–115)
ALT FLD-CCNC: 21 U/L — SIGNIFICANT CHANGE UP (ref 0–41)
ANION GAP SERPL CALC-SCNC: 12 MMOL/L — SIGNIFICANT CHANGE UP (ref 7–14)
AST SERPL-CCNC: 23 U/L — SIGNIFICANT CHANGE UP (ref 0–41)
BILIRUB SERPL-MCNC: 0.5 MG/DL — SIGNIFICANT CHANGE UP (ref 0.2–1.2)
BUN SERPL-MCNC: 12 MG/DL — SIGNIFICANT CHANGE UP (ref 10–20)
CALCIUM SERPL-MCNC: 9.1 MG/DL — SIGNIFICANT CHANGE UP (ref 8.4–10.5)
CHLORIDE SERPL-SCNC: 109 MMOL/L — SIGNIFICANT CHANGE UP (ref 98–110)
CO2 SERPL-SCNC: 21 MMOL/L — SIGNIFICANT CHANGE UP (ref 17–32)
CREAT SERPL-MCNC: 0.6 MG/DL — LOW (ref 0.7–1.5)
CULTURE RESULTS: SIGNIFICANT CHANGE UP
EGFR: 86 ML/MIN/1.73M2 — SIGNIFICANT CHANGE UP
GLUCOSE SERPL-MCNC: 90 MG/DL — SIGNIFICANT CHANGE UP (ref 70–99)
HCT VFR BLD CALC: 39.2 % — SIGNIFICANT CHANGE UP (ref 37–47)
HGB BLD-MCNC: 12.2 G/DL — SIGNIFICANT CHANGE UP (ref 12–16)
MAGNESIUM SERPL-MCNC: 2.1 MG/DL — SIGNIFICANT CHANGE UP (ref 1.8–2.4)
MCHC RBC-ENTMCNC: 30.5 PG — SIGNIFICANT CHANGE UP (ref 27–31)
MCHC RBC-ENTMCNC: 31.1 G/DL — LOW (ref 32–37)
MCV RBC AUTO: 98 FL — SIGNIFICANT CHANGE UP (ref 81–99)
NRBC # BLD: 0 /100 WBCS — SIGNIFICANT CHANGE UP (ref 0–0)
PLATELET # BLD AUTO: 206 K/UL — SIGNIFICANT CHANGE UP (ref 130–400)
PMV BLD: 11.5 FL — HIGH (ref 7.4–10.4)
POTASSIUM SERPL-MCNC: 3.6 MMOL/L — SIGNIFICANT CHANGE UP (ref 3.5–5)
POTASSIUM SERPL-SCNC: 3.6 MMOL/L — SIGNIFICANT CHANGE UP (ref 3.5–5)
PROT SERPL-MCNC: 6.1 G/DL — SIGNIFICANT CHANGE UP (ref 6–8)
RBC # BLD: 4 M/UL — LOW (ref 4.2–5.4)
RBC # FLD: 14.2 % — SIGNIFICANT CHANGE UP (ref 11.5–14.5)
SODIUM SERPL-SCNC: 142 MMOL/L — SIGNIFICANT CHANGE UP (ref 135–146)
SPECIMEN SOURCE: SIGNIFICANT CHANGE UP
TSH SERPL-MCNC: 5.63 UIU/ML — HIGH (ref 0.27–4.2)
WBC # BLD: 9.23 K/UL — SIGNIFICANT CHANGE UP (ref 4.8–10.8)
WBC # FLD AUTO: 9.23 K/UL — SIGNIFICANT CHANGE UP (ref 4.8–10.8)

## 2024-04-26 PROCEDURE — 71045 X-RAY EXAM CHEST 1 VIEW: CPT | Mod: 26

## 2024-04-26 PROCEDURE — 99234 HOSP IP/OBS SM DT SF/LOW 45: CPT

## 2024-04-26 RX ORDER — MECLIZINE HCL 12.5 MG
1 TABLET ORAL
Qty: 45 | Refills: 0
Start: 2024-04-26 | End: 2024-05-10

## 2024-04-26 RX ORDER — AMLODIPINE BESYLATE 2.5 MG/1
5 TABLET ORAL ONCE
Refills: 0 | Status: COMPLETED | OUTPATIENT
Start: 2024-04-26 | End: 2024-04-26

## 2024-04-26 RX ORDER — MECLIZINE HCL 12.5 MG
12.5 TABLET ORAL THREE TIMES A DAY
Refills: 0 | Status: DISCONTINUED | OUTPATIENT
Start: 2024-04-26 | End: 2024-04-26

## 2024-04-26 RX ORDER — CARVEDILOL PHOSPHATE 80 MG/1
12.5 CAPSULE, EXTENDED RELEASE ORAL EVERY 12 HOURS
Refills: 0 | Status: DISCONTINUED | OUTPATIENT
Start: 2024-04-26 | End: 2024-04-26

## 2024-04-26 RX ORDER — LEVOTHYROXINE SODIUM 125 MCG
88 TABLET ORAL DAILY
Refills: 0 | Status: DISCONTINUED | OUTPATIENT
Start: 2024-04-26 | End: 2024-04-26

## 2024-04-26 RX ORDER — ENOXAPARIN SODIUM 100 MG/ML
40 INJECTION SUBCUTANEOUS EVERY 24 HOURS
Refills: 0 | Status: DISCONTINUED | OUTPATIENT
Start: 2024-04-26 | End: 2024-04-26

## 2024-04-26 RX ORDER — PANTOPRAZOLE SODIUM 20 MG/1
40 TABLET, DELAYED RELEASE ORAL
Refills: 0 | Status: DISCONTINUED | OUTPATIENT
Start: 2024-04-26 | End: 2024-04-26

## 2024-04-26 RX ORDER — LATANOPROST 0.05 MG/ML
1 SOLUTION/ DROPS OPHTHALMIC; TOPICAL AT BEDTIME
Refills: 0 | Status: DISCONTINUED | OUTPATIENT
Start: 2024-04-26 | End: 2024-04-26

## 2024-04-26 RX ADMIN — Medication 88 MICROGRAM(S): at 05:37

## 2024-04-26 RX ADMIN — AMLODIPINE BESYLATE 5 MILLIGRAM(S): 2.5 TABLET ORAL at 14:25

## 2024-04-26 RX ADMIN — PANTOPRAZOLE SODIUM 40 MILLIGRAM(S): 20 TABLET, DELAYED RELEASE ORAL at 05:40

## 2024-04-26 RX ADMIN — CARVEDILOL PHOSPHATE 12.5 MILLIGRAM(S): 80 CAPSULE, EXTENDED RELEASE ORAL at 05:37

## 2024-04-26 RX ADMIN — ENOXAPARIN SODIUM 40 MILLIGRAM(S): 100 INJECTION SUBCUTANEOUS at 05:37

## 2024-04-26 NOTE — H&P ADULT - HISTORY OF PRESENT ILLNESS
Ms. Overton is an 88yo F with a PMH of HTN, hypothyroidism, glaucoma, and paroxysmal Afib not on AC presenting for difficulty ambulating w/ cane x1 day. States she felt "off balance" while walking but denies weakness, dizziness, changes in vision, and numbness. She typically ambulates with a cane. She did not experience any fall or LOC. She denies chest pain, shortness of breath, nausea, vomiting, or diarrhea. She is admitted to medicine for further management.    Medications confirmed with ROSARIO Kidd from [facility] [phone]    Vitals  Temp: 98.2  BP: 137/93  HR: 66  O2: 98%  RR: 18    Labs  WBC: 9.25  K: 5.4 (Hemolyzed)  Alk Phos: 138  UA: Negative    Imaging  CT Head, CTA Head and Neck: Negative for acute pathology

## 2024-04-26 NOTE — DISCHARGE NOTE PROVIDER - NSDCCPCAREPLAN_GEN_ALL_CORE_FT
PRINCIPAL DISCHARGE DIAGNOSIS  Diagnosis: Impaired ambulation  Assessment and Plan of Treatment: Infection was ruled out   PT eval no change in original plan  meclizine prn for dizziness     PRINCIPAL DISCHARGE DIAGNOSIS  Diagnosis: Impaired ambulation  Assessment and Plan of Treatment: Infection was ruled out   imaging of the head were normal  Blood work was within normal limits  PT eval no change in original plan  meclizine prn for dizziness  if you any major conern seek immediate medical attention or call 911.

## 2024-04-26 NOTE — DISCHARGE NOTE PROVIDER - ATTENDING DISCHARGE PHYSICAL EXAMINATION:
VITALS:   T(C): 35.8 (04-26-24 @ 04:56), Max: 36.8 (04-25-24 @ 14:25)  HR: 68 (04-26-24 @ 04:56) (66 - 68)  BP: 169/81 (04-26-24 @ 04:56) (134/60 - 169/81)  RR: 18 (04-26-24 @ 04:56) (18 - 18)  SpO2: 95% (04-26-24 @ 04:56) (95% - 98%)    GENERAL: NAD, lying in bed comfortably  HEART: Regular rate and rhythm,  LUNGS: Unlabored respirations.  Clear to auscultation bilaterally,  ABDOMEN: Soft, nontender, nondistended, +BS  EXTREMITIES: 2+ peripheral pulses bilaterally.  Neuro: no deficit noted

## 2024-04-26 NOTE — PHYSICAL THERAPY INITIAL EVALUATION ADULT - PLANNED THERAPY INTERVENTIONS, PT EVAL
There are currently no skilled inpatient PT goals for the patient. Patient is being discharged from the inpatient skilled PT services. Please reconsult if the status changes.

## 2024-04-26 NOTE — H&P ADULT - ATTENDING COMMENTS
Ms. Overton is an 88yo F with a PMH of HTN, hypothyroidism, glaucoma, and paroxysmal Afib not on AC presenting for difficulty ambulating w/ cane x1 day.     1. Difficulty Ambulating  * Describes "unsteadiness" on her feet earlier today  * Denies weakness, lightheadedness, changes in vision, fall, or LOC  - Admit to medicine                                            - CT Head, CTA Head+Neck negative for acute pathology  - Possibly deconditioning given age, not very mobile at [facility]  - Fall precautions  - Start Meclizine 12.5mg TID PRN  - PT eval:pending     2. Paroxysmal Atrial Fibrillation/HTN  - Not on AC for pAF  - C/w home Carvedilol 12.5mg BID    3. Hypothyroidism  - C/w home synthroid 88mcg QD    4. Glaucoma  - C/w home Latanprost eye drops QHS    5. ?GERD  - C/w home Protonix 40mg QD    #Misc  #DVT ppx: Lovenox  #GI ppx: Protonix  #Diet: DASH/TLC  #Activity: Ambulate w/ Assistance  #Dipso: GMF Ms. Overton is an 88yo F with a PMH of HTN, hypothyroidism, glaucoma, and paroxysmal Afib not on AC presenting for difficulty ambulating w/ cane x1 day.     1. Difficulty Ambulating  * Describes "unsteadiness" on her feet earlier today  * Denies weakness, lightheadedness, changes in vision, fall, or LOC  - Admit to medicine                                            - CT Head, CTA Head+Neck negative for acute pathology  - Possibly deconditioning given age, not very mobile at [facility]  - Fall precautions  - Start Meclizine 12.5mg TID PRN  - PT eval appreciated     2. Paroxysmal Atrial Fibrillation/HTN  - Not on AC for pAF  - C/w home Carvedilol 12.5mg BID    3. Hypothyroidism  - C/w home synthroid 88mcg QD    4. Glaucoma  - C/w home Latanprost eye drops QHS    5. ?GERD  - C/w home Protonix 40mg QD    #Misc  #DVT ppx: Lovenox  #GI ppx: Protonix  #Diet: DASH/TLC  #Activity: Ambulate w/ Assistance  #Dipso: GMF

## 2024-04-26 NOTE — H&P ADULT - NSHPREVIEWOFSYSTEMS_GEN_ALL_CORE
Constitutional: No weakness, fevers, or chills  Eyes/ENT: No visual changes. No vertigo or throat pain   Neck: No pain or stiffness  Respiratory: No cough, wheezing, or hemoptysis. No shortness of breath  Cardiovascular: No chest pain or palpitations  Gastrointestinal: No abdominal or epigastric pain. No nausea, vomiting, or hematemesis. No diarrhea or constipation. No melena or hematochezia.  Genitourinary: No dysuria, frequency, or hematuria  Musculoskeletal: FROM all extremities, normal strength, no calf tenderness  Neurological: No numbness or weakness  Skin: No itching or rashes

## 2024-04-26 NOTE — PATIENT PROFILE ADULT - FALL HARM RISK - HARM RISK INTERVENTIONS
Assistance with ambulation/Assistance OOB with selected safe patient handling equipment/Communicate Risk of Fall with Harm to all staff/Reinforce activity limits and safety measures with patient and family/Tailored Fall Risk Interventions/Use of alarms - bed, chair and/or voice tab/Visual Cue: Yellow wristband and red socks/Bed in lowest position, wheels locked, appropriate side rails in place/Call bell, personal items and telephone in reach/Instruct patient to call for assistance before getting out of bed or chair/Non-slip footwear when patient is out of bed/[place] to call system/Physically safe environment - no spills, clutter or unnecessary equipment/Purposeful Proactive Rounding/Room/bathroom lighting operational, light cord in reach

## 2024-04-26 NOTE — DISCHARGE NOTE NURSING/CASE MANAGEMENT/SOCIAL WORK - PATIENT PORTAL LINK FT
You can access the FollowMyHealth Patient Portal offered by [facility] by registering at the following website: [email or web address]. By joining [facility] [employer]’s FollowMyHealth portal, you will also be able to view your health information using other applications (apps) compatible with our system.

## 2024-04-26 NOTE — DISCHARGE NOTE PROVIDER - NSDCMRMEDTOKEN_GEN_ALL_CORE_FT
carvedilol 12.5 mg oral tablet: 1 tab(s) orally 2 times a day  LATANOPROST 0.005% EYE DROPS: 1 applicator to each affected eye once a day (at bedtime) INSTILL 1 DROP INTO AFFECTED EYE(S) ONCE DAILY IN THE EVENING  LEVOTHYROXINE 88 MCG TABLET: 1 tab(s) orally once a day TAKE 1 TABLET BY MOUTH EVERY DAY  meclizine 12.5 mg oral tablet: 1 tab(s) orally 3 times a day as needed for Dizziness  pantoprazole 40 mg oral delayed release tablet: 1 tab(s) orally every 12 hours

## 2024-04-26 NOTE — H&P ADULT - ASSESSMENT
Ms. Overton is an 89yo F with a PMH of HTN, hypothyroidism, glaucoma, and paroxysmal Afib not on AC presenting for difficulty ambulating w/ cane x1 day.     #Difficulty Ambulating  - Describes "unsteadiness" on her feet earlier today  - Denies weakness, lightheadedness, changes in vision, fall, or LOC  - CT Head, CTA Head+Neck negative for acute pathology  - Possibly deconditioning given age, not very mobile at [facility]  - Fall precautions  - Start Meclizine 12.5mg TID PRN  - F/u PT    #Paroxysmal Atrial Fibrillation  #HTN  - Not on AC for pAF  - C/w home Carvedilol 12.5mg BID    #Hypothyroidism  - C/w home synthroid 88mcg QD    #Glaucoma  - C/w home Latanprost eye drops QHS    #?GERD  - C/w home Protonix 40mg QD    #Misc  #DVT ppx: Lovenox  #GI ppx: Protonix  #Diet: DASH/TLC  #Activity: Ambulate w/ Assistance  #Dipso: GMF

## 2024-04-26 NOTE — PATIENT PROFILE ADULT - LEGAL HELP
[facility] Emergency Department    [address]    [facility] [address]    Phone:  [phone]    Fax:  [phone]                                       Michael Campbell   MRN: [id number]    Department:  [facility] Emergency Department   Date of Visit:  12/1/2018           After Visit Summary Signature Page     I have received my discharge instructions, and my questions have been answered. I have discussed any challenges I see with this plan with the nurse or doctor.    ..........................................................................................................................................  Patient/Patient Representative Signature      ..........................................................................................................................................  Patient Representative Print Name and Relationship to Patient    ..................................................               ................................................  Date                                   Time    ..........................................................................................................................................  Reviewed by Signature/Title    ...................................................              ..............................................  Date                                               Time          22EPIC Rev 08/18         no

## 2024-04-26 NOTE — PHYSICAL THERAPY INITIAL EVALUATION ADULT - GENERAL OBSERVATIONS, REHAB EVAL
Patient was seen from 10:35-11:05 for PT IE. Patient was rec'd in semi reclined in bed, +IVL, NAD, agreeable to participate in PT.

## 2024-04-26 NOTE — PHYSICAL THERAPY INITIAL EVALUATION ADULT - PERTINENT HX OF CURRENT PROBLEM, REHAB EVAL
Ms. Overton is an 89yo F with a PMH of HTN, hypothyroidism, glaucoma, and paroxysmal Afib not on AC presenting for difficulty ambulating w/ cane x1 day. States she felt "off balance" while walking but denies weakness, dizziness, changes in vision, and numbness. She typically ambulates with a cane. She did not experience any fall or LOC. She denies chest pain, shortness of breath, nausea, vomiting, or diarrhea. She is admitted to medicine for further management.

## 2024-04-26 NOTE — DISCHARGE NOTE PROVIDER - CARE PROVIDER_API CALL
Anatoliy Bartlett  Internal Medicine  [address] [place] [id number]  Phone: [phone]  Fax: [phone]  Established Patient  Follow Up Time: 1 week

## 2024-04-26 NOTE — DISCHARGE NOTE PROVIDER - HOSPITAL COURSE
Ms. Overton is an 89yo F with a PMH of HTN, hypothyroidism, glaucoma, and paroxysmal Afib not on AC presenting for difficulty ambulating w/ cane x1 day.     1. Difficulty Ambulating  * Describes "unsteadiness" on her feet earlier today  * Denies weakness, lightheadedness, changes in vision, fall, or LOC  - Admit to medicine                                            - CT Head, CTA Head+Neck negative for acute pathology  - Possibly deconditioning given age, not very mobile at [facility]  - Fall precautions  - Started on  Meclizine 12.5mg TID PRN  - PT eval appreciated     2. Paroxysmal Atrial Fibrillation/HTN  - Not on AC for pAF  - C/w home Carvedilol 12.5mg BID    3. Hypothyroidism  - C/w home synthroid 88mcg QD    4. Glaucoma  - C/w home Latanprost eye drops QHS    5. ?GERD  - C/w home Protonix 40mg QD     Ms. Overton is an 88yo F with a PMH of HTN, hypothyroidism, glaucoma, and paroxysmal Afib not on AC presenting for difficulty ambulating w/ cane x1 day.     1. Difficulty Ambulating  * Describes "unsteadiness" on her feet earlier today  * Denies weakness, lightheadedness, changes in vision, fall, or LOC  - Admit to medicine                                            - CT Head, CTA Head+Neck negative for acute pathology  - Possibly deconditioning given age, not very mobile at [facility]  - Fall precautions  - Started on  Meclizine 12.5mg TID PRN  - PT eval appreciated     2. Paroxysmal Atrial Fibrillation/HTN  - Not on AC for pAF  - C/w home Carvedilol 12.5mg BID    3. Hypothyroidism  - C/w home synthroid 88mcg QD    4. Glaucoma  - C/w home Latanprost eye drops QHS    5. ?GERD  - C/w home Protonix 40mg QD    patient is medically cleared for discharge

## 2024-05-07 DIAGNOSIS — K21.9 GASTRO-ESOPHAGEAL REFLUX DISEASE WITHOUT ESOPHAGITIS: ICD-10-CM

## 2024-05-07 DIAGNOSIS — R26.2 DIFFICULTY IN WALKING, NOT ELSEWHERE CLASSIFIED: ICD-10-CM

## 2024-05-07 DIAGNOSIS — E03.9 HYPOTHYROIDISM, UNSPECIFIED: ICD-10-CM

## 2024-05-07 DIAGNOSIS — I48.0 PAROXYSMAL ATRIAL FIBRILLATION: ICD-10-CM

## 2024-05-07 DIAGNOSIS — Z79.890 HORMONE REPLACEMENT THERAPY: ICD-10-CM

## 2024-05-07 DIAGNOSIS — R29.898 OTHER SYMPTOMS AND SIGNS INVOLVING THE MUSCULOSKELETAL SYSTEM: ICD-10-CM

## 2024-05-07 DIAGNOSIS — R42 DIZZINESS AND GIDDINESS: ICD-10-CM

## 2024-05-07 DIAGNOSIS — H40.9 UNSPECIFIED GLAUCOMA: ICD-10-CM

## 2024-05-07 DIAGNOSIS — Z88.1 ALLERGY STATUS TO OTHER ANTIBIOTIC AGENTS STATUS: ICD-10-CM

## 2024-05-07 DIAGNOSIS — I10 ESSENTIAL (PRIMARY) HYPERTENSION: ICD-10-CM

## 2024-05-07 NOTE — PROGRESS NOTE ADULT - PROBLEM SELECTOR PLAN 10
Fluid: tolerating PO  Electrolytes: replete PRN, cautions with hypernatremia correction as above.   Nutrition: soft bite sized  DVT ppx: hold for now iso c/f gi bleed  GI ppx: none for now   Code: DNR [facility] ([facility])  Dispo: [facility] Discharged

## 2024-08-29 ENCOUNTER — INPATIENT (INPATIENT)
Facility: HOSPITAL | Age: 89
LOS: 5 days | Discharge: NURSING, REHAB OR LONG-TERM CARE | DRG: 481 | End: 2024-09-04
Attending: INTERNAL MEDICINE | Admitting: STUDENT IN AN ORGANIZED HEALTH CARE EDUCATION/TRAINING PROGRAM
Payer: MEDICARE

## 2024-08-29 VITALS
WEIGHT: 134.92 LBS | HEART RATE: 101 BPM | DIASTOLIC BLOOD PRESSURE: 83 MMHG | TEMPERATURE: 98 F | SYSTOLIC BLOOD PRESSURE: 180 MMHG | RESPIRATION RATE: 18 BRPM | HEIGHT: 66 IN | OXYGEN SATURATION: 94 %

## 2024-08-29 DIAGNOSIS — S72.009A FRACTURE OF UNSPECIFIED PART OF NECK OF UNSPECIFIED FEMUR, INITIAL ENCOUNTER FOR CLOSED FRACTURE: ICD-10-CM

## 2024-08-29 DIAGNOSIS — Z98.41 CATARACT EXTRACTION STATUS, RIGHT EYE: Chronic | ICD-10-CM

## 2024-08-29 LAB
ABO RH CONFIRMATION: SIGNIFICANT CHANGE UP
ABO RH CONFIRMATION: SIGNIFICANT CHANGE UP
ALBUMIN SERPL ELPH-MCNC: 3.5 G/DL — SIGNIFICANT CHANGE UP (ref 3.5–5.2)
ALBUMIN SERPL ELPH-MCNC: 4.1 G/DL — SIGNIFICANT CHANGE UP (ref 3.5–5.2)
ALBUMIN SERPL ELPH-MCNC: 4.1 G/DL — SIGNIFICANT CHANGE UP (ref 3.5–5.2)
ALP SERPL-CCNC: 126 U/L — HIGH (ref 30–115)
ALP SERPL-CCNC: 141 U/L — HIGH (ref 30–115)
ALT FLD-CCNC: 22 U/L — SIGNIFICANT CHANGE UP (ref 0–41)
ALT FLD-CCNC: 30 U/L — SIGNIFICANT CHANGE UP (ref 0–41)
ALT FLD-CCNC: 38 U/L — SIGNIFICANT CHANGE UP (ref 0–41)
ANION GAP SERPL CALC-SCNC: 12 MMOL/L — SIGNIFICANT CHANGE UP (ref 7–14)
ANION GAP SERPL CALC-SCNC: 12 MMOL/L — SIGNIFICANT CHANGE UP (ref 7–14)
ANION GAP SERPL CALC-SCNC: 15 MMOL/L — HIGH (ref 7–14)
APPEARANCE UR: CLEAR — SIGNIFICANT CHANGE UP
APTT BLD: 29.9 SEC — SIGNIFICANT CHANGE UP (ref 27–39.2)
AST SERPL-CCNC: 23 U/L — SIGNIFICANT CHANGE UP (ref 0–41)
AST SERPL-CCNC: 29 U/L — SIGNIFICANT CHANGE UP (ref 0–41)
AST SERPL-CCNC: 54 U/L — HIGH (ref 0–41)
BASOPHILS # BLD AUTO: 0.09 K/UL — SIGNIFICANT CHANGE UP (ref 0–0.2)
BASOPHILS # BLD AUTO: 0.11 K/UL — SIGNIFICANT CHANGE UP (ref 0–0.2)
BASOPHILS NFR BLD AUTO: 0.6 % — SIGNIFICANT CHANGE UP (ref 0–1)
BASOPHILS NFR BLD AUTO: 0.7 % — SIGNIFICANT CHANGE UP (ref 0–1)
BILIRUB SERPL-MCNC: 0.4 MG/DL — SIGNIFICANT CHANGE UP (ref 0.2–1.2)
BILIRUB SERPL-MCNC: 0.7 MG/DL — SIGNIFICANT CHANGE UP (ref 0.2–1.2)
BILIRUB SERPL-MCNC: 0.9 MG/DL — SIGNIFICANT CHANGE UP (ref 0.2–1.2)
BILIRUB UR-MCNC: NEGATIVE — SIGNIFICANT CHANGE UP
BLD GP AB SCN SERPL QL: SIGNIFICANT CHANGE UP
BUN SERPL-MCNC: 18 MG/DL — SIGNIFICANT CHANGE UP (ref 10–20)
BUN SERPL-MCNC: 19 MG/DL — SIGNIFICANT CHANGE UP (ref 10–20)
BUN SERPL-MCNC: 27 MG/DL — HIGH (ref 10–20)
CALCIUM SERPL-MCNC: 8.1 MG/DL — LOW (ref 8.4–10.5)
CALCIUM SERPL-MCNC: 8.5 MG/DL — SIGNIFICANT CHANGE UP (ref 8.4–10.5)
CALCIUM SERPL-MCNC: 8.6 MG/DL — SIGNIFICANT CHANGE UP (ref 8.4–10.5)
CHLORIDE SERPL-SCNC: 102 MMOL/L — SIGNIFICANT CHANGE UP (ref 98–110)
CHLORIDE SERPL-SCNC: 104 MMOL/L — SIGNIFICANT CHANGE UP (ref 98–110)
CHLORIDE SERPL-SCNC: 104 MMOL/L — SIGNIFICANT CHANGE UP (ref 98–110)
CO2 SERPL-SCNC: 19 MMOL/L — SIGNIFICANT CHANGE UP (ref 17–32)
CO2 SERPL-SCNC: 21 MMOL/L — SIGNIFICANT CHANGE UP (ref 17–32)
CO2 SERPL-SCNC: 22 MMOL/L — SIGNIFICANT CHANGE UP (ref 17–32)
COLOR SPEC: YELLOW — SIGNIFICANT CHANGE UP
CREAT SERPL-MCNC: 0.7 MG/DL — SIGNIFICANT CHANGE UP (ref 0.7–1.5)
CREAT SERPL-MCNC: 0.9 MG/DL — SIGNIFICANT CHANGE UP (ref 0.7–1.5)
CREAT SERPL-MCNC: 1.1 MG/DL — SIGNIFICANT CHANGE UP (ref 0.7–1.5)
DIFF PNL FLD: NEGATIVE — SIGNIFICANT CHANGE UP
EGFR: 48 ML/MIN/1.73M2 — LOW
EGFR: 61 ML/MIN/1.73M2 — SIGNIFICANT CHANGE UP
EGFR: 83 ML/MIN/1.73M2 — SIGNIFICANT CHANGE UP
EOSINOPHIL # BLD AUTO: 0.1 K/UL — SIGNIFICANT CHANGE UP (ref 0–0.7)
EOSINOPHIL # BLD AUTO: 0.27 K/UL — SIGNIFICANT CHANGE UP (ref 0–0.7)
EOSINOPHIL NFR BLD AUTO: 0.6 % — SIGNIFICANT CHANGE UP (ref 0–8)
EOSINOPHIL NFR BLD AUTO: 1.6 % — SIGNIFICANT CHANGE UP (ref 0–8)
GLUCOSE SERPL-MCNC: 114 MG/DL — HIGH (ref 70–99)
GLUCOSE SERPL-MCNC: 116 MG/DL — HIGH (ref 70–99)
GLUCOSE SERPL-MCNC: 128 MG/DL — HIGH (ref 70–99)
GLUCOSE UR QL: NEGATIVE MG/DL — SIGNIFICANT CHANGE UP
HCT VFR BLD CALC: 31.3 % — LOW (ref 37–47)
HCT VFR BLD CALC: 37 % — SIGNIFICANT CHANGE UP (ref 37–47)
HCT VFR BLD CALC: 39.6 % — SIGNIFICANT CHANGE UP (ref 37–47)
HGB BLD-MCNC: 10 G/DL — LOW (ref 12–16)
HGB BLD-MCNC: 11.9 G/DL — LOW (ref 12–16)
HGB BLD-MCNC: 12.9 G/DL — SIGNIFICANT CHANGE UP (ref 12–16)
IMM GRANULOCYTES NFR BLD AUTO: 0.6 % — HIGH (ref 0.1–0.3)
IMM GRANULOCYTES NFR BLD AUTO: 0.6 % — HIGH (ref 0.1–0.3)
INR BLD: 1.15 RATIO — SIGNIFICANT CHANGE UP (ref 0.65–1.3)
KETONES UR-MCNC: NEGATIVE MG/DL — SIGNIFICANT CHANGE UP
LACTATE SERPL-SCNC: 1.6 MMOL/L — SIGNIFICANT CHANGE UP (ref 0.7–2)
LEUKOCYTE ESTERASE UR-ACNC: ABNORMAL
LIDOCAIN IGE QN: 68 U/L — HIGH (ref 7–60)
LYMPHOCYTES # BLD AUTO: 1.75 K/UL — SIGNIFICANT CHANGE UP (ref 1.2–3.4)
LYMPHOCYTES # BLD AUTO: 10.8 % — LOW (ref 20.5–51.1)
LYMPHOCYTES # BLD AUTO: 13.9 % — LOW (ref 20.5–51.1)
LYMPHOCYTES # BLD AUTO: 2.29 K/UL — SIGNIFICANT CHANGE UP (ref 1.2–3.4)
MAGNESIUM SERPL-MCNC: 1.9 MG/DL — SIGNIFICANT CHANGE UP (ref 1.8–2.4)
MAGNESIUM SERPL-MCNC: 2 MG/DL — SIGNIFICANT CHANGE UP (ref 1.8–2.4)
MCHC RBC-ENTMCNC: 29.9 PG — SIGNIFICANT CHANGE UP (ref 27–31)
MCHC RBC-ENTMCNC: 30.1 PG — SIGNIFICANT CHANGE UP (ref 27–31)
MCHC RBC-ENTMCNC: 30.5 PG — SIGNIFICANT CHANGE UP (ref 27–31)
MCHC RBC-ENTMCNC: 31.9 G/DL — LOW (ref 32–37)
MCHC RBC-ENTMCNC: 32.2 G/DL — SIGNIFICANT CHANGE UP (ref 32–37)
MCHC RBC-ENTMCNC: 32.6 G/DL — SIGNIFICANT CHANGE UP (ref 32–37)
MCV RBC AUTO: 93.4 FL — SIGNIFICANT CHANGE UP (ref 81–99)
MCV RBC AUTO: 93.6 FL — SIGNIFICANT CHANGE UP (ref 81–99)
MCV RBC AUTO: 93.7 FL — SIGNIFICANT CHANGE UP (ref 81–99)
MONOCYTES # BLD AUTO: 1.28 K/UL — HIGH (ref 0.1–0.6)
MONOCYTES # BLD AUTO: 2.18 K/UL — HIGH (ref 0.1–0.6)
MONOCYTES NFR BLD AUTO: 13.4 % — HIGH (ref 1.7–9.3)
MONOCYTES NFR BLD AUTO: 7.8 % — SIGNIFICANT CHANGE UP (ref 1.7–9.3)
NEUTROPHILS # BLD AUTO: 12.01 K/UL — HIGH (ref 1.4–6.5)
NEUTROPHILS # BLD AUTO: 12.46 K/UL — HIGH (ref 1.4–6.5)
NEUTROPHILS NFR BLD AUTO: 74 % — SIGNIFICANT CHANGE UP (ref 42.2–75.2)
NEUTROPHILS NFR BLD AUTO: 75.4 % — HIGH (ref 42.2–75.2)
NITRITE UR-MCNC: NEGATIVE — SIGNIFICANT CHANGE UP
NRBC # BLD: 0 /100 WBCS — SIGNIFICANT CHANGE UP (ref 0–0)
PH UR: 7 — SIGNIFICANT CHANGE UP (ref 5–8)
PLATELET # BLD AUTO: 170 K/UL — SIGNIFICANT CHANGE UP (ref 130–400)
PLATELET # BLD AUTO: 202 K/UL — SIGNIFICANT CHANGE UP (ref 130–400)
PLATELET # BLD AUTO: 205 K/UL — SIGNIFICANT CHANGE UP (ref 130–400)
PMV BLD: 11.6 FL — HIGH (ref 7.4–10.4)
PMV BLD: 11.8 FL — HIGH (ref 7.4–10.4)
PMV BLD: 11.9 FL — HIGH (ref 7.4–10.4)
POTASSIUM SERPL-MCNC: 3.2 MMOL/L — LOW (ref 3.5–5)
POTASSIUM SERPL-MCNC: 3.4 MMOL/L — LOW (ref 3.5–5)
POTASSIUM SERPL-MCNC: 5.2 MMOL/L — HIGH (ref 3.5–5)
POTASSIUM SERPL-MCNC: 5.4 MMOL/L — HIGH (ref 3.5–5)
POTASSIUM SERPL-SCNC: 3.2 MMOL/L — LOW (ref 3.5–5)
POTASSIUM SERPL-SCNC: 3.4 MMOL/L — LOW (ref 3.5–5)
POTASSIUM SERPL-SCNC: 5.2 MMOL/L — HIGH (ref 3.5–5)
POTASSIUM SERPL-SCNC: 5.4 MMOL/L — HIGH (ref 3.5–5)
PROT SERPL-MCNC: 5.5 G/DL — LOW (ref 6–8)
PROT SERPL-MCNC: 6.3 G/DL — SIGNIFICANT CHANGE UP (ref 6–8)
PROT SERPL-MCNC: 7.1 G/DL — SIGNIFICANT CHANGE UP (ref 6–8)
PROT UR-MCNC: NEGATIVE MG/DL — SIGNIFICANT CHANGE UP
PROTHROM AB SERPL-ACNC: 13.1 SEC — HIGH (ref 9.95–12.87)
RBC # BLD: 3.34 M/UL — LOW (ref 4.2–5.4)
RBC # BLD: 3.96 M/UL — LOW (ref 4.2–5.4)
RBC # BLD: 4.23 M/UL — SIGNIFICANT CHANGE UP (ref 4.2–5.4)
RBC # FLD: 15.3 % — HIGH (ref 11.5–14.5)
RBC # FLD: 15.3 % — HIGH (ref 11.5–14.5)
RBC # FLD: 15.5 % — HIGH (ref 11.5–14.5)
SODIUM SERPL-SCNC: 136 MMOL/L — SIGNIFICANT CHANGE UP (ref 135–146)
SODIUM SERPL-SCNC: 137 MMOL/L — SIGNIFICANT CHANGE UP (ref 135–146)
SODIUM SERPL-SCNC: 138 MMOL/L — SIGNIFICANT CHANGE UP (ref 135–146)
SP GR SPEC: 1.02 — SIGNIFICANT CHANGE UP (ref 1–1.03)
UROBILINOGEN FLD QL: 0.2 MG/DL — SIGNIFICANT CHANGE UP (ref 0.2–1)
WBC # BLD: 13.28 K/UL — HIGH (ref 4.8–10.8)
WBC # BLD: 16.23 K/UL — HIGH (ref 4.8–10.8)
WBC # BLD: 16.51 K/UL — HIGH (ref 4.8–10.8)
WBC # FLD AUTO: 13.28 K/UL — HIGH (ref 4.8–10.8)
WBC # FLD AUTO: 16.23 K/UL — HIGH (ref 4.8–10.8)
WBC # FLD AUTO: 16.51 K/UL — HIGH (ref 4.8–10.8)

## 2024-08-29 PROCEDURE — 82248 BILIRUBIN DIRECT: CPT

## 2024-08-29 PROCEDURE — 93010 ELECTROCARDIOGRAM REPORT: CPT | Mod: 77

## 2024-08-29 PROCEDURE — 85025 COMPLETE CBC W/AUTO DIFF WBC: CPT

## 2024-08-29 PROCEDURE — 85027 COMPLETE CBC AUTOMATED: CPT

## 2024-08-29 PROCEDURE — 99285 EMERGENCY DEPT VISIT HI MDM: CPT | Mod: GC

## 2024-08-29 PROCEDURE — 97110 THERAPEUTIC EXERCISES: CPT | Mod: GP

## 2024-08-29 PROCEDURE — 97162 PT EVAL MOD COMPLEX 30 MIN: CPT | Mod: GP

## 2024-08-29 PROCEDURE — 82247 BILIRUBIN TOTAL: CPT

## 2024-08-29 PROCEDURE — C1713: CPT

## 2024-08-29 PROCEDURE — 93010 ELECTROCARDIOGRAM REPORT: CPT

## 2024-08-29 PROCEDURE — 71260 CT THORAX DX C+: CPT | Mod: 26,MC

## 2024-08-29 PROCEDURE — 86850 RBC ANTIBODY SCREEN: CPT

## 2024-08-29 PROCEDURE — 80053 COMPREHEN METABOLIC PANEL: CPT

## 2024-08-29 PROCEDURE — 71045 X-RAY EXAM CHEST 1 VIEW: CPT | Mod: 26

## 2024-08-29 PROCEDURE — 73562 X-RAY EXAM OF KNEE 3: CPT | Mod: 26,RT

## 2024-08-29 PROCEDURE — 70450 CT HEAD/BRAIN W/O DYE: CPT | Mod: 26,MC

## 2024-08-29 PROCEDURE — C9399: CPT

## 2024-08-29 PROCEDURE — 84100 ASSAY OF PHOSPHORUS: CPT

## 2024-08-29 PROCEDURE — 36415 COLL VENOUS BLD VENIPUNCTURE: CPT

## 2024-08-29 PROCEDURE — 73552 X-RAY EXAM OF FEMUR 2/>: CPT | Mod: 26,RT

## 2024-08-29 PROCEDURE — 86901 BLOOD TYPING SEROLOGIC RH(D): CPT

## 2024-08-29 PROCEDURE — 97166 OT EVAL MOD COMPLEX 45 MIN: CPT | Mod: GO

## 2024-08-29 PROCEDURE — 72170 X-RAY EXAM OF PELVIS: CPT | Mod: 26

## 2024-08-29 PROCEDURE — 97530 THERAPEUTIC ACTIVITIES: CPT | Mod: GP

## 2024-08-29 PROCEDURE — 84132 ASSAY OF SERUM POTASSIUM: CPT

## 2024-08-29 PROCEDURE — 82962 GLUCOSE BLOOD TEST: CPT

## 2024-08-29 PROCEDURE — 86900 BLOOD TYPING SEROLOGIC ABO: CPT

## 2024-08-29 PROCEDURE — 27245 TREAT THIGH FRACTURE: CPT | Mod: RT

## 2024-08-29 PROCEDURE — 73501 X-RAY EXAM HIP UNI 1 VIEW: CPT | Mod: RT

## 2024-08-29 PROCEDURE — 83735 ASSAY OF MAGNESIUM: CPT

## 2024-08-29 PROCEDURE — 80048 BASIC METABOLIC PNL TOTAL CA: CPT

## 2024-08-29 PROCEDURE — 99223 1ST HOSP IP/OBS HIGH 75: CPT

## 2024-08-29 PROCEDURE — 72125 CT NECK SPINE W/O DYE: CPT | Mod: 26,MC

## 2024-08-29 PROCEDURE — 93005 ELECTROCARDIOGRAM TRACING: CPT

## 2024-08-29 PROCEDURE — 99222 1ST HOSP IP/OBS MODERATE 55: CPT | Mod: 57

## 2024-08-29 PROCEDURE — 74177 CT ABD & PELVIS W/CONTRAST: CPT | Mod: 26,MC

## 2024-08-29 PROCEDURE — 97535 SELF CARE MNGMENT TRAINING: CPT | Mod: GO

## 2024-08-29 RX ORDER — SODIUM CHLORIDE 9 MG/ML
1000 INJECTION INTRAMUSCULAR; INTRAVENOUS; SUBCUTANEOUS
Refills: 0 | Status: DISCONTINUED | OUTPATIENT
Start: 2024-08-29 | End: 2024-08-30

## 2024-08-29 RX ORDER — CARVEDILOL 6.25 MG/1
12.5 TABLET ORAL EVERY 12 HOURS
Refills: 0 | Status: DISCONTINUED | OUTPATIENT
Start: 2024-08-29 | End: 2024-08-29

## 2024-08-29 RX ORDER — DEXAMETHASONE 0.75 MG
8 TABLET ORAL ONCE
Refills: 0 | Status: DISCONTINUED | OUTPATIENT
Start: 2024-08-29 | End: 2024-08-30

## 2024-08-29 RX ORDER — ONDANSETRON 2 MG/ML
4 INJECTION, SOLUTION INTRAMUSCULAR; INTRAVENOUS ONCE
Refills: 0 | Status: DISCONTINUED | OUTPATIENT
Start: 2024-08-29 | End: 2024-08-29

## 2024-08-29 RX ORDER — METOPROLOL TARTRATE 100 MG/1
25 TABLET ORAL ONCE
Refills: 0 | Status: COMPLETED | OUTPATIENT
Start: 2024-08-29 | End: 2024-08-29

## 2024-08-29 RX ORDER — ONDANSETRON 2 MG/ML
4 INJECTION, SOLUTION INTRAMUSCULAR; INTRAVENOUS EVERY 8 HOURS
Refills: 0 | Status: DISCONTINUED | OUTPATIENT
Start: 2024-08-29 | End: 2024-08-29

## 2024-08-29 RX ORDER — SODIUM CHLORIDE 9 MG/ML
1000 INJECTION INTRAMUSCULAR; INTRAVENOUS; SUBCUTANEOUS
Refills: 0 | Status: DISCONTINUED | OUTPATIENT
Start: 2024-08-29 | End: 2024-08-29

## 2024-08-29 RX ORDER — OMEPRAZOLE 40 MG/1
1 CAPSULE, DELAYED RELEASE ORAL
Refills: 0 | DISCHARGE

## 2024-08-29 RX ORDER — POTASSIUM CHLORIDE 10 MEQ
20 TABLET, EXT RELEASE, PARTICLES/CRYSTALS ORAL
Refills: 0 | Status: COMPLETED | OUTPATIENT
Start: 2024-08-29 | End: 2024-08-30

## 2024-08-29 RX ORDER — ACETAMINOPHEN 325 MG/1
650 TABLET ORAL EVERY 6 HOURS
Refills: 0 | Status: DISCONTINUED | OUTPATIENT
Start: 2024-08-29 | End: 2024-08-29

## 2024-08-29 RX ORDER — CEFAZOLIN SODIUM 2 G/100ML
2000 INJECTION, SOLUTION INTRAVENOUS EVERY 8 HOURS
Refills: 0 | Status: COMPLETED | OUTPATIENT
Start: 2024-08-29 | End: 2024-08-30

## 2024-08-29 RX ORDER — OXYCODONE HYDROCHLORIDE 5 MG/1
5 TABLET ORAL ONCE
Refills: 0 | Status: DISCONTINUED | OUTPATIENT
Start: 2024-08-29 | End: 2024-08-30

## 2024-08-29 RX ORDER — ACETAMINOPHEN 325 MG/1
650 TABLET ORAL ONCE
Refills: 0 | Status: COMPLETED | OUTPATIENT
Start: 2024-08-29 | End: 2024-08-29

## 2024-08-29 RX ORDER — METOCLOPRAMIDE HCL 5 MG
10 TABLET ORAL ONCE
Refills: 0 | Status: DISCONTINUED | OUTPATIENT
Start: 2024-08-29 | End: 2024-08-29

## 2024-08-29 RX ORDER — ACETAMINOPHEN 325 MG/1
1000 TABLET ORAL ONCE
Refills: 0 | Status: DISCONTINUED | OUTPATIENT
Start: 2024-08-29 | End: 2024-08-30

## 2024-08-29 RX ORDER — MECLIZINE HYDROCHLORIDE 25 MG/1
12.5 TABLET ORAL THREE TIMES A DAY
Refills: 0 | Status: DISCONTINUED | OUTPATIENT
Start: 2024-08-29 | End: 2024-08-29

## 2024-08-29 RX ORDER — MAGNESIUM, ALUMINUM HYDROXIDE 200-225/5
30 SUSPENSION, ORAL (FINAL DOSE FORM) ORAL EVERY 4 HOURS
Refills: 0 | Status: DISCONTINUED | OUTPATIENT
Start: 2024-08-29 | End: 2024-08-29

## 2024-08-29 RX ORDER — HYDROMORPHONE HYDROCHLORIDE 2 MG/1
0.25 TABLET ORAL
Refills: 0 | Status: DISCONTINUED | OUTPATIENT
Start: 2024-08-29 | End: 2024-08-30

## 2024-08-29 RX ORDER — PANTOPRAZOLE SODIUM 40 MG
40 TABLET, DELAYED RELEASE (ENTERIC COATED) ORAL
Refills: 0 | Status: DISCONTINUED | OUTPATIENT
Start: 2024-08-29 | End: 2024-08-29

## 2024-08-29 RX ORDER — LEVOTHYROXINE SODIUM 100 MCG
88 TABLET ORAL DAILY
Refills: 0 | Status: DISCONTINUED | OUTPATIENT
Start: 2024-08-29 | End: 2024-08-29

## 2024-08-29 RX ORDER — LATANOPROST 50 UG/ML
1 SOLUTION OPHTHALMIC AT BEDTIME
Refills: 0 | Status: DISCONTINUED | OUTPATIENT
Start: 2024-08-29 | End: 2024-08-29

## 2024-08-29 RX ORDER — AMLODIPINE BESYLATE 10 MG/1
5 TABLET ORAL DAILY
Refills: 0 | Status: DISCONTINUED | OUTPATIENT
Start: 2024-08-29 | End: 2024-08-29

## 2024-08-29 RX ADMIN — Medication 2 MILLIGRAM(S): at 10:40

## 2024-08-29 RX ADMIN — ACETAMINOPHEN 650 MILLIGRAM(S): 325 TABLET ORAL at 04:10

## 2024-08-29 RX ADMIN — Medication 50 MILLIEQUIVALENT(S): at 23:58

## 2024-08-29 RX ADMIN — ACETAMINOPHEN 650 MILLIGRAM(S): 325 TABLET ORAL at 05:52

## 2024-08-29 RX ADMIN — METOPROLOL TARTRATE 25 MILLIGRAM(S): 100 TABLET ORAL at 23:58

## 2024-08-29 RX ADMIN — Medication 2 MILLIGRAM(S): at 10:25

## 2024-08-29 RX ADMIN — Medication 25 GRAM(S): at 23:58

## 2024-08-29 RX ADMIN — SODIUM CHLORIDE 50 MILLILITER(S): 9 INJECTION INTRAMUSCULAR; INTRAVENOUS; SUBCUTANEOUS at 09:24

## 2024-08-29 NOTE — BRIEF OPERATIVE NOTE - COMMENTS
[place] 10 x 170 mm x 125 degree Gamma 4 nail with a 95 mm lag screw, 32.5 mm distal interlocking screw, and 32.5 mm blocking screw

## 2024-08-29 NOTE — ED PROVIDER NOTE - CLINICAL SUMMARY MEDICAL DECISION MAKING FREE TEXT BOX
Patient is evaluated status post mechanical fall, diagnosed with hip fracture in ED.  Patient evaluated by orthopedics, admitted to medicine for continued care and further evaluation

## 2024-08-29 NOTE — ED ADULT NURSE REASSESSMENT NOTE - NS ED NURSE REASSESS COMMENT FT1
report given to ED3 RN PJ. VSS upon transfer. personal belongings at bedside. pt made aware of transfer.

## 2024-08-29 NOTE — PRE-ANESTHESIA EVALUATION ADULT - NSRADCARDRESULTSFT_GEN_ALL_CORE
EKG:    Ventricular Rate 101 BPM    Atrial Rate 88 BPM    P-R Interval 230 ms    QRS Duration 100 ms    Q-T Interval 362 ms    QTC Calculation(Bazett) 469 ms    P Axis 62 degrees    R Axis 8 degrees    T Axis 55 degrees    Diagnosis Line Sinus rhythm with 1st degree A-V block with frequent Premature ventricular  complexes and  Fusion complexes  Possible Left atrial enlargement  Minimal voltage criteria for LVH, may be normal variant ( Kenji product )  Abnormal ECG    Confirmed by Eddie Phillips ([id number]) on 8/29/2024 7:19:56 AM    CXR:    ACC: [id number] EXAM:  XR CHEST 1 VIEW   ORDERED BY: YOAN KAUFFMAN     PROCEDURE DATE:  08/29/2024          INTERPRETATION:  Clinical History / Reason for exam: Trauma    Comparison : Chest radiograph April 26, 2024.    Technique/Positioning: Low lung volume.    Findings:    Support devices: None.    Cardiac/mediastinum/hilum: Stable    Lung parenchyma/Pleura: Bilateral interstitial opacities. No pneumothorax   is seen.    Skeleton/soft tissues: Stable    Impression:    Low lung volume.    Bilateral interstitial opacities.    --- End of Report ---            GARFIELD HERNANDEZ MD; Attending Radiologist  This document has been electronically signed. Aug 29 2024  6:51AM    ECHO:    ACC: [id number] EXAM:  ECHO TTE WITH CON COMP W DOPP                          PROCEDURE DATE:  01/23/2024          INTERPRETATION:  -----------------------------------  TRANSTHORACIC ECHOCARDIOGRAM REPORT      ---------------------------------------------------------------------------  -----  Patient Name:   ROSE HINES Date of Exam:        1/23/2024  Medical Rec #:  [id number]      Height/Weight:       65.0 in / 127.87 lb  Account #:      [id number]      BSA:                 1.63 m²  YOB: 1935    BP:                  /  Patient Age:    88 years     HR:                  74 bpm  Patient Gender: F            Sonographer:         Robin Lim                               Referring Physician: ELISSA PEREIRA      ---------------------------------------------------------------------------  -----  CPT:                ECHO TTE WITH CON COMP W [facility] - ; - 8990007.m  Indication(s):      R94.31 - Abnormal electrocardiogram ECG/EKG  Procedure:          A complete two-dimensional transthoracic   echocardiogram was                      performed: 2D, M-mode, spectral and color flow   Doppler.  Ordering Location:  [facility]    Study Quality:      Study quality was good.  Contrast Injection: Contrast injection with an imaging solution Definity   was                      performed to enhance endocardial border definition.  Study Comments:     SOB      ---------------------------------------------------------------------------  -----  CONCLUSIONS:     1. Normal left and right ventricular size and systolic function.   2. Mild symmetric left ventricular hypertrophy.   3. Grade I left ventricular diastolic dysfunction.   4. Mild mitral regurgitation.   5. Aortic sclerosis without significant stenosis.   6. Pulmonary artery systolic pressure is 32 mmHg.   7. Trivial pericardial effusion.

## 2024-08-29 NOTE — ED PROVIDER NOTE - OBJECTIVE STATEMENT
89-year-old female with past medical history of hypertension, hypothyroidism, A-fib, not on blood thinners, coming from an [place], presents to the ED s/p mechanical fall with right-sided hip pain.  Patient states that she is slipped and fell on her right side on tile floor.  Unable to ambulate after event.  Questionable trauma to head, no LOC, increased right hip pain.  No numbness or tingling to right leg.  Right leg mildly externally rotated.  Patient alert and oriented x 3 no acute distress.

## 2024-08-29 NOTE — CONSULT NOTE ADULT - ATTENDING COMMENTS
I saw and evaluated the patient on 8/29/2024. I reviewed the pertinent results and imaging. I discussed with the resident/PA and I agree with the findings and plan of care as documented by the resident/PA with the following comments:    The patient is an 89 year old female with a past medical history of hypertension, hypothyroidism, Afib not on blood thinners, who sustained a closed injury to her right hip after a ground level fall at an assisted living facility. Evaluation in the emergency department demonstrated a right displaced pertrochanteric femur fracture. At baseline, she ambulates with a cane.    On my exam in the preoperative holding area, the patient was resting comfortably in a hospital bed. Vital signs stable. In no acute distress with normal work of breathing. Examination of the right lower extremity demonstrated intact skin with a short and externally rotated leg. +TA/GS/EHL/FHL motor intact. SILT s/t/s/dp/sp. Foot warm and well perfused with good capillary refill    Labs:  Hgb 11.9  WBC 13  Plt 205  K 5.2 -> 3.2  Remainder of BMP wnl  Glucose 114  Coags wnl    XRs and CT were reviewed, demonstrating a right displaced pertrochanteric femur fracture    #Right closed displaced pertrochanteric femur fracture    For this injury, the patient was indicated for surgical stabilization with Right hip closed versus open reduction and internal fixation.  The risks, benefits, and alternatives of the proposed surgical procedure were discussed in detail with the patient.  The risks included but were not limited to bleeding, infection, injury to nearby nerves/vessels/structures, malunion, nonunion, implant/fixation failure, functional impairment, wound healing complications, anesthesia related complications, need for reoperation, blood clots, stroke, cardiac arrest, loss of limb, and even death. I discussed the postoperative recovery and rehabilitation. I discussed the morbidity and mortality associated with geriatric hip fractures. The patient understood and elected to proceed with the proposed surgical procedure.    Preoperative medical evaluation and risk stratification were completed.    Plan to proceed with Right hip closed versus open reduction and internal fixation

## 2024-08-29 NOTE — H&P ADULT - NSHPPHYSICALEXAM_GEN_ALL_CORE
PHYSICAL EXAM:  GENERAL: NAD, well-groomed, well-developed  HEAD:  Atraumatic, Normocephalic  EYES: EOMI, PERRLA, conjunctiva and sclera clear  ENMT: No tonsillar erythema, exudates, or enlargement; Moist mucous membranes  NECK: Supple, No JVD, Normal thyroid  HEART: Regular rate and rhythm; No murmurs, rubs, or gallops  RESPIRATORY: CTA B/L, No W/R/R  ABDOMEN: Soft, Nontender, Nondistended; Bowel sounds present  NEUROLOGY: A&Ox3, nonfocal, moving all extremities  EXTREMITIES:  R leg externally rotated  SKIN: warm, dry, normal color, no rash or abnormal lesions

## 2024-08-29 NOTE — CHART NOTE - NSCHARTNOTEFT_GEN_A_CORE
Per ACC Guidelines, pt would be moderate risk for moderate risk procedure. Further cardiac workup is not indicated.

## 2024-08-29 NOTE — ED ADULT TRIAGE NOTE - CHIEF COMPLAINT QUOTE
BIBEMS from [facility] s/p fall about 30 mins ago. R hip pain and swelling.  denies Head strike and LOC  Pt is DNR/DNI and [place]

## 2024-08-29 NOTE — H&P ADULT - ATTENDING COMMENTS
89-year-old female with past medical history of hypertension, hypothyroidism, A-fib, not on blood thinners, coming from an [place], presents to the ED after a mechanical fall with right-sided hip pain.  Patient states that she is slipped on her tile floor and fell onto her right side. After falling she was unable to get up.    #Acute, comminuted displaced right femoral intertrochanteric fracture  - Ortho following going for ORIF today  - Hold AC for now, restart after the procedure  - Pain control with Morphine 2mg q6 hours prn  - PT/OT post procedure    #KASEY, suspect pre-renal  - Cont IVF  - Monitor BMP    #Hypothyroidism  - Cont synthroid 88mcg qd    #HTN  - Cont amlodipine 5mg qD  - Cont coreg 12.5mg BID    DVT PPX, held for OR    #Progress Note Handoff  Pending (specify): ORIF, PT  Family discussion: dw pt and niece regarding plan of care  Disposition: [facility], from home

## 2024-08-29 NOTE — PATIENT PROFILE ADULT - FALL HARM RISK - HARM RISK INTERVENTIONS
Assistance with ambulation/Assistance OOB with selected safe patient handling equipment/Communicate Risk of Fall with Harm to all staff/Reinforce activity limits and safety measures with patient and family/Tailored Fall Risk Interventions/Visual Cue: Yellow wristband and red socks/Bed in lowest position, wheels locked, appropriate side rails in place/Call bell, personal items and telephone in reach/Instruct patient to call for assistance before getting out of bed or chair/Non-slip footwear when patient is out of bed/[place] to call system/Physically safe environment - no spills, clutter or unnecessary equipment/Purposeful Proactive Rounding/Room/bathroom lighting operational, light cord in reach Assistance with ambulation/Assistance OOB with selected safe patient handling equipment/Communicate Risk of Fall with Harm to all staff/Discuss with provider need for PT consult/Monitor gait and stability/Provide patient with walking aids - walker, cane, crutches/Reinforce activity limits and safety measures with patient and family/Tailored Fall Risk Interventions/Visual Cue: Yellow wristband and red socks/Bed in lowest position, wheels locked, appropriate side rails in place/Call bell, personal items and telephone in reach/Instruct patient to call for assistance before getting out of bed or chair/Non-slip footwear when patient is out of bed/[place] to call system/Physically safe environment - no spills, clutter or unnecessary equipment/Purposeful Proactive Rounding/Room/bathroom lighting operational, light cord in reach

## 2024-08-29 NOTE — ED ADULT NURSE NOTE - PRIMARY CARE PROVIDER
Patient seen today for evaluation of polyarthralgia.    Patient reports joint pain:  Current: 8/10  Last 3 months: 6/10  Location: Knees, ankles, hands R>L      Medication reconcilliation performed. Patient reports compliance with prescribed medications.     OTC/Alt therapies:prn tylenol and ibuprofen around the clock, OTC muscle rubs.    Other concerns: Recently had DEXA scan completed, tendonitis in ankles. Does currently have a tooth that will be getting pulled on Monday (experiencing increased pain from that). Does clean houses.    Health Maintenance Due   Topic Date Due    Depression Screening  Never done    Colorectal Cancer Screen-  Never done    Hepatitis C Screening  Never done    Osteoporosis Screening  Never done    Medicare Advantage- Medicare Wellness Visit  01/01/2024       Patient is due for topics as listed above but is not proceeding with Colorectal Cancer Screening: Colonoscopy, Depression Screening , Hepatitis C Screening, MWV (Medicare Wellness Visit), and Osteoporosis screening at this time. Will follow with PCP for topics above.     pmd

## 2024-08-29 NOTE — PRE PROCEDURE NOTE - PRE PROCEDURE EVALUATION
Orthopaedic Surgery Preop Note      Planned procedure: RIGHT HIP OPEN REDUCTION INTERNAL FIXATION      Consent: R/B/A discussed w/ pt/pt's family who expressed understanding and wished to proceed w/ surgery.     Consults/clearances:   Medicine  Anesthesia      Vitals:  T(C): 36.6 (08-29-24 @ 00:20), Max: 36.6 (08-29-24 @ 00:20)  HR: 84 (08-29-24 @ 00:45) (84 - 101)  BP: 145/101 (08-29-24 @ 00:45) (145/101 - 180/83)  RR: 16 (08-29-24 @ 00:45) (16 - 18)  SpO2: 95% (08-29-24 @ 00:45) (94% - 95%)        NPO @ MN, IVF  CXR: see PACS  EKG: see chart  Type and screen x2: done   2 units prbc on hold  Blood bank called:    Labs                        12.9   16.51 )-----------( 202      ( 29 Aug 2024 01:00 )             39.6     08-29    138  |  104  |  27<H>  ----------------------------<  116<H>  5.4<H>   |  19  |  1.1    Ca    8.6      29 Aug 2024 01:00    TPro  7.1  /  Alb  4.1  /  TBili  0.4  /  DBili  x   /  AST  54<H>  /  ALT  38  /  AlkPhos  141<H>  08-29    LIVER FUNCTIONS - ( 29 Aug 2024 01:00 )  Alb: 4.1 g/dL / Pro: 7.1 g/dL / ALK PHOS: 141 U/L / ALT: 38 U/L / AST: 54 U/L / GGT: x           PT/INR - ( 29 Aug 2024 01:00 )   PT: 13.10 sec;   INR: 1.15 ratio         PTT - ( 29 Aug 2024 01:00 )  PTT:29.9 sec    Anticoagulation status (include name of anticaogulant):  Please hold Lovenox after midnight    A/P    [ ] NPO and IVF @ midnight  [ ] pain control/analgesia prn per primary team  [ ] Incentive Spirometery  [ ] Notify Ortho with any questions - spectra [id number]    [ ] DISCUSSED WITH PRIMARY TEAM MEMBER (name of team member): Tom  [ ] Date and TIME DISCUSSED WITH PRIMARY TEAM MEMBER: 4:00 AM 8/29

## 2024-08-29 NOTE — ED ADULT NURSE NOTE - NSFALLHARMRISKINTERV_ED_ALL_ED

## 2024-08-29 NOTE — PATIENT PROFILE ADULT - FUNCTIONAL ASSESSMENT - BASIC MOBILITY 6.
2-calculated by average/Not able to assess (calculate score using [facility] averaging method)  2-calculated by average/Not able to assess (calculate score using [facility] averaging method)

## 2024-08-29 NOTE — ED ADULT NURSE NOTE - CHIEF COMPLAINT QUOTE
BIBEMS from [facility] s/p fall about 30 mins ago. R hip pain and swelling.  denies Head strike and LOC  Pt is DNR/DNI and [place]

## 2024-08-29 NOTE — H&P ADULT - ASSESSMENT
89-year-old female with past medical history of hypertension, hypothyroidism, A-fib, not on blood thinners, coming from an [place], presents to the ED after a mechanical fall with right-sided hip pain, admitted for  acute, comminuted displaced right femoral intertrochanteric fracture.    #Acute, comminuted displaced right femoral intertrochanteric fracture  - Patient with mechanical slip and fall   - CT demonstrating right sided fracture     Plan:   - Ortho following going for ORIF today  - Hold AC for now, restart after the procedure  - Pain control with Morphine 4mg q4 hours prn  - PT/OT post procedure     #KASEY   - Baseline Cr 0.7, currently 1.1   - Likely prerenal   - UA negative   - No hydro on CT Scan     Plan:   - LR @75cc/hr  - Repeat labs at 8am post procedure  - Patient received IV Contrast, could anticipate a bump in creatinine       #Misc  - DVT Prophylaxis: Lovenox post procedure   - GI Prophylaxis: None  - Diet: DASH Diet   - Activity: Bed rest prior to procedure   - IV Fluids: LR @75cc  - Code Status: Full Code    Dispo: Acute  89-year-old female with past medical history of hypertension, hypothyroidism, A-fib, not on blood thinners, coming from [place], presents to the ED after a mechanical fall with right-sided hip pain, admitted for  acute, comminuted displaced right femoral intertrochanteric fracture.    #Acute, comminuted displaced right femoral intertrochanteric fracture  - Patient with mechanical slip and fall   - CT demonstrating right sided fracture     Plan:   - Ortho following going for ORIF today  - Hold AC for now, restart after the procedure  - Pain control with Morphine 2mg q6 hours prn  - PT/OT post procedure     #KASEY   - Baseline Cr 0.7, currently 1.1   - Likely prerenal   - UA negative   - No hydro on CT Scan     Plan:   - LR @75cc/hr  - Repeat labs at 8am post procedure  - Patient received IV Contrast, could anticipate a bump in creatinine       #Misc  - DVT Prophylaxis: Lovenox post procedure   - GI Prophylaxis: None  - Diet: DASH Diet   - Activity: Bed rest prior to procedure   - IV Fluids: LR @75cc  - Code Status: Full Code    Dispo: Acute  89-year-old female with past medical history of hypertension, hypothyroidism, A-fib, not on blood thinners, coming from [place] [facility], presents to the ED after a mechanical fall with right-sided hip pain, admitted for  acute, comminuted displaced right femoral intertrochanteric fracture.    #Acute, comminuted displaced right femoral intertrochanteric fracture  - Patient with mechanical slip and fall   - CT demonstrating right sided fracture     Plan:   - Ortho following going for ORIF today  - Hold AC for now, restart after the procedure  - Pain control with Morphine 2mg q6 hours prn  - PT/OT post procedure     #KASEY   - Baseline Cr 0.7, currently 1.1   - Likely prerenal   - UA negative   - No hydro on CT Scan     Plan:   - LR @75cc/hr  - Repeat labs at 8am post procedure  - Patient received IV Contrast, could anticipate a bump in creatinine       #Misc  - DVT Prophylaxis: Lovenox post procedure   - GI Prophylaxis: None  - Diet: DASH Diet   - Activity: Bed rest prior to procedure   - IV Fluids: LR @75cc  - Code Status: DNR/DNI    Dispo: Acute

## 2024-08-29 NOTE — H&P ADULT - HISTORY OF PRESENT ILLNESS
89-year-old female with past medical history of hypertension, hypothyroidism, A-fib, not on blood thinners, coming from an [facility], presents to the ED after a mechanical fall with right-sided hip pain.  Patient states that she is slipped on her tile floor and fell onto her right side. After falling she was unable to get up. Remembers before and after the event.      In the ED BP was 180/83. , Afebrile. Labs significant for a wbc count of 16.51, Cr 1.1 (Baseline 0.7), BUN 27, , ALT 54, and Lipase 68. CT abdomen and pelvis showed acute, comminuted displaced right femoral intertrochanteric fracture.    Vital Signs Last 24 Hrs  T(C): 36.8 (29 Aug 2024 07:34), Max: 36.8 (29 Aug 2024 07:34)  T(F): 98.2 (29 Aug 2024 07:34), Max: 98.2 (29 Aug 2024 07:34)  HR: 52 (29 Aug 2024 07:34) (52 - 101)  BP: 161/61 (29 Aug 2024 07:34) (145/101 - 180/83)  BP(mean): 115 (29 Aug 2024 00:45) (115 - 115)  RR: 18 (29 Aug 2024 07:34) (16 - 18)  SpO2: 96% (29 Aug 2024 07:34) (94% - 96%)    Parameters below as of 29 Aug 2024 07:34  Patient On (Oxygen Delivery Method): room air

## 2024-08-29 NOTE — ED ADULT NURSE NOTE - OBJECTIVE STATEMENT
pt BIBEMS from [facility] s/p mechanical fall. (?) HT, (-) LOC, (-) AC. pt c/o R hip pain after fall. denies any chest pain, sob, n/v/d

## 2024-08-29 NOTE — ED PROVIDER NOTE - PHYSICAL EXAMINATION
VITAL SIGNS: I have reviewed nursing notes and confirm.  CONSTITUTIONAL: well-appearing, non-toxic, NAD  SKIN: Warm dry, normal skin turgor  HEAD: NCAT  NECK: Supple; non tender. Full ROM. No cervical LAD  CARD: RRR, no murmurs, rubs or gallops  RESP: clear to ausculation b/l.  No rales, rhonchi, or wheezing.  ABD: soft, non-tender, non-distended, no rebound or guarding.   EXT: R hip pain, R leg externally rotated, + R pedal pulse. no numbness to leg. L leg full ROM. No bony deformity.   NEURO:  normal sensory.   PSYCH: Cooperative, appropriate.

## 2024-08-29 NOTE — ED PROVIDER NOTE - ATTENDING CONTRIBUTION TO CARE
89-year-old female with PMH HTN, A-fib, hypothyroidism presents for evaluation status post fall prior to arrival.  Patient comes in from an assisted living facility where she had a mechanical fall earlier with right-sided hip pain.  Patient states she slipped and fell onto the tile floor and was unable to get up or ambulate after event.  Unclear if there was head trauma however patient denies any LOC.  Denies any numbness or tingling, nausea, vomiting, chest pain, shortness of breath, abdominal pain or back pain.    VITAL SIGNS: noted  CONSTITUTIONAL: Well-developed; well-nourished; in no acute distress  HEAD: Normocephalic; atraumatic  EYES: PERRL, EOM intact; conjunctiva and sclera clear  ENT: No nasal discharge; airway clear. MMM  NECK: Supple; non tender.   CARD: S1, S2 normal; no murmurs, gallops, or rubs. Regular rate and rhythm  RESP: CTAB/L, no wheezes, rales or rhonchi  ABD: Normal bowel sounds; soft; non-distended; non-tender; no hepatosplenomegaly. No CVA tenderness  EXT: Normal ROM. No calf tenderness or edema. Right lower extremity shortened and externally rotated, no ecchymosis or swelling to hip. Distal pulses intact  NEURO: Alert, oriented. Grossly unremarkable. No focal deficits  SKIN: Skin exam is warm and dry   MS: No midline spinal tenderness

## 2024-08-29 NOTE — ED ADULT NURSE NOTE - NS ED NURSE LEVEL OF CONSCIOUSNESS AFFECT
Pt c/o abdominal pain, vomiting, and diarrhea intermittently x3 weeks. Seen in ED 3 week ago and had negative workup but states she now has vomiting and the pain is increasing. Denies fevers, blood in stool/emesis. Calm/Appropriate full code  activity as tolerated  vte ppx w lovenox  regular diet

## 2024-08-29 NOTE — CHART NOTE - NSCHARTNOTEFT_GEN_A_CORE
PACU ANESTHESIA ADMISSION NOTE      Procedure: Antegrade intertrochanteric nailing of femur      Post op diagnosis:  Intertrochanteric fracture of right hip        ____  Intubated  TV:______       Rate: ______      FiO2: ______    _x___  Patent Airway    _x___  Full return of protective reflexes    ___  Full recovery from anesthesia / back to baseline status    Vitals:            T:  99              BP :    129/94            R:   18           Sat:   98            P:  83      Mental Status:  _x___ Awake   _____ Alert   _____ Drowsy   _____ Sedated    Nausea/Vomiting:  _x___  NO       ______Yes,   See Post - Op Orders         Pain Scale (0-10):  __0___    Treatment: ___ None    __x__ See Post - Op/PCA Orders    Post - Operative Fluids:   ___ Oral   __x__ See Post - Op Orders    Plan: Discharge:   ___Home       __x___Floor     _____Critical Care    _____  Other:_________________    Comments:  No anesthesia issues or complications noted.  Discharge when criteria met.

## 2024-08-30 LAB
ALBUMIN SERPL ELPH-MCNC: 3.4 G/DL — LOW (ref 3.5–5.2)
ALP SERPL-CCNC: 102 U/L — SIGNIFICANT CHANGE UP (ref 30–115)
ALP SERPL-CCNC: 107 U/L — SIGNIFICANT CHANGE UP (ref 30–115)
ALT FLD-CCNC: 18 U/L — SIGNIFICANT CHANGE UP (ref 0–41)
ANION GAP SERPL CALC-SCNC: 12 MMOL/L — SIGNIFICANT CHANGE UP (ref 7–14)
ANION GAP SERPL CALC-SCNC: 12 MMOL/L — SIGNIFICANT CHANGE UP (ref 7–14)
AST SERPL-CCNC: 24 U/L — SIGNIFICANT CHANGE UP (ref 0–41)
BASOPHILS # BLD AUTO: 0.1 K/UL — SIGNIFICANT CHANGE UP (ref 0–0.2)
BASOPHILS NFR BLD AUTO: 0.8 % — SIGNIFICANT CHANGE UP (ref 0–1)
BILIRUB SERPL-MCNC: 0.6 MG/DL — SIGNIFICANT CHANGE UP (ref 0.2–1.2)
BUN SERPL-MCNC: 20 MG/DL — SIGNIFICANT CHANGE UP (ref 10–20)
BUN SERPL-MCNC: 20 MG/DL — SIGNIFICANT CHANGE UP (ref 10–20)
CALCIUM SERPL-MCNC: 7.8 MG/DL — LOW (ref 8.4–10.4)
CALCIUM SERPL-MCNC: 8 MG/DL — LOW (ref 8.4–10.5)
CHLORIDE SERPL-SCNC: 102 MMOL/L — SIGNIFICANT CHANGE UP (ref 98–110)
CHLORIDE SERPL-SCNC: 105 MMOL/L — SIGNIFICANT CHANGE UP (ref 98–110)
CO2 SERPL-SCNC: 18 MMOL/L — SIGNIFICANT CHANGE UP (ref 17–32)
CO2 SERPL-SCNC: 20 MMOL/L — SIGNIFICANT CHANGE UP (ref 17–32)
CREAT SERPL-MCNC: 0.9 MG/DL — SIGNIFICANT CHANGE UP (ref 0.7–1.5)
CREAT SERPL-MCNC: 0.9 MG/DL — SIGNIFICANT CHANGE UP (ref 0.7–1.5)
EGFR: 61 ML/MIN/1.73M2 — SIGNIFICANT CHANGE UP
EGFR: 61 ML/MIN/1.73M2 — SIGNIFICANT CHANGE UP
EOSINOPHIL # BLD AUTO: 0.25 K/UL — SIGNIFICANT CHANGE UP (ref 0–0.7)
EOSINOPHIL NFR BLD AUTO: 2.1 % — SIGNIFICANT CHANGE UP (ref 0–8)
GLUCOSE SERPL-MCNC: 100 MG/DL — HIGH (ref 70–99)
GLUCOSE SERPL-MCNC: 111 MG/DL — HIGH (ref 70–99)
HCT VFR BLD CALC: 28.5 % — LOW (ref 37–47)
HGB BLD-MCNC: 9.2 G/DL — LOW (ref 12–16)
IMM GRANULOCYTES NFR BLD AUTO: 0.5 % — HIGH (ref 0.1–0.3)
LYMPHOCYTES # BLD AUTO: 1.58 K/UL — SIGNIFICANT CHANGE UP (ref 1.2–3.4)
LYMPHOCYTES # BLD AUTO: 13.1 % — LOW (ref 20.5–51.1)
MCHC RBC-ENTMCNC: 30.5 PG — SIGNIFICANT CHANGE UP (ref 27–31)
MCHC RBC-ENTMCNC: 32.3 G/DL — SIGNIFICANT CHANGE UP (ref 32–37)
MCV RBC AUTO: 94.4 FL — SIGNIFICANT CHANGE UP (ref 81–99)
MONOCYTES # BLD AUTO: 1.9 K/UL — HIGH (ref 0.1–0.6)
MONOCYTES NFR BLD AUTO: 15.8 % — HIGH (ref 1.7–9.3)
NEUTROPHILS # BLD AUTO: 8.15 K/UL — HIGH (ref 1.4–6.5)
NEUTROPHILS NFR BLD AUTO: 67.7 % — SIGNIFICANT CHANGE UP (ref 42.2–75.2)
NRBC # BLD: 0 /100 WBCS — SIGNIFICANT CHANGE UP (ref 0–0)
PLATELET # BLD AUTO: 144 K/UL — SIGNIFICANT CHANGE UP (ref 130–400)
PMV BLD: 12.1 FL — HIGH (ref 7.4–10.4)
POTASSIUM SERPL-MCNC: 4.3 MMOL/L — SIGNIFICANT CHANGE UP (ref 3.5–5)
POTASSIUM SERPL-MCNC: 4.4 MMOL/L — SIGNIFICANT CHANGE UP (ref 3.5–5)
POTASSIUM SERPL-SCNC: 4.3 MMOL/L — SIGNIFICANT CHANGE UP (ref 3.5–5)
POTASSIUM SERPL-SCNC: 4.4 MMOL/L — SIGNIFICANT CHANGE UP (ref 3.5–5)
PROT SERPL-MCNC: 5.4 G/DL — LOW (ref 6–8)
RBC # BLD: 3.02 M/UL — LOW (ref 4.2–5.4)
RBC # FLD: 15.6 % — HIGH (ref 11.5–14.5)
SODIUM SERPL-SCNC: 132 MMOL/L — LOW (ref 135–146)
SODIUM SERPL-SCNC: 137 MMOL/L — SIGNIFICANT CHANGE UP (ref 135–146)
WBC # BLD: 12.04 K/UL — HIGH (ref 4.8–10.8)
WBC # FLD AUTO: 12.04 K/UL — HIGH (ref 4.8–10.8)

## 2024-08-30 PROCEDURE — 99497 ADVNCD CARE PLAN 30 MIN: CPT | Mod: 25

## 2024-08-30 PROCEDURE — 93010 ELECTROCARDIOGRAM REPORT: CPT

## 2024-08-30 PROCEDURE — 99233 SBSQ HOSP IP/OBS HIGH 50: CPT

## 2024-08-30 RX ORDER — SENNA 187 MG
2 TABLET ORAL AT BEDTIME
Refills: 0 | Status: DISCONTINUED | OUTPATIENT
Start: 2024-08-30 | End: 2024-09-04

## 2024-08-30 RX ORDER — ACETAMINOPHEN 325 MG/1
975 TABLET ORAL EVERY 8 HOURS
Refills: 0 | Status: DISCONTINUED | OUTPATIENT
Start: 2024-08-30 | End: 2024-09-04

## 2024-08-30 RX ORDER — POLYETHYLENE GLYCOL 3350 17 G/17G
17 POWDER, FOR SOLUTION ORAL EVERY 12 HOURS
Refills: 0 | Status: DISCONTINUED | OUTPATIENT
Start: 2024-08-30 | End: 2024-09-04

## 2024-08-30 RX ORDER — ENOXAPARIN SODIUM 100 MG/ML
40 INJECTION SUBCUTANEOUS EVERY 24 HOURS
Refills: 0 | Status: DISCONTINUED | OUTPATIENT
Start: 2024-08-30 | End: 2024-09-04

## 2024-08-30 RX ORDER — OXYCODONE HYDROCHLORIDE 5 MG/1
5 TABLET ORAL ONCE
Refills: 0 | Status: DISCONTINUED | OUTPATIENT
Start: 2024-08-30 | End: 2024-08-30

## 2024-08-30 RX ADMIN — ENOXAPARIN SODIUM 40 MILLIGRAM(S): 100 INJECTION SUBCUTANEOUS at 13:58

## 2024-08-30 RX ADMIN — Medication 2 TABLET(S): at 21:21

## 2024-08-30 RX ADMIN — Medication 50 MILLIEQUIVALENT(S): at 02:20

## 2024-08-30 RX ADMIN — OXYCODONE HYDROCHLORIDE 5 MILLIGRAM(S): 5 TABLET ORAL at 06:50

## 2024-08-30 RX ADMIN — POLYETHYLENE GLYCOL 3350 17 GRAM(S): 17 POWDER, FOR SOLUTION ORAL at 17:10

## 2024-08-30 RX ADMIN — CEFAZOLIN SODIUM 100 MILLIGRAM(S): 2 INJECTION, SOLUTION INTRAVENOUS at 13:55

## 2024-08-30 RX ADMIN — CEFAZOLIN SODIUM 100 MILLIGRAM(S): 2 INJECTION, SOLUTION INTRAVENOUS at 21:21

## 2024-08-30 RX ADMIN — OXYCODONE HYDROCHLORIDE 5 MILLIGRAM(S): 5 TABLET ORAL at 03:05

## 2024-08-30 RX ADMIN — Medication 50 MILLIEQUIVALENT(S): at 04:21

## 2024-08-30 RX ADMIN — CEFAZOLIN SODIUM 100 MILLIGRAM(S): 2 INJECTION, SOLUTION INTRAVENOUS at 05:19

## 2024-08-30 RX ADMIN — OXYCODONE HYDROCHLORIDE 5 MILLIGRAM(S): 5 TABLET ORAL at 02:05

## 2024-08-30 NOTE — OCCUPATIONAL THERAPY INITIAL EVALUATION ADULT - VISUAL ASSESSMENT: VISUAL NEGLECT
[address] [place] [address] PRIMARY CARE  [address] [place] [address] [address]  [address] [address]  Dept: [address] Radha is a 77 y.o. female Established patient, who presents today for her medical conditions/complaints as noted below  Chief Complaint   Patient presents with    Diabetes     Patient checks blood sugar at home     Medication Check       HPI:     HPI  Sugars running last 3 weeks are in the 100 range. Low into 60's a couple of times. Really started watching after she saw GI doctor.      Reviewed prior notes GI  Reviewed previous Labs and Imaging    Hemoglobin A1C (%)   Date Value   06/09/2021 8.9   03/08/2021 9.5   10/26/2020 10.1             ( goal A1C is < 7)   No results found for: LABMICR  LDL Cholesterol (mg/dL)   Date Value   03/04/2021 68   10/23/2019 74   10/23/2019 74     LDL Calculated (mg/dL)   Date Value   09/12/2018 84   12/30/2016 84       (goal LDL is <100)   AST (U/L)   Date Value   03/04/2021 60 (H)     ALT (U/L)   Date Value   03/04/2021 45 (H)     BUN (mg/dL)   Date Value   03/04/2021 15     BP Readings from Last 3 Encounters:   06/09/21 (!) 146/82   03/29/21 132/78   03/08/21 (!) 142/74          (goal 140/90)    Past Medical History:   Diagnosis Date    Abdominal swelling     Autoimmune hepatitis ([address]     Flatus     H/O acute sinusitis     H/O cholelithiasis     LLQ abdominal tenderness     RUQ abdominal pain     Tingling     Vaginal candidiasis         Social History     Tobacco Use    Smoking status: Never Smoker    Smokeless tobacco: Never Used   Substance Use Topics    Alcohol use: Not on file      Current Outpatient Medications   Medication Sig Dispense Refill    JANUMET XR  MG TB24 per extended release tablet take 1 tablet by mouth twice a day 60 tablet 2    atorvastatin (LIPITOR) 10 MG tablet take 1 tablet by mouth once daily 90 tablet 3    lisinopril-hydroCHLOROthiazide (PRINZIDE;ZESTORETIC) 20-25 MG per tablet take 1 tablet by mouth once daily 90 tablet 3    gabapentin (NEURONTIN) 300 MG capsule take 2 capsules by mouth three times a day 180 capsule 3    B-D UF III MINI PEN NEEDLES 31G X 5 MM MISC use 1 PEN NEEDLE to inject MEDICATION subcutaneously four times a day with HUMALOG 100 each 3    TOUJEO SOLOSTAR 300 UNIT/ML SOPN inject 40 units subcutaneously twice a day 12 mL 3    glipiZIDE (GLUCOTROL) 10 MG tablet take 1 tablet by mouth twice a day with meals 60 tablet 2    ibuprofen (ADVIL;MOTRIN) 600 MG tablet Take 1 tablet by mouth every 6 hours as needed for Pain With food 60 tablet 0    HUMALOG KWIKPEN 100 UNIT/ML SOPN inject PER SLIDING SCALE BEFORE MEALS AND AT BEDTIME 4 TIMES DAILY, MAX 25 UNITS DAILY 15 mL 3    TRULICITY 3.31 ON/7.1GE SOPN   0    Cholecalciferol (VITAMIN D3) 50 MCG (2000 UT) CAPS Take 2 capsules by mouth daily 30 capsule     FREESTYLE LITE strip TEST three times a day 100 strip 2    Lancets MISC Test 4 times daily for uncontrolled  each 3    aspirin 81 MG tablet Take 81 mg by mouth daily      Lutein 20 MG TABS Take 1 tablet by mouth daily      Ascorbic Acid (VITAMIN C) 500 MG tablet Take by mouth daily Indications: as directed      tiZANidine (ZANAFLEX) 2 MG tablet Take 1 tablet by mouth nightly as needed (back pain) (Patient not taking: Reported on 6/9/2021) 30 tablet 0     No current facility-administered medications for this visit.      No Known Allergies    Health Maintenance   Topic Date Due    Hepatitis A vaccine (1 of 2 - Risk 2-dose series) Never done    DTaP/Tdap/Td vaccine (1 - Tdap) Never done    Shingles Vaccine (1 of 2) Never done    DEXA (modify frequency per FRAX score)  Never done    Diabetic foot exam  11/04/2020    Pneumococcal 65+ years Vaccine (2 of 2 - PPSV23) 03/16/2021    A1C test (Diabetic or Prediabetic)  09/09/2021    Diabetic microalbuminuria test  10/26/2021    Diabetic retinal exam  03/02/2022    Lipid screen  03/04/2022    Potassium monitoring  03/04/2022  Creatinine monitoring  03/04/2022    Colon cancer screen colonoscopy  03/30/2022    Breast cancer screen  12/22/2022    Flu vaccine  Completed    COVID-19 Vaccine  Completed    Hepatitis C screen  Completed    Hib vaccine  Aged Out    Meningococcal (ACWY) vaccine  Aged Out       Subjective:     Review of Systems   Constitutional: Negative for chills and fever. HENT: Negative for rhinorrhea and sore throat. Eyes: Negative for discharge and redness. Respiratory: Negative for cough, shortness of breath and wheezing. Cardiovascular: Negative for chest pain and palpitations. Gastrointestinal: Negative for abdominal pain, diarrhea, nausea and vomiting. Genitourinary: Positive for frequency. Negative for dysuria. Musculoskeletal: Negative for arthralgias and myalgias. Neurological: Positive for light-headedness (when sugars was low). Negative for dizziness and headaches. Psychiatric/Behavioral: Positive for sleep disturbance (sleep pattern is weird, but normal for her. Up to urinate a lot). Objective:     BP (!) 146/82   Pulse 82   Wt 213 lb (96.6 kg)   SpO2 98%   BMI 43.02 kg/m²   Physical Exam  Vitals and nursing note reviewed. Constitutional:       General: She is not in acute distress. Appearance: She is well-developed. She is not ill-appearing. HENT:      Head: Normocephalic and atraumatic. Right Ear: External ear normal.      Left Ear: External ear normal.   Eyes:      General: No scleral icterus. Right eye: No discharge. Left eye: No discharge. Conjunctiva/sclera: Conjunctivae normal.      Pupils: Pupils are equal, round, and reactive to light. Neck:      Thyroid: No thyromegaly. Trachea: No tracheal deviation. Cardiovascular:      Rate and Rhythm: Normal rate and regular rhythm. Heart sounds: Normal heart sounds. Pulmonary:      Effort: Pulmonary effort is normal. No respiratory distress.       Breath sounds: Normal breath sounds. No wheezing. Lymphadenopathy:      Cervical: No cervical adenopathy. Skin:     General: Skin is warm. Findings: No rash. Neurological:      Mental Status: She is alert and oriented to person, place, and time. Psychiatric:         Mood and Affect: Mood normal.         Behavior: Behavior normal.         Thought Content: Thought content normal.         Assessment/Plan:   1. Type 2 diabetes mellitus without complication, with long-term current use of insulin (HCC)  -     POCT glycosylated hemoglobin (Hb A1C)     Return in about 3 months (around 9/9/2021) for DM check. Orders Placed This Encounter   Procedures    POCT glycosylated hemoglobin (Hb A1C)     No orders of the defined types were placed in this encounter. Patient given educational materials - see patient instructions. Discussed use, benefit, and side effects of prescribed medications. All patient questions answered. Pt voiced understanding. Reviewed health maintenance. Instructed to continue current medications, diet and exercise. Patient agreed with treatment plan. Follow up as directed.      Electronicallysigned by Neema Armenta MD on 6/9/2021 at 4:18 PM none

## 2024-08-30 NOTE — OCCUPATIONAL THERAPY INITIAL EVALUATION ADULT - GENERAL OBSERVATIONS, REHAB EVAL
Pt encountered reclined in bed, + IV locked, + O2 via NC, + prima fit. Pt agreeable to bedside OT assessment, may be seen as confirmed with RN. Pt returned to bed in chair mode, + IV locked, + O2 via NC, + prima fit

## 2024-08-30 NOTE — OCCUPATIONAL THERAPY INITIAL EVALUATION ADULT - NSACTIVITYREC_GEN_A_OT
Patient requires assistance with activities of daily living. OT recommends D/C to home with assistance when medically appropriate.  Refer to evaluation/treatment for details.

## 2024-08-30 NOTE — PROGRESS NOTE ADULT - ASSESSMENT
89-year-old female with past medical history of hypertension, hypothyroidism, A-fib, not on blood thinners, coming from an [facility], presents to the ED after a mechanical fall with right-sided hip pain, admitted for  acute, comminuted displaced right femoral intertrochanteric fracture. s/p OR.     #Acute, comminuted displaced right femoral intertrochanteric fracture  - Patient with mechanical slip and fall   - CT demonstrating right sided fracture   - patient had operation by ortho 8/29 night  - Pain control with Morphine 2mg q6 hours prn  - f/u Pt/ot    #KASEY   - Baseline Cr 0.7, currently 1.1   - Likely prerenal   - UA negative   - No hydro on CT Scan   - Cr 0.9 8/30  - monitor BMP    #HTN  - holding home meds amlodipine and coreg    #hypothyroidism   - synthroid      #Misc  - DVT Prophylaxis: Lovenox   - Diet: DASH Diet   - Activity: IAT  - Code Status: DNR/DNI     89-year-old female with past medical history of hypertension, hypothyroidism, A-fib, not on blood thinners, [facility], presents to the ED after a mechanical fall with right-sided hip pain, admitted for  acute, comminuted displaced right femoral intertrochanteric fracture. s/p OR.     #Acute, comminuted displaced right femoral intertrochanteric fracture  - Patient with mechanical slip and fall   - CT demonstrating right sided fracture   - patient had operation by ortho 8/29 night  - Pain control with Morphine 2mg q6 hours prn  - tylenol 975 a8  - f/u Pt/ot  - monitor Hb    #KASEY   - Baseline Cr 0.7, currently 1.1   - Likely prerenal   - UA negative   - No hydro on CT Scan   - Cr 0.9 8/30  - monitor BMP    #HTN  - holding home meds amlodipine and coreg    #hypothyroidism   - synthroid      #Misc  - DVT Prophylaxis: Lovenox   - Diet: DASH Diet   - Activity: IAT  - Code Status: DNR/DNI  - bowel regimen - miralax, senna

## 2024-08-30 NOTE — PROGRESS NOTE ADULT - SUBJECTIVE AND OBJECTIVE BOX
Patient is a 89y old  Female who presents with a chief complaint of Hip fx (30 Aug 2024 11:39)    Patient was seen and examined.  C/o right hip pain  Denies any other complaints.  All systems reviewed positive history as mentioned above.    PAST MEDICAL & SURGICAL HISTORY:  Hypothyroidism  HTN (hypertension)  Glaucoma  History of cataract surgery, right    Allergies  Flagyl (Other)    Intolerances  Augmentin (Diarrhea)  NSAIDs (Other)  dairy products (Diarrhea)  Cipro (Diarrhea)    MEDICATIONS  (STANDING):  ceFAZolin   IVPB 2000 milliGRAM(s) IV Intermittent every 8 hours  enoxaparin Injectable 40 milliGRAM(s) SubCutaneous every 24 hours  polyethylene glycol 3350 17 Gram(s) Oral every 12 hours  senna 2 Tablet(s) Oral at bedtime    MEDICATIONS  (PRN):  acetaminophen     Tablet .. 975 milliGRAM(s) Oral every 8 hours PRN Mild Pain (1 - 3)  morphine  - Injectable 2 milliGRAM(s) IV Push every 6 hours PRN Severe Pain (7 - 10)    Vital Signs Last 24 Hrs  T(C): 36.7  T(F): 98  HR: 80  BP: 94/64  BP(mean): 87  RR: 18  SpO2: 90%    O/E:  Awake, alert, not in distress.  HEENT: atraumatic, EOMI.  Chest: decreased breath sounds at bases  CVS: SIS2 +, no murmur.  P/A: Soft, BS+  CNS: awake , alert  Ext: no edema feet. right hip dressing+  All systems reviewed positive findings as above.      POCT Blood Glucose.: 111 mg/dL (29 Aug 2024 01:09)                          9.2<L>  12.04<H> )-----------( 144      ( 30 Aug 2024 07:58 )             28.5<L>                        10.0<L>  16.23<H> )-----------( 170      ( 29 Aug 2024 23:08 )             31.3<L>    08-30    137  |  105  |  20  ----------------------------<  100<H>  4.4   |  20  |  0.9  08-29    137  |  104  |  18  ----------------------------<  128<H>  3.4<L>   |  21  |  0.9    Ca    8.0<L>      30 Aug 2024 07:58  Ca    8.1<L>      29 Aug 2024 23:08  Ca    8.5      29 Aug 2024 11:00  Ca    8.6      29 Aug 2024 01:00  Mg     1.9     08-29    TPro  5.4<L>  /  Alb  3.4<L>  /  TBili  0.6  /  DBili  x   /  AST  24  /  ALT  18  /  AlkPhos  107  08-30  TPro  5.5<L>  /  Alb  3.5  /  TBili  0.7  /  DBili  x   /  AST  23  /  ALT  22  /  AlkPhos  102  08-29  TPro  6.3  /  Alb  4.1  /  TBili  0.9  /  DBili  x   /  AST  29  /  ALT  30  /  AlkPhos  126<H>  08-29  TPro  7.1  /  Alb  4.1  /  TBili  0.4  /  DBili  x   /  AST  54<H>  /  ALT  38  /  AlkPhos  141<H>  08-29          PT/INR - ( 29 Aug 2024 01:00 )   PT: 13.10 sec;   INR: 1.15 ratio         PTT - ( 29 Aug 2024 01:00 )  PTT:29.9 sec  Urinalysis Basic - ( 30 Aug 2024 07:58 )    Color: x / Appearance: x / SG: x / pH: x  Gluc: 100 mg/dL / Ketone: x  / Bili: x / Urobili: x   Blood: x / Protein: x / Nitrite: x   Leuk Esterase: x / RBC: x / WBC x   Sq Epi: x / Non Sq Epi: x / Bacteria: x        Urinalysis with Rflx Culture (collected 29 Aug 2024 04:24)       (29 Aug 2024 10:43)

## 2024-08-30 NOTE — PHYSICAL THERAPY INITIAL EVALUATION ADULT - PERTINENT HX OF CURRENT PROBLEM, REHAB EVAL
89-year-old female with past medical history of hypertension, hypothyroidism, A-fib, not on blood thinners, coming from an [facility], presents to the ED after a mechanical fall with right-sided hip pain.  Patient states that she is slipped on her tile floor and fell onto her right side. After falling she was unable to get up. Remembers before and after the event.      In the ED BP was 180/83. , Afebrile. Labs significant for a wbc count of 16.51, Cr 1.1 (Baseline 0.7), BUN 27, , ALT 54, and Lipase 68. CT abdomen and pelvis showed acute, comminuted displaced right femoral intertrochanteric fracture.

## 2024-08-30 NOTE — GOALS OF CARE CONVERSATION - ADVANCED CARE PLANNING - CONVERSATION DETAILS
patient AOx3. Patient would like to be DNR/DNI. Pt would like non invasive ventilation when applicable. patient AOx3.. Discussed GOC.  Patient would like to be DNR/DNI. Pt would like non invasive ventilation when applicable.

## 2024-08-30 NOTE — PHYSICAL THERAPY INITIAL EVALUATION ADULT - ADDITIONAL COMMENTS
Pt resides alone in an apartment, + 2STE and then no stairs, + bath tub, +cane, uses uber to get around

## 2024-08-30 NOTE — PROGRESS NOTE ADULT - SUBJECTIVE AND OBJECTIVE BOX
ORTHOPEDIC POST-OP CHECK    Subjective: POD1 s/p right hip ORIF with IMN. Seen and examined at bedside. Doing well, pain controlled. Denies fevers, numbness/tingling. No other complaints.      MEDICATIONS  (STANDING):  acetaminophen   IVPB .. 1000 milliGRAM(s) IV Intermittent once  ceFAZolin   IVPB 2000 milliGRAM(s) IV Intermittent every 8 hours  potassium chloride  20 mEq/100 mL IVPB 20 milliEquivalent(s) IV Intermittent every 2 hours  sodium chloride 0.9%. 1000 milliLiter(s) (50 mL/Hr) IV Continuous <Continuous>    MEDICATIONS  (PRN):  dexAMETHasone  IVPB 8 milliGRAM(s) IV Intermittent once PRN Nausea and/or Vomiting  HYDROmorphone  Injectable 0.25 milliGRAM(s) IV Push every 30 minutes PRN Severe Pain (7 - 10)      Objective:  T(C): 36.8 (08-30-24 @ 01:36), Max: 37.2 (08-29-24 @ 22:15)  HR: 58 (08-30-24 @ 01:36) (52 - 91)  BP: 129/57 (08-30-24 @ 01:36) (119/59 - 161/61)  RR: 20 (08-30-24 @ 01:36) (15 - 20)  SpO2: 98% (08-30-24 @ 01:36) (94% - 98%)    Physical Exam:    RLE:  Dressing c/d/i  SILT s/s/sp/dp/t  Motor intact TA/EHL/GS  [facility]      Labs:                        10.0   16.23 )-----------( 170      ( 29 Aug 2024 23:08 )             31.3     08-29    137  |  104  |  18  ----------------------------<  128<H>  3.4<L>   |  21  |  0.9    Ca    8.1<L>      29 Aug 2024 23:08  Mg     1.9     08-29    TPro  5.5<L>  /  Alb  3.5  /  TBili  0.7  /  DBili  x   /  AST  23  /  ALT  22  /  AlkPhos  102  08-29      A/P: 89yFemale POD1 s/p right hip ORIF with IMN on 8/29. Doing well.    - Activity: WBAT RLE  - Abx: Ancef 2g q8hr x3 doses for a total of 24hrs post-operatively  - DVT PP may be resumed on 8/30 if Hgb stable  - Pain control as needed  - IS encouraged  - AM labs  - PT/Rehab  - D/C planning    - If discharged, patient should follow up with Dr. Monreal at [address] [facility] phone [phone].  - Patient should be discharged on 6 weeks (from surgery date) of Aspirin 325mg daily for DVT prophylaxis as their mobility/function/ambulation will be decreased due to lower extremity orthopaedic surgery. ORTHOPEDIC POST-OP CHECK    Subjective: POD1 s/p right hip ORIF with IMN. Seen and examined at bedside. Doing well, pain controlled. Denies fevers, numbness/tingling. No other complaints.      MEDICATIONS  (STANDING):  acetaminophen   IVPB .. 1000 milliGRAM(s) IV Intermittent once  ceFAZolin   IVPB 2000 milliGRAM(s) IV Intermittent every 8 hours  potassium chloride  20 mEq/100 mL IVPB 20 milliEquivalent(s) IV Intermittent every 2 hours  sodium chloride 0.9%. 1000 milliLiter(s) (50 mL/Hr) IV Continuous <Continuous>    MEDICATIONS  (PRN):  dexAMETHasone  IVPB 8 milliGRAM(s) IV Intermittent once PRN Nausea and/or Vomiting  HYDROmorphone  Injectable 0.25 milliGRAM(s) IV Push every 30 minutes PRN Severe Pain (7 - 10)      Objective:  T(C): 36.8 (08-30-24 @ 01:36), Max: 37.2 (08-29-24 @ 22:15)  HR: 58 (08-30-24 @ 01:36) (52 - 91)  BP: 129/57 (08-30-24 @ 01:36) (119/59 - 161/61)  RR: 20 (08-30-24 @ 01:36) (15 - 20)  SpO2: 98% (08-30-24 @ 01:36) (94% - 98%)    Physical Exam:    RLE:  Dressing c/d/i  SILT s/s/sp/dp/t  Motor intact TA/EHL/GS  [facility]      Labs:                        10.0   16.23 )-----------( 170      ( 29 Aug 2024 23:08 )             31.3     08-29    137  |  104  |  18  ----------------------------<  128<H>  3.4<L>   |  21  |  0.9    Ca    8.1<L>      29 Aug 2024 23:08  Mg     1.9     08-29    TPro  5.5<L>  /  Alb  3.5  /  TBili  0.7  /  DBili  x   /  AST  23  /  ALT  22  /  AlkPhos  102  08-29      A/P: 89yFemale POD1 s/p right hip ORIF with IMN on 8/29. Doing well.    - Activity: WBAT RLE  - Abx: Ancef 2g q8hr x3 doses for a total of 24hrs post-operatively  - DVT PP may be resumed today  - Pain control as needed  - IS encouraged  - AM labs  - PT/Rehab  - D/C planning    - If discharged, patient should follow up with Dr. Monreal at [address] [facility] phone [phone].  - Patient should be discharged on 6 weeks (from surgery date) of Aspirin 325mg daily for DVT prophylaxis as their mobility/function/ambulation will be decreased due to lower extremity orthopaedic surgery.

## 2024-08-30 NOTE — PROGRESS NOTE ADULT - SUBJECTIVE AND OBJECTIVE BOX
SUBJECTIVE/OVERNIGHT EVENTS  Today is hospital day 1d. This morning patient was seen and examined at bedside, resting comfortably in bed. No acute or major events overnight.    HOSPITAL COURSE  9-year-old female with past medical history of hypertension, hypothyroidism, A-fib, not on blood thinners, coming from an [facility], presents to the ED after a mechanical fall with right-sided hip pain.  Patient states that she is slipped on her tile floor and fell onto her right side. After falling she was unable to get up. Remembers before and after the event.  In the ED BP was 180/83. , Afebrile. Labs significant for a wbc count of 16.51, Cr 1.1 (Baseline 0.7), BUN 27, , ALT 54, and Lipase 68. CT abdomen and pelvis showed acute, comminuted displaced right femoral intertrochanteric fracture. Went to OR to have fixed by ortho      MEDICATIONS  STANDING MEDICATIONS  ceFAZolin   IVPB 2000 milliGRAM(s) IV Intermittent every 8 hours  enoxaparin Injectable 40 milliGRAM(s) SubCutaneous every 24 hours  polyethylene glycol 3350 17 Gram(s) Oral every 12 hours  senna 2 Tablet(s) Oral at bedtime    PRN MEDICATIONS  acetaminophen     Tablet .. 975 milliGRAM(s) Oral every 8 hours PRN  morphine  - Injectable 2 milliGRAM(s) IV Push every 6 hours PRN    VITALS  T(F): 97.8 (08-30-24 @ 07:51), Max: 99 (08-29-24 @ 22:15)  HR: 60 (08-30-24 @ 07:51) (58 - 91)  BP: 132/60 (08-30-24 @ 07:51) (119/59 - 156/72)  RR: 19 (08-30-24 @ 07:51) (15 - 20)  SpO2: 97% (08-30-24 @ 07:51) (94% - 98%)    PHYSICAL EXAM  GENERAL  ( x ) NAD, lying in bed comfortably     (  ) obtunded     (  ) lethargic     (  ) somnolent    HEAD  (  ) Atraumatic     (  ) hematoma     (  ) laceration (specify location:       )     NECK  (  ) Supple     (  ) neck stiffness     (  ) nuchal rigidity     (  )  no JVD     (  ) JVD present ( -- cm)    HEART  Rate -->  ( x ) normal rate    (  ) bradycardic    (  ) tachycardic  Rhythm -->  ( x ) regular    (  ) regularly irregular    (  ) irregularly irregular  Murmurs -->  (  ) normal s1/s2    (  ) systolic murmur    (  ) diastolic murmur    (  ) continuous murmur     (  ) S3 present    (  ) S4 present    LUNGS  (x  )Unlabored respirations     (  ) tachypnea  ( x ) B/L air entry     (  ) decreased breath sounds in:  (location     )    (  ) no adventitious sound     (  ) crackles     (  ) wheezing      (  ) rhonchi      (specify location:       )  (  ) chest wall tenderness (specify location:       )    ABDOMEN  ( x ) Soft     (  ) tense   |   (  ) nondistended     (  ) distended   |   (  ) +BS     (  ) hypoactive bowel sounds     (  ) hyperactive bowel sounds  (x  ) nontender     (  ) RUQ tenderness     (  ) RLQ tenderness     (  ) LLQ tenderness     (  ) epigastric tenderness     (  ) diffuse tenderness  (  ) Splenomegaly      (  ) Hepatomegaly      (  ) Jaundice     (  ) ecchymosis     EXTREMITIES  - tendereness to palp on R hip     NERVOUS SYSTEM  ( x A&Ox3     (  ) confused     (  ) lethargic  CN II-XII:     (  ) Intact     (  ) focal deficits  (Specify:     )   Upper extremities:     (  ) strength X/5     (  ) focal deficit (specify:    )  Lower extremities:     (  ) strength  X/5    (  ) focal deficit (specify:    )      LABS             9.2    12.04 )-----------( 144      ( 08-30-24 @ 07:58 )             28.5     137  |  105  |  20  -------------------------<  100   08-30-24 @ 07:58  4.4  |  20  |  0.9    Ca      8.0     08-30-24 @ 07:58  Mg     1.9     08-29-24 @ 23:08    TPro  5.4  /  Alb  3.4  /  TBili  0.6  /  DBili  x   /  AST  24  /  ALT  18  /  AlkPhos  107  /  GGT  x     08-30-24 @ 07:58    PT/INR - ( 08-29-24 @ 01:00 )   PT: 13.10 sec<H>;   INR: 1.15 ratio  PTT - ( 08-29-24 @ 01:00 )  PTT:29.9 sec      Urinalysis Basic - ( 30 Aug 2024 07:58 )    Color: x / Appearance: x / SG: x / pH: x  Gluc: 100 mg/dL / Ketone: x  / Bili: x / Urobili: x   Blood: x / Protein: x / Nitrite: x   Leuk Esterase: x / RBC: x / WBC x   Sq Epi: x / Non Sq Epi: x / Bacteria: x          Urinalysis with Rflx Culture (collected 29 Aug 2024 04:24)      IMAGING

## 2024-08-30 NOTE — PROGRESS NOTE ADULT - ASSESSMENT
89-year-old female with past medical history of hypertension, hypothyroidism, A-fib, not on blood thinners, coming from an [place], presents to the ED after a mechanical fall with right-sided hip pain.  Patient states that she is slipped on her tile floor and fell onto her right side. After falling she was unable to get up. Remembers before and after the event.      # Acute, comminuted displaced right femoral intertrochanteric fracture sec to mechanical fall  - CT Abdomen and Pelvis w/ IV Cont (08.29.24 @ 03:16) >Acute, comminuted displaced right femoral intertrochanteric fracture  - s/p right hip ORIF with IMN on 8/29.  -  WBAT RLE  - start pain meds  - physiatry and PT eval    # Acute kidney injury, prerenal  - resolved  # Hypokalemia - resolved    # Reactive leucocytosis  - monitor cbc    # Hypotension  # Paroxysmal afib, now in SR  - hold amlodipine,  coreg  - not on AC  - monitor BP    # Hypothyroidism  - c/w  synthroid    # DVT prophylaxis    # GOC discussed  DNR/ DNI/ trial of NIV    # Pending: PT eval, physiatry eval, monitor cbc, monitor BP  # Discussed plan of care with patient  # Disposition: STR

## 2024-08-30 NOTE — OCCUPATIONAL THERAPY INITIAL EVALUATION ADULT - ADDITIONAL COMMENTS
Pt resides alone in an apartment, + 2STE and then no stairs, + bath tub, +cane, uses uber to get around,

## 2024-08-30 NOTE — OCCUPATIONAL THERAPY INITIAL EVALUATION ADULT - TRANSFER TRAINING, PT EVAL
Pt will perform sit<>stand with mod A and bed <>chair with Max A using most appropriate AD by discharge

## 2024-08-30 NOTE — OCCUPATIONAL THERAPY INITIAL EVALUATION ADULT - PERTINENT HX OF CURRENT PROBLEM, REHAB EVAL
89-year-old female with past medical history of hypertension, hypothyroidism, A-fib, not on blood thinners, coming from an [facility], presents to the ED after a mechanical fall with right-sided hip pain.  Patient states that she is slipped on her tile floor and fell onto her right side. After falling she was unable to get up. Remembers before and after the event.      In the ED BP was 180/83. , Afebrile. Labs significant for a wbc count of 16.51, Cr 1.1 (Baseline 0.7), BUN 27, , ALT 54, and Lipase 68. CT abdomen and pelvis showed acute, comminuted displaced right femoral intertrochanteric fracture.

## 2024-08-31 LAB
ALBUMIN SERPL ELPH-MCNC: 3.1 G/DL — LOW (ref 3.5–5.2)
ALP SERPL-CCNC: 85 U/L — SIGNIFICANT CHANGE UP (ref 30–115)
ALT FLD-CCNC: 8 U/L — SIGNIFICANT CHANGE UP (ref 0–41)
ANION GAP SERPL CALC-SCNC: 12 MMOL/L — SIGNIFICANT CHANGE UP (ref 7–14)
AST SERPL-CCNC: 20 U/L — SIGNIFICANT CHANGE UP (ref 0–41)
BASOPHILS # BLD AUTO: 0.05 K/UL — SIGNIFICANT CHANGE UP (ref 0–0.2)
BASOPHILS NFR BLD AUTO: 0.4 % — SIGNIFICANT CHANGE UP (ref 0–1)
BILIRUB SERPL-MCNC: 0.8 MG/DL — SIGNIFICANT CHANGE UP (ref 0.2–1.2)
BUN SERPL-MCNC: 14 MG/DL — SIGNIFICANT CHANGE UP (ref 10–20)
CALCIUM SERPL-MCNC: 7.7 MG/DL — LOW (ref 8.4–10.5)
CHLORIDE SERPL-SCNC: 103 MMOL/L — SIGNIFICANT CHANGE UP (ref 98–110)
CO2 SERPL-SCNC: 19 MMOL/L — SIGNIFICANT CHANGE UP (ref 17–32)
CREAT SERPL-MCNC: 0.7 MG/DL — SIGNIFICANT CHANGE UP (ref 0.7–1.5)
EGFR: 83 ML/MIN/1.73M2 — SIGNIFICANT CHANGE UP
EOSINOPHIL # BLD AUTO: 0.18 K/UL — SIGNIFICANT CHANGE UP (ref 0–0.7)
EOSINOPHIL NFR BLD AUTO: 1.4 % — SIGNIFICANT CHANGE UP (ref 0–8)
GLUCOSE BLDC GLUCOMTR-MCNC: 112 MG/DL — HIGH (ref 70–99)
GLUCOSE SERPL-MCNC: 97 MG/DL — SIGNIFICANT CHANGE UP (ref 70–99)
HCT VFR BLD CALC: 25 % — LOW (ref 37–47)
HCT VFR BLD CALC: 25.8 % — LOW (ref 37–47)
HGB BLD-MCNC: 8.1 G/DL — LOW (ref 12–16)
HGB BLD-MCNC: 8.4 G/DL — LOW (ref 12–16)
IMM GRANULOCYTES NFR BLD AUTO: 0.6 % — HIGH (ref 0.1–0.3)
LYMPHOCYTES # BLD AUTO: 1.12 K/UL — LOW (ref 1.2–3.4)
LYMPHOCYTES # BLD AUTO: 8.8 % — LOW (ref 20.5–51.1)
MAGNESIUM SERPL-MCNC: 2.1 MG/DL — SIGNIFICANT CHANGE UP (ref 1.8–2.4)
MCHC RBC-ENTMCNC: 30 PG — SIGNIFICANT CHANGE UP (ref 27–31)
MCHC RBC-ENTMCNC: 30.4 PG — SIGNIFICANT CHANGE UP (ref 27–31)
MCHC RBC-ENTMCNC: 32.4 G/DL — SIGNIFICANT CHANGE UP (ref 32–37)
MCHC RBC-ENTMCNC: 32.6 G/DL — SIGNIFICANT CHANGE UP (ref 32–37)
MCV RBC AUTO: 92.6 FL — SIGNIFICANT CHANGE UP (ref 81–99)
MCV RBC AUTO: 93.5 FL — SIGNIFICANT CHANGE UP (ref 81–99)
MONOCYTES # BLD AUTO: 1.88 K/UL — HIGH (ref 0.1–0.6)
MONOCYTES NFR BLD AUTO: 14.7 % — HIGH (ref 1.7–9.3)
NEUTROPHILS # BLD AUTO: 9.49 K/UL — HIGH (ref 1.4–6.5)
NEUTROPHILS NFR BLD AUTO: 74.1 % — SIGNIFICANT CHANGE UP (ref 42.2–75.2)
NRBC # BLD: 0 /100 WBCS — SIGNIFICANT CHANGE UP (ref 0–0)
NRBC # BLD: 0 /100 WBCS — SIGNIFICANT CHANGE UP (ref 0–0)
PHOSPHATE SERPL-MCNC: 2.5 MG/DL — SIGNIFICANT CHANGE UP (ref 2.1–4.9)
PLATELET # BLD AUTO: 127 K/UL — LOW (ref 130–400)
PLATELET # BLD AUTO: 131 K/UL — SIGNIFICANT CHANGE UP (ref 130–400)
PMV BLD: 12.3 FL — HIGH (ref 7.4–10.4)
PMV BLD: 12.3 FL — HIGH (ref 7.4–10.4)
POTASSIUM SERPL-MCNC: 3.7 MMOL/L — SIGNIFICANT CHANGE UP (ref 3.5–5)
POTASSIUM SERPL-SCNC: 3.7 MMOL/L — SIGNIFICANT CHANGE UP (ref 3.5–5)
PROT SERPL-MCNC: 5.1 G/DL — LOW (ref 6–8)
RBC # BLD: 2.7 M/UL — LOW (ref 4.2–5.4)
RBC # BLD: 2.76 M/UL — LOW (ref 4.2–5.4)
RBC # FLD: 15.1 % — HIGH (ref 11.5–14.5)
RBC # FLD: 15.3 % — HIGH (ref 11.5–14.5)
SODIUM SERPL-SCNC: 134 MMOL/L — LOW (ref 135–146)
WBC # BLD: 12.8 K/UL — HIGH (ref 4.8–10.8)
WBC # BLD: 14.95 K/UL — HIGH (ref 4.8–10.8)
WBC # FLD AUTO: 12.8 K/UL — HIGH (ref 4.8–10.8)
WBC # FLD AUTO: 14.95 K/UL — HIGH (ref 4.8–10.8)

## 2024-08-31 PROCEDURE — 99232 SBSQ HOSP IP/OBS MODERATE 35: CPT

## 2024-08-31 RX ORDER — FERROUS SULFATE 325(65) MG
325 TABLET ORAL DAILY
Refills: 0 | Status: DISCONTINUED | OUTPATIENT
Start: 2024-08-31 | End: 2024-09-04

## 2024-08-31 RX ADMIN — ENOXAPARIN SODIUM 40 MILLIGRAM(S): 100 INJECTION SUBCUTANEOUS at 11:12

## 2024-08-31 RX ADMIN — Medication 2 TABLET(S): at 21:17

## 2024-08-31 RX ADMIN — Medication 325 MILLIGRAM(S): at 11:11

## 2024-08-31 RX ADMIN — POLYETHYLENE GLYCOL 3350 17 GRAM(S): 17 POWDER, FOR SOLUTION ORAL at 05:25

## 2024-08-31 RX ADMIN — POLYETHYLENE GLYCOL 3350 17 GRAM(S): 17 POWDER, FOR SOLUTION ORAL at 17:18

## 2024-08-31 NOTE — PROGRESS NOTE ADULT - SUBJECTIVE AND OBJECTIVE BOX
SUBJECTIVE/OVERNIGHT EVENTS  Today is hospital day 2d. This morning patient was seen and examined at bedside, resting comfortably in bed. No acute or major events overnight.    HOSPITAL COURSE  89-year-old female with past medical history of hypertension, hypothyroidism, A-fib, not on blood thinners, coming from an [facility], presents to the ED after a mechanical fall with right-sided hip pain.  Patient states that she is slipped on her tile floor and fell onto her right side. After falling she was unable to get up. Remembers before and after the event.  In the ED BP was 180/83. , Afebrile. Labs significant for a wbc count of 16.51, Cr 1.1 (Baseline 0.7), BUN 27, , ALT 54, and Lipase 68. CT abdomen and pelvis showed acute, comminuted displaced right femoral intertrochanteric fracture. Went to OR to have fixed by ortho. s/p fixation.     MEDICATIONS  STANDING MEDICATIONS  enoxaparin Injectable 40 milliGRAM(s) SubCutaneous every 24 hours  polyethylene glycol 3350 17 Gram(s) Oral every 12 hours  senna 2 Tablet(s) Oral at bedtime    PRN MEDICATIONS  acetaminophen     Tablet .. 975 milliGRAM(s) Oral every 8 hours PRN  morphine  - Injectable 2 milliGRAM(s) IV Push every 6 hours PRN    VITALS  T(F): 98.7 (08-31-24 @ 00:41), Max: 98.7 (08-31-24 @ 00:41)  HR: 83 (08-31-24 @ 00:41) (80 - 83)  BP: 137/75 (08-31-24 @ 00:41) (94/64 - 137/75)  RR: 18 (08-31-24 @ 00:41) (18 - 18)  SpO2: 93% (08-31-24 @ 00:41) (90% - 93%)    PHYSICAL EXAM  GENERAL  ( x ) NAD, lying in bed comfortably     (  ) obtunded     (  ) lethargic     (  ) somnolent    HEAD  (  ) Atraumatic     (  ) hematoma     (  ) laceration (specify location:       )     NECK  (  ) Supple     (  ) neck stiffness     (  ) nuchal rigidity     (  )  no JVD     (  ) JVD present ( -- cm)    HEART  Rate -->  (  x) normal rate    (  ) bradycardic    (  ) tachycardic  Rhythm -->  (x  ) regular    (  ) regularly irregular    (  ) irregularly irregular  Murmurs -->  (  ) normal s1/s2    (  ) systolic murmur    (  ) diastolic murmur    (  ) continuous murmur     (  ) S3 present    (  ) S4 present    LUNGS  ( x )Unlabored respirations     (  ) tachypnea  (x  ) B/L air entry     (  ) decreased breath sounds in:  (location     )    (  ) no adventitious sound     (  ) crackles     (  ) wheezing      (  ) rhonchi      (specify location:       )  (  ) chest wall tenderness (specify location:       )    ABDOMEN  ( x ) Soft     (  ) tense   |   (  ) nondistended     (  ) distended   |   (  ) +BS     (  ) hypoactive bowel sounds     (  ) hyperactive bowel sounds  ( x ) nontender     (  ) RUQ tenderness     (  ) RLQ tenderness     (  ) LLQ tenderness     (  ) epigastric tenderness     (  ) diffuse tenderness  (  ) Splenomegaly      (  ) Hepatomegaly      (  ) Jaundice     (  ) ecchymosis     EXTREMITIES  - R hip tenderness to palp     LABS             8.1    12.80 )-----------( 127      ( 08-31-24 @ 06:31 )             25.0     132  |  102  |  20  -------------------------<  111   08-30-24 @ 16:58  4.3  |  18  |  0.9    Ca      7.8     08-30-24 @ 16:58  Mg     1.9     08-29-24 @ 23:08    TPro  5.4  /  Alb  3.4  /  TBili  0.6  /  DBili  x   /  AST  24  /  ALT  18  /  AlkPhos  107  /  GGT  x     08-30-24 @ 07:58        Urinalysis Basic - ( 30 Aug 2024 16:58 )    Color: x / Appearance: x / SG: x / pH: x  Gluc: 111 mg/dL / Ketone: x  / Bili: x / Urobili: x   Blood: x / Protein: x / Nitrite: x   Leuk Esterase: x / RBC: x / WBC x   Sq Epi: x / Non Sq Epi: x / Bacteria: x          Urinalysis with Rflx Culture (collected 29 Aug 2024 04:24)      IMAGING

## 2024-08-31 NOTE — PROGRESS NOTE ADULT - SUBJECTIVE AND OBJECTIVE BOX
Patient is a 89y old  Female who presents with a chief complaint of Hip fx (30 Aug 2024 11:39)    Patient was seen and examined.  no new complaints    PAST MEDICAL & SURGICAL HISTORY:  Hypothyroidism  HTN (hypertension)  Glaucoma  History of cataract surgery, right    Allergies  Flagyl (Other)    Intolerances  Augmentin (Diarrhea)  NSAIDs (Other)  dairy products (Diarrhea)  Cipro (Diarrhea)    MEDICATIONS  (STANDING):  enoxaparin Injectable 40 milliGRAM(s) SubCutaneous every 24 hours  ferrous    sulfate 325 milliGRAM(s) Oral daily  polyethylene glycol 3350 17 Gram(s) Oral every 12 hours  senna 2 Tablet(s) Oral at bedtime    MEDICATIONS  (PRN):  acetaminophen     Tablet .. 975 milliGRAM(s) Oral every 8 hours PRN Mild Pain (1 - 3)  morphine  - Injectable 2 milliGRAM(s) IV Push every 6 hours PRN Severe Pain (7 - 10)    T(C): 36.8 (08-31-24 @ 07:00), Max: 37.1 (08-31-24 @ 00:41)  HR: 82 (08-31-24 @ 07:00) (82 - 83)  BP: 102/60 (08-31-24 @ 07:00) (102/60 - 137/75)  RR: 18 (08-31-24 @ 07:00) (18 - 18)  SpO2: 95% (08-31-24 @ 07:00) (93% - 95%)    O/E:  Awake, alert, not in distress.  HEENT: atraumatic, EOMI.  Chest: decreased breath sounds at bases  CVS: SIS2 +, no murmur.  P/A: Soft, BS+  CNS: awake , alert  Ext: no edema feet. right hip dressing+  All systems reviewed positive findings as above.                          8.1<L>  12.80<H> )-----------( 127<L>    ( 31 Aug 2024 06:31 )             25.0<L>                        9.2<L>  12.04<H> )-----------( 144      ( 30 Aug 2024 07:58 )             28.5<L>  08-31    134<L>  |  103  |  14  ----------------------------<  97  3.7   |  19  |  0.7  08-30    132<L>  |  102  |  20  ----------------------------<  111<H>  4.3   |  18  |  0.9    Ca    7.7<L>      31 Aug 2024 06:31  Ca    7.8<L>      30 Aug 2024 16:58  Ca    8.0<L>      30 Aug 2024 07:58  Ca    8.1<L>      29 Aug 2024 23:08  Phos  2.5     08-31  Mg     2.1     08-31    TPro  5.1<L>  /  Alb  3.1<L>  /  TBili  0.8  /  DBili  x   /  AST  20  /  ALT  8   /  AlkPhos  85  08-31  TPro  5.4<L>  /  Alb  3.4<L>  /  TBili  0.6  /  DBili  x   /  AST  24  /  ALT  18  /  AlkPhos  107  08-30  TPro  5.5<L>  /  Alb  3.5  /  TBili  0.7  /  DBili  x   /  AST  23  /  ALT  22  /  AlkPhos  102  08-29

## 2024-08-31 NOTE — CONSULT NOTE ADULT - SUBJECTIVE AND OBJECTIVE BOX
Orthopaedic Surgery Consult Note    89yo Female  past medical history of hypertension, hypothyroidism, A-fib, not on blood thinners presents with right hip fracture s/p mechanical fall. Patient lives in an assisted living facility and fell onto her right hip earlier this evening. Unsure if she sustained head trauma, no LOC, no prodromal symptoms. Ambulates with cane at baseline. Pain in right hip. No pain anyhwere else. Denies numbness and tingling    PMH/PSH  MEWS Score    Prior    Diverticulitis    Osteoporosis    Hypothyroidism    HTN (hypertension)    Conway's esophagus    Depression    Glaucoma    Diverticulitis    Osteoporosis    Hypothyroidism    HTN (hypertension)    Conway's esophagus    Depression    History of cataract surgery, right    History of cataract surgery, right    FALL    90+    [id number]        Medications      Home Medications:  carvedilol 12.5 mg oral tablet: 1 tab(s) orally 2 times a day (30 Jan 2024 12:15)  LATANOPROST 0.005% EYE DROPS: 1 applicator to each affected eye once a day (at bedtime) INSTILL 1 DROP INTO AFFECTED EYE(S) ONCE DAILY IN THE EVENING (30 Jan 2024 11:58)  LEVOTHYROXINE 88 MCG TABLET: 1 tab(s) orally once a day TAKE 1 TABLET BY MOUTH EVERY DAY (30 Jan 2024 11:58)  pantoprazole 40 mg oral delayed release tablet: 1 tab(s) orally every 12 hours (29 Jan 2024 11:48)        Allergies  Augmentin (Diarrhea)  NSAIDs (Other)  dairy products (Diarrhea)  Cipro (Diarrhea)  Flagyl (Other)        T(C): 36.6 (08-29-24 @ 00:20), Max: 36.6 (08-29-24 @ 00:20)  HR: 84 (08-29-24 @ 00:45) (84 - 101)  BP: 145/101 (08-29-24 @ 00:45) (145/101 - 180/83)  RR: 16 (08-29-24 @ 00:45) (16 - 18)  SpO2: 95% (08-29-24 @ 00:45) (94% - 95%)    P/E:  AOx3, NAD  Non-labored breathing    RLE  Skin intact  TTP over groin  ROM hip limited by pain  short and externally rotated extremity  Compartments soft and compressible, no pain w/ passive stretch of digits  SILT SP/DP/T/Sural/Saph  Firing TA/EHL/FHL/GS  DP pulse 2+, cap refill <2        Labs                        12.9   16.51 )-----------( 202      ( 29 Aug 2024 01:00 )             39.6     08-29    138  |  104  |  27<H>  ----------------------------<  116<H>  5.4<H>   |  19  |  1.1    Ca    8.6      29 Aug 2024 01:00    TPro  7.1  /  Alb  4.1  /  TBili  0.4  /  DBili  x   /  AST  54<H>  /  ALT  38  /  AlkPhos  141<H>  08-29    LIVER FUNCTIONS - ( 29 Aug 2024 01:00 )  Alb: 4.1 g/dL / Pro: 7.1 g/dL / ALK PHOS: 141 U/L / ALT: 38 U/L / AST: 54 U/L / GGT: x           PT/INR - ( 29 Aug 2024 01:00 )   PT: 13.10 sec;   INR: 1.15 ratio         PTT - ( 29 Aug 2024 01:00 )  PTT:29.9 sec    Imaging:  XR pelvis  right intertrochanteric hip fracture    A/P:  89yo Female w/ right intertrochanteric hip fracture    Patient added on for right hip ORIF with Dr. Monreal today  NPO with IV fluids  Weight bearing: bedrest  Pain control  DVT ppx: can hold prior to surgery  Please obtain preop labs: bmp, cbc, coags, type and screen x2, ekg, cxr  Medical risk stratification note  2 units of PRBC on hold for OR      
HPI:  89-year-old left-handed  female with past medical history of hypertension, hypothyroidism, A-fib, not on blood thinners, coming from an [facility], presents to the ED after a mechanical fall with right-sided hip pain.  Patient states that she is slipped on her tile floor and fell onto her right side. After falling she was unable to get up. Remembers before and after the event.      In the ED BP was 180/83. , Afebrile. Labs significant for a wbc count of 16.51, Cr 1.1 (Baseline 0.7), BUN 27, , ALT 54, and Lipase 68. CT abdomen and pelvis showed acute, comminuted displaced right femoral intertrochanteric fracture.    Vital Signs Last 24 Hrs  T(C): 36.8 (29 Aug 2024 07:34), Max: 36.8 (29 Aug 2024 07:34)  T(F): 98.2 (29 Aug 2024 07:34), Max: 98.2 (29 Aug 2024 07:34)  HR: 52 (29 Aug 2024 07:34) (52 - 101)  BP: 161/61 (29 Aug 2024 07:34) (145/101 - 180/83)  BP(mean): 115 (29 Aug 2024 00:45) (115 - 115)  RR: 18 (29 Aug 2024 07:34) (16 - 18)  SpO2: 96% (29 Aug 2024 07:34) (94% - 96%)    Parameters below as of 29 Aug 2024 07:34  Patient On (Oxygen Delivery Method): room air     (29 Aug 2024 10:43)      PAST MEDICAL & SURGICAL HISTORY:  Hypothyroidism      HTN (hypertension)      Glaucoma      History of cataract surgery, right          HOSPITAL COURSE: Imaging studies obtained.   Pre-Op Diagnosis	PRE-OP DIAGNOSIS:  Intertrochanteric fracture of right hip 29-Aug-2024 22:09:44  Nickie Monreal  Post-Op Diagnosis	POST-OP DIAGNOSIS:  Intertrochanteric fracture of right hip 29-Aug-2024 22:09:51  Nickie Monreal  Procedure	PROCEDURES:  Antegrade intertrochanteric nailing of femur 29-Aug-2024 22:09:24  Nickie Monreal  Operative Findings	Right closed displaced pertrochanteric femur fracture      Seen by Physical Therapy and Occupational Therapy 8/30/2024.       TODAY'S SUBJECTIVE & REVIEW OF SYMPTOMS:    Constitutional WNL  Cardiac WNL   Respiratory WNL  GI WNL   WNL  Endocrine WNL  Skin WNL  Musculoskeletal + right hip pain  Neuro + [place]  Cognitive WNL  Psych WNL      MEDICATIONS  (STANDING):  enoxaparin Injectable 40 milliGRAM(s) SubCutaneous every 24 hours  ferrous    sulfate 325 milliGRAM(s) Oral daily  polyethylene glycol 3350 17 Gram(s) Oral every 12 hours  senna 2 Tablet(s) Oral at bedtime    MEDICATIONS  (PRN):  acetaminophen     Tablet .. 975 milliGRAM(s) Oral every 8 hours PRN Mild Pain (1 - 3)  morphine  - Injectable 2 milliGRAM(s) IV Push every 6 hours PRN Severe Pain (7 - 10)      FAMILY HISTORY:      Allergies    Flagyl (Other)    Intolerances    Augmentin (Diarrhea)  NSAIDs (Other)  dairy products (Diarrhea)  Cipro (Diarrhea)      SOCIAL HISTORY:    [  ] Smoking  [  ] EtOH  [  ] Substance abuse     Home Environment:  [ X ] Alone  [  ] Lives with Family  [  ] Home Health Aide    Dwelling:  [  ] Private House  [  ] Apartment  [ X ] Adult Home/Assisted Living Facility--[place]  [  ] Skilled Nursing Facility      [  ] Short Term  [  ] Long Term  [  ] Stairs       Elevator [  ]    FUNCTIONAL STATUS PTA: (Check all that apply)  Ambulation: [ X  ]Independent    [  ] Dependent     [  ] Non-Ambulatory  Assistive Device: [ X ] Straight Cane  [  ]  Quad Cane  [  ] Walker  [  ]  Wheelchair  ADL: [X  ] Independent  [  ]  Dependent       Vital Signs Last 24 Hrs  T(C): 36.8 (31 Aug 2024 07:00), Max: 37.1 (31 Aug 2024 00:41)  T(F): 98.2 (31 Aug 2024 07:00), Max: 98.7 (31 Aug 2024 00:41)  HR: 82 (31 Aug 2024 07:00) (80 - 83)  BP: 102/60 (31 Aug 2024 07:00) (94/64 - 137/75)  BP(mean): --  RR: 18 (31 Aug 2024 07:00) (18 - 18)  SpO2: 95% (31 Aug 2024 07:00) (90% - 95%)    Parameters below as of 31 Aug 2024 07:00  Patient On (Oxygen Delivery Method): room air          PHYSICAL EXAM: Alert and oriented X 4  GENERAL: Well-developed, well-nourished, in NAD  HEAD:  Normocephalic, atraumatic  EYES: EOMI, PERRLA, sclerae anicteric, conjunctivae not injected  NECK: Supple, No JVD, Normal thyroid  CHEST/LUNG: Clear to auscultation bilaterally; No rales, rhonchi, wheezing  HEART: Regular rate and rhythm; No murmurs, gallops, or rubs  ABDOMEN: Soft, nontender, nondistended; Bowel sounds present  EXTREMITIES:  + intrinsic muscle atrophy hands, +right thigh dressings in place, 2+ Peripheral pulses; No clubbing, cyanosis, or edema    NERVOUS SYSTEM:  Cranial Nerves II-XII intact [  ] Abnormal  [X  ] + [place]  ROM: WFL all extremities [  ]  Abnormal [ X ]  Motor Strength: WFL all extremities  [  ]  Abnormal [ X ]  Sensation: intact to light touch [ X ] Abnormal [  ]  Reflexes: Symmetric [  ]  Abnormal [  ]    FUNCTIONAL STATUS:  Bed Mobility: Independent [  ]  Supervision [  ]  Needs Assistance [ X ]  N/A [  ]  Transfers: Independent [  ]  Supervision [  ]  Needs Assistance [ X ]  N/A [  ]   Ambulation: Independent [  ]  Supervision [  ]  Needs Assistance [ X ]  N/A [  ]  ADLs: Independent [  ] Requires Assistance [ X ] N/A [  ]      LABS:                        8.1    12.80 )-----------( 127      ( 31 Aug 2024 06:31 )             25.0     08-31    134<L>  |  103  |  14  ----------------------------<  97  3.7   |  19  |  0.7    Ca    7.7<L>      31 Aug 2024 06:31  Phos  2.5     08-31  Mg     2.1     08-31    TPro  5.1<L>  /  Alb  3.1<L>  /  TBili  0.8  /  DBili  x   /  AST  20  /  ALT  8   /  AlkPhos  85  08-31      Urinalysis Basic - ( 31 Aug 2024 06:31 )    Color: x / Appearance: x / SG: x / pH: x  Gluc: 97 mg/dL / Ketone: x  / Bili: x / Urobili: x   Blood: x / Protein: x / Nitrite: x   Leuk Esterase: x / RBC: x / WBC x   Sq Epi: x / Non Sq Epi: x / Bacteria: x        RADIOLOGY & ADDITIONAL STUDIES:  < from: Xray Pelvis AP only (08.29.24 @ 03:25) >    ACC: [id number] EXAM:  XR PELVIS AP ONLY 1-2 VIEWS   ORDERED BY: YOAN KAUFFMAN     PROCEDURE DATE:  08/29/2024          INTERPRETATION:  Clinical History / Reason for exam: Trauma.    Technique: 2 x-rays of AP pelvis obtained.    Comparison made with CT from same day    Findings:    Acute, comminuted displaced right femoral intertrochanteric fracture.    Impression:    Acute, comminuted displaced right femoral intertrochanteric fracture.    --- End of Report ---            JAYME LEAL MD; Attending Radiologist  This document has been electronically signed. Aug 29 2024  3:38AM    < end of copied text >  < from: Xray Femur 2 Views, Right (08.29.24 @ 03:23) >    ACC: [id number] EXAM:  XR KNEE W PATELLA 3 VIEWS RT   ORDERED BY: YOAN KAUFFMAN     ACC: [id number] EXAM:  XR FEMUR 2 VIEWS RT   ORDERED BY: YOAN KAUFFMAN     PROCEDURE DATE:  08/29/2024          INTERPRETATION:  Clinical History / Reason for exam: Trauma.    Views of the right femur. Views of the right knee.    Findings/  impression:    Comminuted intertrochanteric fracture with varus angulation.    Degenerative changes of the right knee.    --- End of Report ---            DEYSI RUIZ MD;Attending Interventional Radiologist  This document has been electronically signed. Aug 29 2024 12:55PM    < end of copied text >

## 2024-08-31 NOTE — PROGRESS NOTE ADULT - ASSESSMENT
89-year-old female with past medical history of hypertension, hypothyroidism, A-fib, not on blood thinners, coming from an [facility], presents to the ED after a mechanical fall with right-sided hip pain.  Patient states that she is slipped on her tile floor and fell onto her right side. After falling she was unable to get up. Remembers before and after the event.      # Acute, comminuted displaced right femoral intertrochanteric fracture sec to mechanical fall  - CT Abdomen and Pelvis w/ IV Cont (08.29.24 @ 03:16) >Acute, comminuted displaced right femoral intertrochanteric fracture  - s/p right hip ORIF with IMN on 8/29.  -  WBAT RLE  - c/w pain meds  - physiatry and PT eval, probably 4A    # Acute kidney injury, prerenal  - resolved  # Hypokalemia - resolved    # Reactive leucocytosis  # Anemia  - Start ferrous sulphate  - monitor cbc    # Hypotension-resolved  # Paroxysmal afib, now in SR  - hold amlodipine,  coreg  - not on AC  - monitor BP    # Hypothyroidism  - c/w  synthroid    # DVT prophylaxis    #  DNR/ DNI/ trial of NIV    # Pending: monitor cbc, monitor BP  # Discussed plan of care with patient  # Disposition: 4 A vs STR

## 2024-08-31 NOTE — PROGRESS NOTE ADULT - ASSESSMENT
89-year-old female with past medical history of hypertension, hypothyroidism, A-fib, not on blood thinners, coming from an long-term, presents to the ED after a mechanical fall with right-sided hip pain, admitted for  acute, comminuted displaced right femoral intertrochanteric fracture. s/p OR.     #Acute, comminuted displaced right femoral intertrochanteric fracture  - Patient with mechanical slip and fall   - CT demonstrating right sided fracture   - patient had operation by ortho 8/29 night  - Pain control with Morphine 2mg q6 hours prn  - tylenol 975 q8  - Pt/ot  - Hb - 8/2 9/31-> monitor    #KASEY   - resolved  - Cr 0.9 8/31  - monitor BMP    #HTN  - holding home meds amlodipine and coreg    #hypothyroidism   - synthroid      #Misc  - DVT Prophylaxis: Lovenox   - Diet: DASH Diet   - Activity: IAT  - Code Status: DNR/DNI  - bowel regimen - miralax, senna

## 2024-08-31 NOTE — CONSULT NOTE ADULT - ASSESSMENT
IMPRESSION: Rehabilitation for right hip intertrochanteric fracture/IM nail    PRECAUTIONS: [  ] Cardiac  [  ] Respiratory  [  ] Seizures [  ] Contact Precautions  [  ] Droplet Isolation  [  ] Other:      Weight Bearing Status:  WBAT RLE    RECOMMENDATION:    Out of bed to chair     DVT/decubitus ulcer prophylaxis    REHABILITATION PLAN:     [  X ] Bedside PT 3-5 times a week   [ X  ]   Bedside OT  2-3 times a week             [   ] No Rehab Therapy Indicated                   [   ]  Speech Therapy   Conditioning/ROM                                    ADL  Bed Mobility                                               Conditioning/ROM  Transfers                                                     Bed Mobility  Sitting /Standing Balance                         Transfers                                        Gait Training                                               Sitting/Standing Balance  Stair Training  [   ] Applicable                    Home Equipment Evaluation                                                                        Splinting  [   ] Only      GOALS:   ADL   [  X ]   Independent                    Transfers  [ x  ] Independent                          Ambulation  [  X ] Independent     [  x ] With device                            [   ]  CG                                                         [   ]  CG                                                                  [   ] CG                            [    ] Min A                                                   [   ] Min A                                                              [   ] Min  A          DISCHARGE PLAN:  [    ]  Good candidate for Intensive Rehabilitation/Hospital-based 4A [facility]                                             Will tolerate 3 hours of Intensive Rehab Daily                                       [    ]  Short Term Rehabilitation in Skilled Nursing Facility                                       [    ]  Home with Outpatient or  services                                         [  X  ]  Possible Candidate for Intensive Hospital-Based Rehabilitation      Thank you.

## 2024-08-31 NOTE — PROGRESS NOTE ADULT - SUBJECTIVE AND OBJECTIVE BOX
ORTHOPEDIC PROGRESS NOTE     Seen and examined at bedside. Doing well, pain controlled. Denies fevers, numbness/tingling. No other complaints.      Physical Exam:    RLE:  Dressing c/d/i  SILT s/s/sp/dp/t  Motor intact TA/EHL/GS  Digits wwp      A/P: 89yF s/p right hip ORIF with IMN on 8/29. Doing well.    - Activity: WBAT RLE  - Abx: Ancef 2g q8hr x3 doses for a total of 24hrs post-operatively  - DVT PP may be resumed today  - Pain control as needed  - IS encouraged  - AM labs  - PT/Rehab  - D/C planning    - If discharged, patient should follow up with Dr. Monreal at [address] [facility] phone [phone].  - Patient should be discharged on 6 weeks (from surgery date) of Aspirin 325mg daily for DVT prophylaxis as their mobility/function/ambulation will be decreased due to lower extremity orthopaedic surgery.

## 2024-09-01 LAB
ALBUMIN SERPL ELPH-MCNC: 3 G/DL — LOW (ref 3.5–5.2)
ALP SERPL-CCNC: 82 U/L — SIGNIFICANT CHANGE UP (ref 30–115)
ALT FLD-CCNC: 6 U/L — SIGNIFICANT CHANGE UP (ref 0–41)
ANION GAP SERPL CALC-SCNC: 9 MMOL/L — SIGNIFICANT CHANGE UP (ref 7–14)
AST SERPL-CCNC: 17 U/L — SIGNIFICANT CHANGE UP (ref 0–41)
BASOPHILS # BLD AUTO: 0.04 K/UL — SIGNIFICANT CHANGE UP (ref 0–0.2)
BASOPHILS NFR BLD AUTO: 0.3 % — SIGNIFICANT CHANGE UP (ref 0–1)
BILIRUB SERPL-MCNC: 1 MG/DL — SIGNIFICANT CHANGE UP (ref 0.2–1.2)
BUN SERPL-MCNC: 12 MG/DL — SIGNIFICANT CHANGE UP (ref 10–20)
CALCIUM SERPL-MCNC: 7.6 MG/DL — LOW (ref 8.4–10.5)
CHLORIDE SERPL-SCNC: 104 MMOL/L — SIGNIFICANT CHANGE UP (ref 98–110)
CO2 SERPL-SCNC: 21 MMOL/L — SIGNIFICANT CHANGE UP (ref 17–32)
CREAT SERPL-MCNC: 0.6 MG/DL — LOW (ref 0.7–1.5)
EGFR: 86 ML/MIN/1.73M2 — SIGNIFICANT CHANGE UP
EOSINOPHIL # BLD AUTO: 0.16 K/UL — SIGNIFICANT CHANGE UP (ref 0–0.7)
EOSINOPHIL NFR BLD AUTO: 1.3 % — SIGNIFICANT CHANGE UP (ref 0–8)
GLUCOSE SERPL-MCNC: 98 MG/DL — SIGNIFICANT CHANGE UP (ref 70–99)
HCT VFR BLD CALC: 24.5 % — LOW (ref 37–47)
HGB BLD-MCNC: 7.9 G/DL — LOW (ref 12–16)
IMM GRANULOCYTES NFR BLD AUTO: 0.5 % — HIGH (ref 0.1–0.3)
LYMPHOCYTES # BLD AUTO: 1.2 K/UL — SIGNIFICANT CHANGE UP (ref 1.2–3.4)
LYMPHOCYTES # BLD AUTO: 9.7 % — LOW (ref 20.5–51.1)
MAGNESIUM SERPL-MCNC: 2 MG/DL — SIGNIFICANT CHANGE UP (ref 1.8–2.4)
MCHC RBC-ENTMCNC: 30.3 PG — SIGNIFICANT CHANGE UP (ref 27–31)
MCHC RBC-ENTMCNC: 32.2 G/DL — SIGNIFICANT CHANGE UP (ref 32–37)
MCV RBC AUTO: 93.9 FL — SIGNIFICANT CHANGE UP (ref 81–99)
MONOCYTES # BLD AUTO: 1.72 K/UL — HIGH (ref 0.1–0.6)
MONOCYTES NFR BLD AUTO: 13.9 % — HIGH (ref 1.7–9.3)
NEUTROPHILS # BLD AUTO: 9.2 K/UL — HIGH (ref 1.4–6.5)
NEUTROPHILS NFR BLD AUTO: 74.3 % — SIGNIFICANT CHANGE UP (ref 42.2–75.2)
NRBC # BLD: 0 /100 WBCS — SIGNIFICANT CHANGE UP (ref 0–0)
PHOSPHATE SERPL-MCNC: 2.5 MG/DL — SIGNIFICANT CHANGE UP (ref 2.1–4.9)
PLATELET # BLD AUTO: 135 K/UL — SIGNIFICANT CHANGE UP (ref 130–400)
PMV BLD: 12.3 FL — HIGH (ref 7.4–10.4)
POTASSIUM SERPL-MCNC: 3.5 MMOL/L — SIGNIFICANT CHANGE UP (ref 3.5–5)
POTASSIUM SERPL-SCNC: 3.5 MMOL/L — SIGNIFICANT CHANGE UP (ref 3.5–5)
PROT SERPL-MCNC: 4.9 G/DL — LOW (ref 6–8)
RBC # BLD: 2.61 M/UL — LOW (ref 4.2–5.4)
RBC # FLD: 15.2 % — HIGH (ref 11.5–14.5)
SODIUM SERPL-SCNC: 134 MMOL/L — LOW (ref 135–146)
WBC # BLD: 12.38 K/UL — HIGH (ref 4.8–10.8)
WBC # FLD AUTO: 12.38 K/UL — HIGH (ref 4.8–10.8)

## 2024-09-01 PROCEDURE — 99232 SBSQ HOSP IP/OBS MODERATE 35: CPT

## 2024-09-01 RX ADMIN — Medication 325 MILLIGRAM(S): at 12:09

## 2024-09-01 RX ADMIN — ENOXAPARIN SODIUM 40 MILLIGRAM(S): 100 INJECTION SUBCUTANEOUS at 12:09

## 2024-09-01 RX ADMIN — POLYETHYLENE GLYCOL 3350 17 GRAM(S): 17 POWDER, FOR SOLUTION ORAL at 05:24

## 2024-09-01 NOTE — PROGRESS NOTE ADULT - ASSESSMENT
89-year-old female with past medical history of hypertension, hypothyroidism, A-fib, not on blood thinners, coming from an [facility], presents to the ED after a mechanical fall with right-sided hip pain.  Patient states that she is slipped on her tile floor and fell onto her right side. After falling she was unable to get up. Remembers before and after the event.      # Acute, comminuted displaced right femoral intertrochanteric fracture sec to mechanical fall  - CT Abdomen and Pelvis w/ IV Cont (08.29.24 @ 03:16) >Acute, comminuted displaced right femoral intertrochanteric fracture  - s/p right hip ORIF with IMN on 8/29.  -  WBAT RLE  - c/w pain meds  - physiatry and PT eval, probably 4A    # Acute kidney injury, prerenal  - resolved  # Hypokalemia - resolved    # Reactive leucocytosis  # Anemia  - c/w  ferrous sulphate  - monitor cbc    # Hypotension-resolved  # Paroxysmal afib, now in SR  - hold amlodipine,  coreg  - not on AC  - monitor BP    # Hypothyroidism  - c/w  synthroid    # DVT prophylaxis    #  DNR/ DNI/ trial of NIV    # Pending: monitor cbc, monitor BP  # Discussed plan of care with patient  # Disposition: 4 A vs STR

## 2024-09-01 NOTE — PROGRESS NOTE ADULT - SUBJECTIVE AND OBJECTIVE BOX
ORTHOPAEDIC SURGERY PROGRESS NOTE    Interval History:  Patient seen and examined at bedside.  Pain is controlled.  No complaints of chest pain, SOB, N/V.    MEDICATIONS  (STANDING):  enoxaparin Injectable 40 milliGRAM(s) SubCutaneous every 24 hours  ferrous    sulfate 325 milliGRAM(s) Oral daily  polyethylene glycol 3350 17 Gram(s) Oral every 12 hours  senna 2 Tablet(s) Oral at bedtime    MEDICATIONS  (PRN):  acetaminophen     Tablet .. 975 milliGRAM(s) Oral every 8 hours PRN Mild Pain (1 - 3)  morphine  - Injectable 2 milliGRAM(s) IV Push every 6 hours PRN Severe Pain (7 - 10)      Vital Signs Last 24 Hrs  T(C): 37.1 (31 Aug 2024 16:52), Max: 37.1 (31 Aug 2024 16:52)  T(F): 98.8 (31 Aug 2024 16:52), Max: 98.8 (31 Aug 2024 16:52)  HR: 74 (31 Aug 2024 16:52) (74 - 82)  BP: 121/64 (31 Aug 2024 16:52) (102/60 - 121/64)  BP(mean): --  RR: 18 (31 Aug 2024 16:52) (18 - 18)  SpO2: 93% (31 Aug 2024 16:52) (93% - 95%)    Physical Exam:   Dressing c/d/i  SILT s/s/sp/dp/t  Motor intact TA/EHL/GS  Digits wwp                           8.4    14.95 )-----------( 131      ( 31 Aug 2024 17:22 )             25.8     08-31    134<L>  |  103  |  14  ----------------------------<  97  3.7   |  19  |  0.7    Ca    7.7<L>      31 Aug 2024 06:31  Phos  2.5     08-31  Mg     2.1     08-31    TPro  5.1<L>  /  Alb  3.1<L>  /  TBili  0.8  /  DBili  x   /  AST  20  /  ALT  8   /  AlkPhos  85  08-31      Urinalysis Basic - ( 31 Aug 2024 06:31 )    Color: x / Appearance: x / SG: x / pH: x  Gluc: 97 mg/dL / Ketone: x  / Bili: x / Urobili: x   Blood: x / Protein: x / Nitrite: x   Leuk Esterase: x / RBC: x / WBC x   Sq Epi: x / Non Sq Epi: x / Bacteria: x        08-30-24 @ 07:01  -  08-31-24 @ 07:00  --------------------------------------------------------  IN: 180 mL / OUT: 450 mL / NET: -270 mL    08-31-24 @ 07:01  -  09-01-24 @ 06:47  --------------------------------------------------------  IN: 0 mL / OUT: 600 mL / NET: -600 mL        A/P: 89yF s/p right hip ORIF with IMN on 8/29. Doing well.    - Activity: WBAT RLE  - Abx: Ancef 2g q8hr x3 doses for a total of 24hrs post-operatively - completed  - DVT PPx: LVX  - Pain control as needed  - IS encouraged  - AM labs  - PT/Rehab  - D/C planning    - If discharged, patient should follow up with Dr. Monreal at [address] [facility] phone [phone].  - Patient should be discharged on 6 weeks (from surgery date) of Aspirin 81mg BID for DVT prophylaxis as their mobility/function/ambulation will be decreased due to lower extremity orthopaedic surgery.

## 2024-09-01 NOTE — PROGRESS NOTE ADULT - SUBJECTIVE AND OBJECTIVE BOX
Patient is a 89y old  Female who presents with a chief complaint of Hip fx (30 Aug 2024 11:39)    Patient was seen and examined.  no new complaints    PAST MEDICAL & SURGICAL HISTORY:  Hypothyroidism  HTN (hypertension)  Glaucoma  History of cataract surgery, right    Allergies  Flagyl (Other)    Intolerances  Augmentin (Diarrhea)  NSAIDs (Other)  dairy products (Diarrhea)  Cipro (Diarrhea)    MEDICATIONS  (STANDING):  enoxaparin Injectable 40 milliGRAM(s) SubCutaneous every 24 hours  ferrous    sulfate 325 milliGRAM(s) Oral daily  polyethylene glycol 3350 17 Gram(s) Oral every 12 hours  senna 2 Tablet(s) Oral at bedtime    MEDICATIONS  (PRN):  acetaminophen     Tablet .. 975 milliGRAM(s) Oral every 8 hours PRN Mild Pain (1 - 3)  morphine  - Injectable 2 milliGRAM(s) IV Push every 6 hours PRN Severe Pain (7 - 10)    T(C): 36.5 (09-01-24 @ 08:11), Max: 37.1 (08-31-24 @ 16:52)  HR: 77 (09-01-24 @ 08:11) (74 - 77)  BP: 114/54 (09-01-24 @ 08:11) (114/54 - 121/64)  RR: 18 (09-01-24 @ 08:11) (18 - 18)  SpO2: 98% (09-01-24 @ 08:11) (93% - 98%)    O/E:  Awake, alert, not in distress.  HEENT: atraumatic, EOMI.  Chest: decreased breath sounds at bases  CVS: SIS2 +, no murmur.  P/A: Soft, BS+  CNS: awake , alert  Ext: no edema feet. right hip dressing+  All systems reviewed positive findings as above.                                   7.9<L>  12.38<H> )-----------( 135      ( 01 Sep 2024 06:01 )             24.5<L>                        8.4<L>  14.95<H> )-----------( 131      ( 31 Aug 2024 17:22 )             25.8<L>  09-01    134<L>  |  104  |  12  ----------------------------<  98  3.5   |  21  |  0.6<L>  08-31    134<L>  |  103  |  14  ----------------------------<  97  3.7   |  19  |  0.7    Ca    7.6<L>      01 Sep 2024 06:01  Ca    7.7<L>      31 Aug 2024 06:31  Ca    7.8<L>      30 Aug 2024 16:58  Phos  2.5     09-01  Mg     2.0     09-01    TPro  4.9<L>  /  Alb  3.0<L>  /  TBili  1.0  /  DBili  x   /  AST  17  /  ALT  6   /  AlkPhos  82  09-01  TPro  5.1<L>  /  Alb  3.1<L>  /  TBili  0.8  /  DBili  x   /  AST  20  /  ALT  8   /  AlkPhos  85  08-31

## 2024-09-02 ENCOUNTER — TRANSCRIPTION ENCOUNTER (OUTPATIENT)
Age: 89
End: 2024-09-02

## 2024-09-02 LAB
ALBUMIN SERPL ELPH-MCNC: 3.1 G/DL — LOW (ref 3.5–5.2)
ALP SERPL-CCNC: 79 U/L — SIGNIFICANT CHANGE UP (ref 30–115)
ALT FLD-CCNC: 6 U/L — SIGNIFICANT CHANGE UP (ref 0–41)
ANION GAP SERPL CALC-SCNC: 10 MMOL/L — SIGNIFICANT CHANGE UP (ref 7–14)
AST SERPL-CCNC: 15 U/L — SIGNIFICANT CHANGE UP (ref 0–41)
BASOPHILS # BLD AUTO: 0.06 K/UL — SIGNIFICANT CHANGE UP (ref 0–0.2)
BASOPHILS NFR BLD AUTO: 0.6 % — SIGNIFICANT CHANGE UP (ref 0–1)
BILIRUB SERPL-MCNC: 1.2 MG/DL — SIGNIFICANT CHANGE UP (ref 0.2–1.2)
BUN SERPL-MCNC: 11 MG/DL — SIGNIFICANT CHANGE UP (ref 10–20)
CALCIUM SERPL-MCNC: 7.9 MG/DL — LOW (ref 8.4–10.5)
CHLORIDE SERPL-SCNC: 105 MMOL/L — SIGNIFICANT CHANGE UP (ref 98–110)
CO2 SERPL-SCNC: 24 MMOL/L — SIGNIFICANT CHANGE UP (ref 17–32)
CREAT SERPL-MCNC: 0.5 MG/DL — LOW (ref 0.7–1.5)
EGFR: 90 ML/MIN/1.73M2 — SIGNIFICANT CHANGE UP
EOSINOPHIL # BLD AUTO: 0.19 K/UL — SIGNIFICANT CHANGE UP (ref 0–0.7)
EOSINOPHIL NFR BLD AUTO: 1.8 % — SIGNIFICANT CHANGE UP (ref 0–8)
GLUCOSE SERPL-MCNC: 95 MG/DL — SIGNIFICANT CHANGE UP (ref 70–99)
HCT VFR BLD CALC: 24.7 % — LOW (ref 37–47)
HGB BLD-MCNC: 8 G/DL — LOW (ref 12–16)
IMM GRANULOCYTES NFR BLD AUTO: 0.6 % — HIGH (ref 0.1–0.3)
LYMPHOCYTES # BLD AUTO: 1.52 K/UL — SIGNIFICANT CHANGE UP (ref 1.2–3.4)
LYMPHOCYTES # BLD AUTO: 14.3 % — LOW (ref 20.5–51.1)
MAGNESIUM SERPL-MCNC: 2 MG/DL — SIGNIFICANT CHANGE UP (ref 1.8–2.4)
MCHC RBC-ENTMCNC: 30.7 PG — SIGNIFICANT CHANGE UP (ref 27–31)
MCHC RBC-ENTMCNC: 32.4 G/DL — SIGNIFICANT CHANGE UP (ref 32–37)
MCV RBC AUTO: 94.6 FL — SIGNIFICANT CHANGE UP (ref 81–99)
MONOCYTES # BLD AUTO: 1.4 K/UL — HIGH (ref 0.1–0.6)
MONOCYTES NFR BLD AUTO: 13.2 % — HIGH (ref 1.7–9.3)
NEUTROPHILS # BLD AUTO: 7.4 K/UL — HIGH (ref 1.4–6.5)
NEUTROPHILS NFR BLD AUTO: 69.5 % — SIGNIFICANT CHANGE UP (ref 42.2–75.2)
NRBC # BLD: 0 /100 WBCS — SIGNIFICANT CHANGE UP (ref 0–0)
PHOSPHATE SERPL-MCNC: 2.7 MG/DL — SIGNIFICANT CHANGE UP (ref 2.1–4.9)
PLATELET # BLD AUTO: 176 K/UL — SIGNIFICANT CHANGE UP (ref 130–400)
PMV BLD: 11.7 FL — HIGH (ref 7.4–10.4)
POTASSIUM SERPL-MCNC: 3.7 MMOL/L — SIGNIFICANT CHANGE UP (ref 3.5–5)
POTASSIUM SERPL-SCNC: 3.7 MMOL/L — SIGNIFICANT CHANGE UP (ref 3.5–5)
PROT SERPL-MCNC: 5 G/DL — LOW (ref 6–8)
RBC # BLD: 2.61 M/UL — LOW (ref 4.2–5.4)
RBC # FLD: 15 % — HIGH (ref 11.5–14.5)
SODIUM SERPL-SCNC: 139 MMOL/L — SIGNIFICANT CHANGE UP (ref 135–146)
WBC # BLD: 10.63 K/UL — SIGNIFICANT CHANGE UP (ref 4.8–10.8)
WBC # FLD AUTO: 10.63 K/UL — SIGNIFICANT CHANGE UP (ref 4.8–10.8)

## 2024-09-02 PROCEDURE — 99232 SBSQ HOSP IP/OBS MODERATE 35: CPT

## 2024-09-02 RX ORDER — LEVOTHYROXINE SODIUM 100 MCG
88 TABLET ORAL DAILY
Refills: 0 | Status: DISCONTINUED | OUTPATIENT
Start: 2024-09-02 | End: 2024-09-04

## 2024-09-02 RX ORDER — ENOXAPARIN SODIUM 100 MG/ML
40 INJECTION SUBCUTANEOUS
Qty: 0 | Refills: 0 | DISCHARGE
Start: 2024-09-02

## 2024-09-02 RX ORDER — METOPROLOL TARTRATE 100 MG/1
25 TABLET ORAL EVERY 12 HOURS
Refills: 0 | Status: DISCONTINUED | OUTPATIENT
Start: 2024-09-02 | End: 2024-09-04

## 2024-09-02 RX ORDER — FERROUS SULFATE 325(65) MG
1 TABLET ORAL
Qty: 0 | Refills: 0 | DISCHARGE
Start: 2024-09-02

## 2024-09-02 RX ORDER — ACETAMINOPHEN 325 MG/1
3 TABLET ORAL
Qty: 0 | Refills: 0 | DISCHARGE
Start: 2024-09-02

## 2024-09-02 RX ORDER — AMLODIPINE BESYLATE 10 MG/1
1 TABLET ORAL
Refills: 0 | DISCHARGE

## 2024-09-02 RX ORDER — SENNA 187 MG
2 TABLET ORAL
Qty: 0 | Refills: 0 | DISCHARGE
Start: 2024-09-02

## 2024-09-02 RX ORDER — POLYETHYLENE GLYCOL 3350 17 G/17G
17 POWDER, FOR SOLUTION ORAL
Qty: 0 | Refills: 0 | DISCHARGE
Start: 2024-09-02

## 2024-09-02 RX ADMIN — ENOXAPARIN SODIUM 40 MILLIGRAM(S): 100 INJECTION SUBCUTANEOUS at 11:40

## 2024-09-02 RX ADMIN — Medication 325 MILLIGRAM(S): at 11:40

## 2024-09-02 RX ADMIN — METOPROLOL TARTRATE 25 MILLIGRAM(S): 100 TABLET ORAL at 17:47

## 2024-09-02 RX ADMIN — Medication 88 MICROGRAM(S): at 17:46

## 2024-09-02 RX ADMIN — POLYETHYLENE GLYCOL 3350 17 GRAM(S): 17 POWDER, FOR SOLUTION ORAL at 17:47

## 2024-09-02 NOTE — PROGRESS NOTE ADULT - SUBJECTIVE AND OBJECTIVE BOX
ROSE HINES  89y  Female      HOSPITAL COURSE  89-year-old female with past medical history of hypertension, hypothyroidism, A-fib, not on blood thinners, coming from an [facility], presents to the ED after a mechanical fall with right-sided hip pain.  Patient states that she is slipped on her tile floor and fell onto her right side. After falling she was unable to get up. Remembers before and after the event.  In the ED BP was 180/83. , Afebrile. Labs significant for a wbc count of 16.51, Cr 1.1 (Baseline 0.7), BUN 27, , ALT 54, and Lipase 68. CT abdomen and pelvis showed acute, comminuted displaced right femoral intertrochanteric fracture. Went to OR to have fixed by ortho. s/p fixation.       INTERVAL HPI/OVERNIGHT EVENTS:  No complaints overnight, patient reports sleeping grossly well. Pain is well controlled.    REVIEW OF SYSTEMS:  CONSTITUTIONAL: No fever, weight loss, or fatigue  EYES: No eye pain, visual disturbances, or discharge  ENMT:  No difficulty hearing, tinnitus, vertigo; No sinus or throat pain  NECK: No pain or stiffness  RESPIRATORY: No cough, wheezing, chills or hemoptysis; No shortness of breath  CARDIOVASCULAR: No chest pain, palpitations, dizziness, or leg swelling  GASTROINTESTINAL: No abdominal or epigastric pain. No diarrhea or constipation. No nausea, vomiting, or hematemesis. No melena or hematochezia.  GENITOURINARY: No dysuria, frequency, hematuria, or incontinence  NEUROLOGICAL: No headaches, memory loss, loss of strength, numbness, or tremors  MUSCULOSKELETAL: No joint pain or swelling; No muscle, back, or extremity pain  SKIN: No itching, burning, rashes, or lesions   LYMPH NODES: No enlarged glands  ENDOCRINE: No heat or cold intolerance; No hair loss  PSYCHIATRIC: No depression, anxiety, mood swings, or difficulty sleeping  HEME/LYMPH: No easy bruising, or bleeding gums  ALLERY AND IMMUNOLOGIC: No hives or eczema    Vital Signs Last 24 Hrs  T(C): 37 (02 Sep 2024 15:10), Max: 37 (02 Sep 2024 15:10)  T(F): 98.6 (02 Sep 2024 15:10), Max: 98.6 (02 Sep 2024 15:10)  HR: 81 (02 Sep 2024 15:10) (81 - 106)  BP: 124/63 (02 Sep 2024 15:10) (124/63 - 129/53)  BP(mean): --  RR: 18 (02 Sep 2024 15:10) (17 - 18)  SpO2: 97% (02 Sep 2024 15:10) (97% - 97%)    Parameters below as of 02 Sep 2024 15:10  Patient On (Oxygen Delivery Method): room air        PHYSICAL EXAM:  GENERAL: NAD, well-groomed, well-developed  HEAD:  Atraumatic, Normocephalic  EYES: EOMI, PERRLA, conjunctiva and sclera clear  ENMT: Moist mucous membranes, Good dentition, No lesions  NECK: Supple, No JVD, Normal thyroid  NERVOUS SYSTEM:  Alert & Oriented X3, Good concentration; Motor Strength 5/5 B/L upper and lower extremities; DTRs 2+ intact and symmetric  CHEST/LUNG: Clear to auscultation bilaterally; No rales, rhonchi, wheezing, or rubs  HEART: Regular rate and rhythm; No murmurs, rubs, or gallops  ABDOMEN: Soft, Nontender, Nondistended; Bowel sounds present  EXTREMITIES:  2+ Peripheral Pulses, No clubbing, cyanosis, or edema  LYMPH: No lymphadenopathy noted  SKIN: No rashes or lesions    Consultant(s) Notes Reviewed:  [x ] YES  [ ] NO  Care Discussed with Consultants/Other Providers [ x] YES  [ ] NO    LABS:                        8.0    10.63 )-----------( 176      ( 02 Sep 2024 06:08 )             24.7     09-02    139  |  105  |  11  ----------------------------<  95  3.7   |  24  |  0.5<L>    Ca    7.9<L>      02 Sep 2024 06:08  Phos  2.7     09-02  Mg     2.0     09-02    TPro  5.0<L>  /  Alb  3.1<L>  /  TBili  1.2  /  DBili  x   /  AST  15  /  ALT  6   /  AlkPhos  79  09-02      Urinalysis Basic - ( 02 Sep 2024 06:08 )    Color: x / Appearance: x / SG: x / pH: x  Gluc: 95 mg/dL / Ketone: x  / Bili: x / Urobili: x   Blood: x / Protein: x / Nitrite: x   Leuk Esterase: x / RBC: x / WBC x   Sq Epi: x / Non Sq Epi: x / Bacteria: x        CAPILLARY BLOOD GLUCOSE          RADIOLOGY & ADDITIONAL TESTS:    Imaging Personally Reviewed:  [ ] YES  [ ] NO

## 2024-09-02 NOTE — DISCHARGE NOTE PROVIDER - NSDCMRMEDTOKEN_GEN_ALL_CORE_FT
acetaminophen 325 mg oral tablet: 3 tab(s) orally every 8 hours As needed Mild Pain (1 - 3)  carvedilol 12.5 mg oral tablet: 1 tab(s) orally 2 times a day  enoxaparin: 40 milligram(s) injectable once a day  ferrous sulfate 325 mg (65 mg elemental iron) oral tablet: 1 tab(s) orally once a day  LATANOPROST 0.005% EYE DROPS: 1 applicator to each affected eye once a day (at bedtime) INSTILL 1 DROP INTO AFFECTED EYE(S) ONCE DAILY IN THE EVENING  LEVOTHYROXINE 88 MCG TABLET: 1 tab(s) orally once a day TAKE 1 TABLET BY MOUTH EVERY DAY  morphine: 2 milligram(s) intravenous every 6 hours as needed for  severe pain  omeprazole 20 mg oral delayed release capsule: 1 cap(s) orally once a day  polyethylene glycol 3350 oral powder for reconstitution: 17 gram(s) orally every 12 hours  senna leaf extract oral tablet: 2 tab(s) orally once a day (at bedtime)   acetaminophen 325 mg oral tablet: 3 tab(s) orally every 8 hours As needed Mild Pain (1 - 3)  enoxaparin: 40 milligram(s) injectable once a day  ferrous sulfate 325 mg (65 mg elemental iron) oral tablet: 1 tab(s) orally once a day  LATANOPROST 0.005% EYE DROPS: 1 applicator to each affected eye once a day (at bedtime) INSTILL 1 DROP INTO AFFECTED EYE(S) ONCE DAILY IN THE EVENING  LEVOTHYROXINE 88 MCG TABLET: 1 tab(s) orally once a day TAKE 1 TABLET BY MOUTH EVERY DAY  morphine: 2 milligram(s) intravenous every 6 hours as needed for  severe pain  omeprazole 20 mg oral delayed release capsule: 1 cap(s) orally once a day  polyethylene glycol 3350 oral powder for reconstitution: 17 gram(s) orally every 12 hours  senna leaf extract oral tablet: 2 tab(s) orally once a day (at bedtime)   acetaminophen 325 mg oral tablet: 3 tab(s) orally every 8 hours As needed Mild Pain (1 - 3)  enoxaparin: 40 milligram(s) injectable once a day  ferrous sulfate 325 mg (65 mg elemental iron) oral tablet: 1 tab(s) orally once a day  LATANOPROST 0.005% EYE DROPS: 1 applicator to each affected eye once a day (at bedtime) INSTILL 1 DROP INTO AFFECTED EYE(S) ONCE DAILY IN THE EVENING  LEVOTHYROXINE 88 MCG TABLET: 1 tab(s) orally once a day TAKE 1 TABLET BY MOUTH EVERY DAY  metoprolol tartrate 25 mg oral tablet: 1 tab(s) orally every 12 hours  omeprazole 20 mg oral delayed release capsule: 1 cap(s) orally once a day  oxyCODONE 5 mg oral tablet: 1 tab(s) orally 4 times a day as needed for Severe Pain (7 - 10)  polyethylene glycol 3350 oral powder for reconstitution: 17 gram(s) orally every 12 hours  senna leaf extract oral tablet: 2 tab(s) orally once a day (at bedtime)

## 2024-09-02 NOTE — PROGRESS NOTE ADULT - SUBJECTIVE AND OBJECTIVE BOX
ORTHOPAEDIC SURGERY PROGRESS NOTE    Interval History:  Patient seen and examined at bedside.  Pain is controlled.  No complaints of chest pain, SOB, N/V.    MEDICATIONS  (STANDING):  enoxaparin Injectable 40 milliGRAM(s) SubCutaneous every 24 hours  ferrous    sulfate 325 milliGRAM(s) Oral daily  polyethylene glycol 3350 17 Gram(s) Oral every 12 hours  senna 2 Tablet(s) Oral at bedtime    MEDICATIONS  (PRN):  acetaminophen     Tablet .. 975 milliGRAM(s) Oral every 8 hours PRN Mild Pain (1 - 3)  morphine  - Injectable 2 milliGRAM(s) IV Push every 6 hours PRN Severe Pain (7 - 10)      Vital Signs Last 24 Hrs  T(C): 37.1 (31 Aug 2024 16:52), Max: 37.1 (31 Aug 2024 16:52)  T(F): 98.8 (31 Aug 2024 16:52), Max: 98.8 (31 Aug 2024 16:52)  HR: 74 (31 Aug 2024 16:52) (74 - 82)  BP: 121/64 (31 Aug 2024 16:52) (102/60 - 121/64)  BP(mean): --  RR: 18 (31 Aug 2024 16:52) (18 - 18)  SpO2: 93% (31 Aug 2024 16:52) (93% - 95%)    Physical Exam:   Dressing c/d/i  SILT s/s/sp/dp/t  Motor intact TA/EHL/GS  Digits wwp                           8.4    14.95 )-----------( 131      ( 31 Aug 2024 17:22 )             25.8     08-31    134<L>  |  103  |  14  ----------------------------<  97  3.7   |  19  |  0.7    Ca    7.7<L>      31 Aug 2024 06:31  Phos  2.5     08-31  Mg     2.1     08-31    TPro  5.1<L>  /  Alb  3.1<L>  /  TBili  0.8  /  DBili  x   /  AST  20  /  ALT  8   /  AlkPhos  85  08-31      Urinalysis Basic - ( 31 Aug 2024 06:31 )    Color: x / Appearance: x / SG: x / pH: x  Gluc: 97 mg/dL / Ketone: x  / Bili: x / Urobili: x   Blood: x / Protein: x / Nitrite: x   Leuk Esterase: x / RBC: x / WBC x   Sq Epi: x / Non Sq Epi: x / Bacteria: x        08-30-24 @ 07:01  -  08-31-24 @ 07:00  --------------------------------------------------------  IN: 180 mL / OUT: 450 mL / NET: -270 mL    08-31-24 @ 07:01  -  09-01-24 @ 06:47  --------------------------------------------------------  IN: 0 mL / OUT: 600 mL / NET: -600 mL        A/P: 89yF s/p right hip ORIF with IMN on 8/29. Doing well.    - Activity: WBAT RLE  - Abx: Ancef 2g q8hr x3 doses for a total of 24hrs post-operatively - completed  - DVT PPx: LVX  - Pain control as needed  - IS encouraged  - AM labs  - PT/Rehab  - D/C planning    Hgb dropped postop but has been stable since surgery. No orthopaedic contraindication to discharge.    - If discharged, patient should follow up with Dr. Monreal at [address] [place] [address]., phone [phone].  - Patient should be discharged on 6 weeks (from surgery date) of Aspirin 81mg BID for DVT prophylaxis as their mobility/function/ambulation will be decreased due to lower extremity orthopaedic surgery.

## 2024-09-02 NOTE — DISCHARGE NOTE PROVIDER - NSDCCPCAREPLAN_GEN_ALL_CORE_FT
PRINCIPAL DISCHARGE DIAGNOSIS  Diagnosis: Hip fracture  Assessment and Plan of Treatment: You were found to have a hip fracture sustained from your fall.   Fall prevention includes ways to make your home and other areas safer. It also includes ways you can move more carefully to prevent a fall. Health conditions that cause changes in your blood pressure, vision, or muscle strength and coordination may increase your risk for falls. Medicines may also increase your risk for falls if they make you dizzy, weak, or sleepy.  SEEK IMMEDIATE MEDICAL ATTENTION IF: You have fallen and are unconscious, you have fallen and cannot move part of your body, or you have fallen and have pain or a headache.  FALL PREVENTION TIPS:  Stand or sit up slowly. Use assistive devices as directed. You may need to have grab bars put in your bathroom near the toilet or in the shower.   Wear shoes that fit well and have soles that . Wear shoes both inside and outside. Do not wear shoes with high heels.   Wear a personal alarm that can call 911 in an emergency.   Manage your medical conditions. Keep all appointments with your healthcare providers. Visit your eye doctor as directed.  HOME SAFETY TIPS:  Put nonslip strips on your bath or shower floor to prevent you from slipping. Use a shower seat so you do not need to stand while you shower. Sit on the toilet or a chair in your bathroom to dry yourself and put on clothing. This will prevent you from losing your balance from drying or dressing yourself while you are standing.   Keep paths clear. Remove books, shoes, and other objects from walkways and stairs. Place cords for telephones and lamps out of the way so that you do not need to walk over them. Tape them down if you cannot move them. Remove small rugs or secure it with double-sided tape to prevent you from tripping.  Install bright lights in your home. Use night lights to help light paths to the bathroom or kitchen. Always turn on the light before you start walking.   Keep items you use often on shelves within reach. Do not use a step stool to help you reach an item.   Place refle      SECONDARY DISCHARGE DIAGNOSES  Diagnosis: Fall  Assessment and Plan of Treatment:

## 2024-09-02 NOTE — PROGRESS NOTE ADULT - SUBJECTIVE AND OBJECTIVE BOX
Patient is a 89y old  Female who presents with a chief complaint of Hip fx (30 Aug 2024 11:39)    Patient was seen and examined.  no new complaints    PAST MEDICAL & SURGICAL HISTORY:  Hypothyroidism  HTN (hypertension)  Glaucoma  History of cataract surgery, right    Allergies  Flagyl (Other)    Intolerances  Augmentin (Diarrhea)  NSAIDs (Other)  dairy products (Diarrhea)  Cipro (Diarrhea)    MEDICATIONS  (STANDING):  enoxaparin Injectable 40 milliGRAM(s) SubCutaneous every 24 hours  ferrous    sulfate 325 milliGRAM(s) Oral daily  levothyroxine 88 MICROGram(s) Oral daily  polyethylene glycol 3350 17 Gram(s) Oral every 12 hours  senna 2 Tablet(s) Oral at bedtime    MEDICATIONS  (PRN):  acetaminophen     Tablet .. 975 milliGRAM(s) Oral every 8 hours PRN Mild Pain (1 - 3)  morphine  - Injectable 2 milliGRAM(s) IV Push every 6 hours PRN Severe Pain (7 - 10)    T(C): 36.1 (09-02-24 @ 07:56), Max: 36.9 (09-02-24 @ 00:00)  HR: 106 (09-02-24 @ 07:56) (67 - 106)  BP: 129/53 (09-02-24 @ 07:56) (124/61 - 129/53)  RR: 17 (09-02-24 @ 07:56) (17 - 18)  SpO2: 97% (09-02-24 @ 07:56) (97% - 97%)    O/E:  Awake, alert, not in distress.  HEENT: atraumatic, EOMI.  Chest: decreased breath sounds at bases  CVS: SIS2 +, no murmur.  P/A: Soft, BS+  CNS: awake , alert  Ext: no edema feet. right hip dressing+  All systems reviewed positive findings as above.                               8.0<L>  10.63 )-----------( 176      ( 02 Sep 2024 06:08 )             24.7<L>                        7.9<L>  12.38<H> )-----------( 135      ( 01 Sep 2024 06:01 )             24.5<L>  09-02    139  |  105  |  11  ----------------------------<  95  3.7   |  24  |  0.5<L>  09-01    134<L>  |  104  |  12  ----------------------------<  98  3.5   |  21  |  0.6<L>    Ca    7.9<L>      02 Sep 2024 06:08  Ca    7.6<L>      01 Sep 2024 06:01  Phos  2.7     09-02  Mg     2.0     09-02    TPro  5.0<L>  /  Alb  3.1<L>  /  TBili  1.2  /  DBili  x   /  AST  15  /  ALT  6   /  AlkPhos  79  09-02  TPro  4.9<L>  /  Alb  3.0<L>  /  TBili  1.0  /  DBili  x   /  AST  17  /  ALT  6   /  AlkPhos  82  09-01

## 2024-09-02 NOTE — DISCHARGE NOTE PROVIDER - PROVIDER TOKENS
PROVIDER:[TOKEN:[[id number]],FOLLOWUP:[2 weeks]],PROVIDER:[TOKEN:[[id number]],FOLLOWUP:[2 weeks]] PROVIDER:[TOKEN:[[id number]],FOLLOWUP:[2 weeks]],PROVIDER:[TOKEN:[[id number]],FOLLOWUP:[2 weeks]],PROVIDER:[TOKEN:[[id number]],FOLLOWUP:[1 month]]

## 2024-09-02 NOTE — DISCHARGE NOTE PROVIDER - CARE PROVIDERS DIRECT ADDRESSES
,DirectAddress_Unknown,[email or web address] ,DirectAddress_Unknown,[email or web address],DirectAddress_Unknown

## 2024-09-02 NOTE — DISCHARGE NOTE PROVIDER - CARE PROVIDER_API CALL
Anatoliy Bartlett  Internal Medicine  [address] [place] [address]  Phone: [phone]  Fax: [phone]  Follow Up Time: 2 weeks    Nickie Monreal  Orthopaedic Surgery  [address] [place] [address]  Phone: [phone]  Fax: [phone]  Follow Up Time: 2 weeks   Anatoliy Bartlett  Internal Medicine  [address] [place] [address]  Phone: [phone]  Fax: [phone]  Follow Up Time: 2 weeks    Nickie Monreal  Orthopaedic Surgery  [address] [place] [address]  Phone: [phone]  Fax: [phone]  Follow Up Time: 2 weeks    Lucía Nagel  Cardiovascular Disease  [address] [address]  Phone: [phone]  Fax: [phone]  Follow Up Time: 1 month

## 2024-09-02 NOTE — PROGRESS NOTE ADULT - ASSESSMENT
· Assessment	  89-year-old female with past medical history of hypertension, hypothyroidism, A-fib, not on blood thinners, coming from an [facility], presents to the ED after a mechanical fall with right-sided hip pain, admitted for  acute, comminuted displaced right femoral intertrochanteric fracture. s/p OR.     #Acute, comminuted displaced right femoral intertrochanteric fracture  - Patient with mechanical slip and fall   - CT demonstrating right sided fracture   - patient had operation by ortho 8/29 night  - Pain control with Morphine 2mg q6 hours prn  - tylenol 975 q8  - Pt/ot  - Hbdrop from 11.9 to 8 today  -no changes at ORIF site suggestive of hematoma or bleeding  - Hgb dropped postop but has been stable since surgery. No orthopaedic contraindication to discharge.  - Patient should be discharged on 6 weeks (from surgery date) of Aspirin 81mg BID for DVT prophylaxis as their mobility/function/ambulation will be decreased due to lower extremity orthopaedic surgery.    #UA positive  -asymptomatic  -UA shows small LE    #KASEY   - resolved  - Cr 0.5 today  - monitor BMP    #HTN  - holding home meds amlodipine and coreg    #hypothyroidism   - synthroid      #Misc  - DVT Prophylaxis: Lovenox   - Diet: DASH Diet   - Activity: IAT  - Code Status: DNR/DNI  - bowel regimen - miralax, senna

## 2024-09-02 NOTE — DISCHARGE NOTE PROVIDER - HOSPITAL COURSE
Pt is an 89-year-old female with past medical history of hypertension, hypothyroidism, A-fib not on blood thinners, [facility], presents to the ED after a mechanical fall with right-sided hip pain.  Patient states that she is slipped on her tile floor and fell onto her right side. After falling she was unable to get up. Remembers before and after the event.     In the ED BP was 180/83. , Afebrile. Labs significant for a wbc count of 16.51, Cr 1.1 (Baseline 0.7), BUN 27, , ALT 54, and Lipase 68. CT abdomen and pelvis showed acute, comminuted displaced right femoral intertrochanteric fracture.    Hospital Course:  - patient was admitted to med/surg; underwent R hip ORIF with IMN on 8/29  - Hgb dropped from 12 and gradually to 8, where it remained stable with stable bp no signs of active bleeding  - Plan is to discharge patient to rehab    # Acute, comminuted displaced right femoral intertrochanteric fracture sec to mechanical fall  - CT Abdomen and Pelvis w/ IV Cont (08.29.24 @ 03:16) >Acute, comminuted displaced right femoral intertrochanteric fracture  - s/p right hip ORIF with IMN on 8/29.  -  WBAT RLE  - c/w pain meds  - physiatry and PT eval, probably 4A    # Acute kidney injury, prerenal  - resolved  # Hypokalemia - resolved    # Reactive leucocytosis  # Anemia  - c/w  ferrous sulphate  - monitor cbc    # Hypotension-resolved  # Paroxysmal afib, now in SR  - hold amlodipine  - restart coreg  - not on AC  - monitor BP    # Hypothyroidism  - c/w  synthroid    #  DNR/ DNI/ trial of NIV     Pt is an 89-year-old female with past medical history of hypertension, hypothyroidism, A-fib not on blood thinners, coming from an [facility], presents to the ED after a mechanical fall with right-sided hip pain.  Patient states that she is slipped on her tile floor and fell onto her right side. After falling she was unable to get up. Remembers before and after the event.     In the ED BP was 180/83. , Afebrile. Labs significant for a wbc count of 16.51, Cr 1.1 (Baseline 0.7), BUN 27, , ALT 54, and Lipase 68. CT abdomen and pelvis showed acute, comminuted displaced right femoral intertrochanteric fracture.    Hospital Course:  - patient was admitted to med/surg; underwent R hip ORIF with IMN on 8/29  - Hgb dropped from 12 and gradually to 8, where it remained stable with stable bp no signs of active bleeding  - Plan is to discharge patient to rehab    # Acute, comminuted displaced right femoral intertrochanteric fracture sec to mechanical fall  - CT Abdomen and Pelvis w/ IV Cont (08.29.24 @ 03:16) >Acute, comminuted displaced right femoral intertrochanteric fracture  - s/p right hip ORIF with IMN on 8/29.  -  WBAT RLE  - c/w pain meds  - physiatry and PT eval, probably 4A    # Acute kidney injury, prerenal  - resolved  # Hypokalemia - resolved    # Reactive leucocytosis  # Anemia  - c/w  ferrous sulphate  - monitor cbc    # Hypotension-resolved  # Paroxysmal afib, now in SR  - hold amlodipine and coreg; restart once blood pressure remains stable  - not on AC  - monitor BP    # Hypothyroidism  - c/w  synthroid    #  DNR/ DNI/ trial of NIV     Pt is an 89-year-old female with past medical history of hypertension, hypothyroidism, A-fib not on blood thinners, coming [facility] an [facility], presents to the ED after a mechanical fall with right-sided hip pain.  Patient states that she is slipped on her tile floor and fell onto her right side. After falling she was unable to get up. Remembers before and after the event.     In the ED BP was 180/83. , Afebrile. Labs significant for a wbc count of 16.51, Cr 1.1 (Baseline 0.7), BUN 27, , ALT 54, and Lipase 68. CT abdomen and pelvis showed acute, comminuted displaced right femoral intertrochanteric fracture.    Hospital Course:  - patient was admitted to med/surg; underwent R hip ORIF with IMN on 8/29  - Hgb dropped from 12 and gradually to 8, where it remained stable with stable bp no signs of active bleeding  - Plan is to discharge patient to rehab    # Acute, comminuted displaced right femoral intertrochanteric fracture sec to mechanical fall  - CT Abdomen and Pelvis w/ IV Cont (08.29.24 @ 03:16) >Acute, comminuted displaced right femoral intertrochanteric fracture  - s/p right hip ORIF with IMN on 8/29.  -  WBAT RLE  discharge plan to STR  follow up with orthopedics  cont lovenox for DVT px  pain control with PRN tylenol and oxycodone  bowel regime    # mild bilirubin elevation- indirect  possibly gilbert syndrome  patient has no abd pain or nausea or vomiting  no further evaluation needed now    # Acute kidney injury, prerenal  - resolved  # Hypokalemia - resolved    # Reactive leucocytosis  # Anemia- hg stable  - c/w  ferrous sulphate    # Hypotension-resolved  # Paroxysmal afib, now in SR  - hold amlodipine and coreg; restarted metoprolol - continue  - not on AC due to h/o GI bleed; follow up with cardiology    # Hypothyroidism  - c/w  synthroid    #  DNR/ DNI/ trial of NIV

## 2024-09-02 NOTE — PROGRESS NOTE ADULT - ASSESSMENT
89-year-old female with past medical history of hypertension, hypothyroidism, A-fib, not on blood thinners, coming from an [place], presents to the ED after a mechanical fall with right-sided hip pain.  Patient states that she is slipped on her tile floor and fell onto her right side. After falling she was unable to get up. Remembers before and after the event.      # Acute, comminuted displaced right femoral intertrochanteric fracture sec to mechanical fall  - CT Abdomen and Pelvis w/ IV Cont (08.29.24 @ 03:16) >Acute, comminuted displaced right femoral intertrochanteric fracture  - s/p right hip ORIF with IMN on 8/29.  -  WBAT RLE  - c/w pain meds  - physiatry and PT eval, probably 4A    # Acute kidney injury, prerenal  - resolved  # Hypokalemia - resolved    # Reactive leucocytosis-resolved  # Anemia  - c/w  ferrous sulphate  - monitor cbc    # Hypotension-resolved  # Paroxysmal afib, now in SR  - hold amlodipine,  coreg  - start metoprolol 25 mg q12  - not on AC  - monitor BP    # Hypothyroidism  - c/w  synthroid    # DVT prophylaxis    #  DNR/ DNI/ trial of NIV    # Pending: PT f/u   # Discussed plan of care with patient  # Disposition: 4 A vs STR

## 2024-09-03 LAB
ALBUMIN SERPL ELPH-MCNC: 2.8 G/DL — LOW (ref 3.5–5.2)
ALP SERPL-CCNC: 89 U/L — SIGNIFICANT CHANGE UP (ref 30–115)
ALT FLD-CCNC: 8 U/L — SIGNIFICANT CHANGE UP (ref 0–41)
ANION GAP SERPL CALC-SCNC: 10 MMOL/L — SIGNIFICANT CHANGE UP (ref 7–14)
AST SERPL-CCNC: 14 U/L — SIGNIFICANT CHANGE UP (ref 0–41)
BILIRUB SERPL-MCNC: 1.3 MG/DL — HIGH (ref 0.2–1.2)
BUN SERPL-MCNC: 10 MG/DL — SIGNIFICANT CHANGE UP (ref 10–20)
CALCIUM SERPL-MCNC: 7.8 MG/DL — LOW (ref 8.4–10.4)
CHLORIDE SERPL-SCNC: 103 MMOL/L — SIGNIFICANT CHANGE UP (ref 98–110)
CO2 SERPL-SCNC: 24 MMOL/L — SIGNIFICANT CHANGE UP (ref 17–32)
CREAT SERPL-MCNC: 0.5 MG/DL — LOW (ref 0.7–1.5)
EGFR: 90 ML/MIN/1.73M2 — SIGNIFICANT CHANGE UP
GLUCOSE SERPL-MCNC: 110 MG/DL — HIGH (ref 70–99)
HCT VFR BLD CALC: 26.9 % — LOW (ref 37–47)
HGB BLD-MCNC: 8.6 G/DL — LOW (ref 12–16)
MAGNESIUM SERPL-MCNC: 2 MG/DL — SIGNIFICANT CHANGE UP (ref 1.8–2.4)
MCHC RBC-ENTMCNC: 30.4 PG — SIGNIFICANT CHANGE UP (ref 27–31)
MCHC RBC-ENTMCNC: 32 G/DL — SIGNIFICANT CHANGE UP (ref 32–37)
MCV RBC AUTO: 95.1 FL — SIGNIFICANT CHANGE UP (ref 81–99)
NRBC # BLD: 0 /100 WBCS — SIGNIFICANT CHANGE UP (ref 0–0)
PLATELET # BLD AUTO: 227 K/UL — SIGNIFICANT CHANGE UP (ref 130–400)
PMV BLD: 11.5 FL — HIGH (ref 7.4–10.4)
POTASSIUM SERPL-MCNC: 3.7 MMOL/L — SIGNIFICANT CHANGE UP (ref 3.5–5)
POTASSIUM SERPL-SCNC: 3.7 MMOL/L — SIGNIFICANT CHANGE UP (ref 3.5–5)
PROT SERPL-MCNC: 5 G/DL — LOW (ref 6–8)
RBC # BLD: 2.83 M/UL — LOW (ref 4.2–5.4)
RBC # FLD: 15 % — HIGH (ref 11.5–14.5)
SODIUM SERPL-SCNC: 137 MMOL/L — SIGNIFICANT CHANGE UP (ref 135–146)
WBC # BLD: 11.32 K/UL — HIGH (ref 4.8–10.8)
WBC # FLD AUTO: 11.32 K/UL — HIGH (ref 4.8–10.8)

## 2024-09-03 PROCEDURE — 99232 SBSQ HOSP IP/OBS MODERATE 35: CPT

## 2024-09-03 RX ADMIN — METOPROLOL TARTRATE 25 MILLIGRAM(S): 100 TABLET ORAL at 05:00

## 2024-09-03 RX ADMIN — POLYETHYLENE GLYCOL 3350 17 GRAM(S): 17 POWDER, FOR SOLUTION ORAL at 18:05

## 2024-09-03 RX ADMIN — Medication 325 MILLIGRAM(S): at 12:10

## 2024-09-03 RX ADMIN — ENOXAPARIN SODIUM 40 MILLIGRAM(S): 100 INJECTION SUBCUTANEOUS at 12:10

## 2024-09-03 RX ADMIN — Medication 88 MICROGRAM(S): at 05:00

## 2024-09-03 RX ADMIN — METOPROLOL TARTRATE 25 MILLIGRAM(S): 100 TABLET ORAL at 18:04

## 2024-09-03 NOTE — PROGRESS NOTE ADULT - SUBJECTIVE AND OBJECTIVE BOX
ROSE HINES  89y  Female    HOSPITAL COURSE  89-year-old female with past medical history of hypertension, hypothyroidism, A-fib, not on blood thinners, coming from an [facility], presents to the ED after a mechanical fall with right-sided hip pain.  Patient states that she is slipped on her tile floor and fell onto her right side. After falling she was unable to get up. Remembers before and after the event.  In the ED BP was 180/83. , Afebrile. Labs significant for a wbc count of 16.51, Cr 1.1 (Baseline 0.7), BUN 27, , ALT 54, and Lipase 68. CT abdomen and pelvis showed acute, comminuted displaced right femoral intertrochanteric fracture. Went to OR to have fixed by ortho. s/p fixation.       INTERVAL HPI/OVERNIGHT EVENTS:  No complaints overnight, patient reports sleeping grossly well. Pain is well controlled.    REVIEW OF SYSTEMS:  CONSTITUTIONAL: No fever, weight loss, or fatigue  EYES: No eye pain, visual disturbances, or discharge  ENMT:  No difficulty hearing, tinnitus, vertigo; No sinus or throat pain  NECK: No pain or stiffness  RESPIRATORY: No cough, wheezing, chills or hemoptysis; No shortness of breath  CARDIOVASCULAR: No chest pain, palpitations, dizziness, or leg swelling  GASTROINTESTINAL: No abdominal or epigastric pain. No diarrhea or constipation. No nausea, vomiting, or hematemesis. No melena or hematochezia.  GENITOURINARY: No dysuria, frequency, hematuria, or incontinence  NEUROLOGICAL: No headaches, memory loss, loss of strength, numbness, or tremors  MUSCULOSKELETAL: No joint pain or swelling; No muscle, back, or extremity pain  SKIN: No itching, burning, rashes, or lesions   LYMPH NODES: No enlarged glands  ENDOCRINE: No heat or cold intolerance; No hair loss  PSYCHIATRIC: No depression, anxiety, mood swings, or difficulty sleeping  HEME/LYMPH: No easy bruising, or bleeding gums  ALLERY AND IMMUNOLOGIC: No hives or eczema    Vital Signs Last 24 Hrs  T(C): 36.8 (03 Sep 2024 07:05), Max: 36.8 (03 Sep 2024 07:05)  T(F): 98.2 (03 Sep 2024 07:05), Max: 98.2 (03 Sep 2024 07:05)  HR: 59 (03 Sep 2024 07:05) (59 - 81)  BP: 106/55 (03 Sep 2024 07:05) (106/55 - 142/58)  BP(mean): --  RR: 18 (03 Sep 2024 07:05) (18 - 18)  SpO2: 99% (03 Sep 2024 07:05) (98% - 99%)    Parameters below as of 03 Sep 2024 07:05  Patient On (Oxygen Delivery Method): room air        PHYSICAL EXAM:  GENERAL: NAD, well-groomed, well-developed  HEAD:  Atraumatic, Normocephalic  EYES: EOMI, PERRLA, conjunctiva and sclera clear  ENMT: Moist mucous membranes, Good dentition, No lesions  NECK: Supple, No JVD, Normal thyroid  NERVOUS SYSTEM:  Alert & Oriented X3, Good concentration; Motor Strength 5/5 B/L upper and lower extremities; DTRs 2+ intact and symmetric  CHEST/LUNG: Clear to auscultation bilaterally; No rales, rhonchi, wheezing, or rubs  HEART: Regular rate and rhythm; No murmurs, rubs, or gallops  ABDOMEN: Soft, Nontender, Nondistended; Bowel sounds present  EXTREMITIES:  2+ Peripheral Pulses, No clubbing, cyanosis, or edema  LYMPH: No lymphadenopathy noted  SKIN: No rashes or lesions    Consultant(s) Notes Reviewed:  [x ] YES  [ ] NO  Care Discussed with Consultants/Other Providers [ x] YES  [ ] NO    LABS:                        8.6    11.32 )-----------( 227      ( 03 Sep 2024 06:32 )             26.9     09-03    137  |  103  |  10  ----------------------------<  110<H>  3.7   |  24  |  0.5<L>    Ca    7.8<L>      03 Sep 2024 06:32  Phos  2.7     09-02  Mg     2.0     09-03    TPro  5.0<L>  /  Alb  2.8<L>  /  TBili  1.3<H>  /  DBili  x   /  AST  14  /  ALT  8   /  AlkPhos  89  09-03      Urinalysis Basic - ( 03 Sep 2024 06:32 )    Color: x / Appearance: x / SG: x / pH: x  Gluc: 110 mg/dL / Ketone: x  / Bili: x / Urobili: x   Blood: x / Protein: x / Nitrite: x   Leuk Esterase: x / RBC: x / WBC x   Sq Epi: x / Non Sq Epi: x / Bacteria: x        CAPILLARY BLOOD GLUCOSE          RADIOLOGY & ADDITIONAL TESTS:    Imaging Personally Reviewed:  [ ] YES  [ ] NO

## 2024-09-03 NOTE — PROGRESS NOTE ADULT - ASSESSMENT
89-year-old female with past medical history of hypertension, hypothyroidism, A-fib, not on blood thinners, coming from an [facility], presents to the ED after a mechanical fall with right-sided hip pain.  Patient states that she is slipped on her tile floor and fell onto her right side. After falling she was unable to get up. Remembers before and after the event.      # Acute, comminuted displaced right femoral intertrochanteric fracture sec to mechanical fall  - CT Abdomen and Pelvis w/ IV Cont (24 @ 03:16) >Acute, comminuted displaced right femoral intertrochanteric fracture  - s/p right hip ORIF with IMN on .  -  WBAT RLE  - c/w pain meds  - physiatry and PT eval, probably 4A    # Acute kidney injury, prerenal  - resolved  # Hypokalemia - resolved    # Reactive leucocytosis-resolved  # Anemia  - c/w  ferrous sulphate    # Hypotension-resolved  # Paroxysmal afib, now in SR  - hold amlodipine,  coreg  -c/w  metoprolol 25 mg q12  - not on AC  - monitor BP    # Hypothyroidism  - c/w  synthroid    # DVT prophylaxis    #  DNR/ DNI/ trial of NIV    # [facility] acute rehab vs [facility] pending updated PT treat today  # Discussed plan of care with patient  # Disposition: 4 A vs STR

## 2024-09-03 NOTE — PROGRESS NOTE ADULT - ASSESSMENT
· Assessment	  · Assessment	  89-year-old female with past medical history of hypertension, hypothyroidism, A-fib, not on blood thinners, coming from an [facility], presents to the ED after a mechanical fall with right-sided hip pain, admitted for  acute, comminuted displaced right femoral intertrochanteric fracture. s/p OR.     #Acute, comminuted displaced right femoral intertrochanteric fracture  - Patient with mechanical slip and fall   - CT demonstrating right sided fracture   - patient had operation by ortho 8/29 night  - Pain control with Morphine 2mg q6 hours prn  - tylenol 975 q8  - Pt/ot  - Hbdrop from 11.9 to 8 today  -no changes at ORIF site suggestive of hematoma or bleeding  - Hgb dropped postop but has been stable since surgery. No orthopaedic contraindication to discharge.  - Patient should be discharged on 6 weeks (from surgery date) of Aspirin 81mg BID for DVT prophylaxis as their mobility/function/ambulation will be decreased due to lower extremity orthopaedic surgery.    #UA positive  -asymptomatic  -UA shows small LE    #KASEY - reslved  - monitor BMP    #HTN  - holding home meds amlodipine and coreg    #hypothyroidism   - synthroid      #Misc  - DVT Prophylaxis: Lovenox   - Diet: DASH Diet   - Activity: IAT  - Code Status: DNR/DNI  - bowel regimen - miralax, senna

## 2024-09-03 NOTE — PROGRESS NOTE ADULT - TIME BILLING
Direct patient care. Discussed on rounds with Housestaff

## 2024-09-03 NOTE — PROGRESS NOTE ADULT - SUBJECTIVE AND OBJECTIVE BOX
Patient is a 89y old  Female who presents with a chief complaint of Hip fx (30 Aug 2024 11:39)    Patient was seen and examined.  no new complaints    PAST MEDICAL & SURGICAL HISTORY:  Hypothyroidism  HTN (hypertension)  Glaucoma  History of cataract surgery, right    Allergies  Flagyl (Other)    Intolerances  Augmentin (Diarrhea)  NSAIDs (Other)  dairy products (Diarrhea)  Cipro (Diarrhea)    MEDICATIONS  (STANDING):  enoxaparin Injectable 40 milliGRAM(s) SubCutaneous every 24 hours  ferrous    sulfate 325 milliGRAM(s) Oral daily  levothyroxine 88 MICROGram(s) Oral daily  metoprolol tartrate 25 milliGRAM(s) Oral every 12 hours  polyethylene glycol 3350 17 Gram(s) Oral every 12 hours  senna 2 Tablet(s) Oral at bedtime    MEDICATIONS  (PRN):  acetaminophen     Tablet .. 975 milliGRAM(s) Oral every 8 hours PRN Mild Pain (1 - 3)  morphine  - Injectable 2 milliGRAM(s) IV Push every 6 hours PRN Severe Pain (7 - 10)    T(C): 36.8 (09-03-24 @ 07:05), Max: 37 (09-02-24 @ 15:10)  HR: 59 (09-03-24 @ 07:05) (59 - 81)  BP: 106/55 (09-03-24 @ 07:05) (106/55 - 142/58)  RR: 18 (09-03-24 @ 07:05) (18 - 18)  SpO2: 99% (09-03-24 @ 07:05) (97% - 99%)    O/E:  Awake, alert, not in distress.  HEENT: atraumatic, EOMI.  Chest: decreased breath sounds at bases  CVS: SIS2 +, no murmur.  P/A: Soft, BS+  CNS: awake , alert  Ext: no edema feet. right hip dressing+  All systems reviewed positive findings as above.                                  8.6<L>  11.32<H> )-----------( 227      ( 03 Sep 2024 06:32 )             26.9<L>                        8.0<L>  10.63 )-----------( 176      ( 02 Sep 2024 06:08 )             24.7<L>  09-03    137  |  103  |  10  ----------------------------<  110<H>  3.7   |  24  |  0.5<L>  09-02    139  |  105  |  11  ----------------------------<  95  3.7   |  24  |  0.5<L>    Ca    7.8<L>      03 Sep 2024 06:32  Ca    7.9<L>      02 Sep 2024 06:08  Phos  2.7     09-02  Mg     2.0     09-03    TPro  5.0<L>  /  Alb  2.8<L>  /  TBili  1.3<H>  /  DBili  x   /  AST  14  /  ALT  8   /  AlkPhos  89  09-03  TPro  5.0<L>  /  Alb  3.1<L>  /  TBili  1.2  /  DBili  x   /  AST  15  /  ALT  6   /  AlkPhos  79  09-02

## 2024-09-03 NOTE — PROGRESS NOTE ADULT - SUBJECTIVE AND OBJECTIVE BOX
ORTHOPAEDIC SURGERY PROGRESS NOTE    Interval History:  Patient seen and examined at bedside.  Pain is controlled.  No complaints of chest pain, SOB, N/V.    MEDICATIONS  (STANDING):  enoxaparin Injectable 40 milliGRAM(s) SubCutaneous every 24 hours  ferrous    sulfate 325 milliGRAM(s) Oral daily  levothyroxine 88 MICROGram(s) Oral daily  metoprolol tartrate 25 milliGRAM(s) Oral every 12 hours  polyethylene glycol 3350 17 Gram(s) Oral every 12 hours  senna 2 Tablet(s) Oral at bedtime    MEDICATIONS  (PRN):  acetaminophen     Tablet .. 975 milliGRAM(s) Oral every 8 hours PRN Mild Pain (1 - 3)  morphine  - Injectable 2 milliGRAM(s) IV Push every 6 hours PRN Severe Pain (7 - 10)      Vital Signs Last 24 Hrs  T(C): 36.8 (03 Sep 2024 07:05), Max: 37 (02 Sep 2024 15:10)  T(F): 98.2 (03 Sep 2024 07:05), Max: 98.6 (02 Sep 2024 15:10)  HR: 59 (03 Sep 2024 07:05) (59 - 81)  BP: 106/55 (03 Sep 2024 07:05) (106/55 - 142/58)  BP(mean): --  RR: 18 (03 Sep 2024 07:05) (18 - 18)  SpO2: 99% (03 Sep 2024 07:05) (97% - 99%)                          8.6    11.32 )-----------( 227      ( 03 Sep 2024 06:32 )             26.9     09-03    137  |  103  |  10  ----------------------------<  110<H>  3.7   |  24  |  0.5<L>    Ca    7.8<L>      03 Sep 2024 06:32  Phos  2.7     09-02  Mg     2.0     09-03    TPro  5.0<L>  /  Alb  2.8<L>  /  TBili  1.3<H>  /  DBili  x   /  AST  14  /  ALT  8   /  AlkPhos  89  09-03      Physical Exam:   Dressing c/d/i  SILT s/s/sp/dp/t  Motor intact TA/EHL/GS  Digits wwp                  A/P: 89yF s/p right hip ORIF with IMN on 8/29. Doing well.    - Activity: WBAT RLE  - Abx: Ancef 2g q8hr x3 doses for a total of 24hrs post-operatively - completed  - DVT PPx: LVX  - Pain control as needed  - IS encouraged  - AM labs  - PT/Rehab  - D/C planning    Hgb dropped postop but has been stable since surgery. No orthopaedic contraindication to discharge.    - If discharged, patient should follow up with Dr. Monreal at [address] [place] [address]., phone [phone].  - Patient should be discharged on 6 weeks (from surgery date) of Aspirin 81mg BID for DVT prophylaxis as their mobility/function/ambulation will be decreased due to lower extremity orthopaedic surgery.

## 2024-09-04 ENCOUNTER — TRANSCRIPTION ENCOUNTER (OUTPATIENT)
Age: 89
End: 2024-09-04

## 2024-09-04 VITALS
RESPIRATION RATE: 18 BRPM | OXYGEN SATURATION: 96 % | DIASTOLIC BLOOD PRESSURE: 70 MMHG | HEART RATE: 80 BPM | SYSTOLIC BLOOD PRESSURE: 130 MMHG | TEMPERATURE: 99 F

## 2024-09-04 LAB
ALBUMIN SERPL ELPH-MCNC: 3.1 G/DL — LOW (ref 3.5–5.2)
ALP SERPL-CCNC: 100 U/L — SIGNIFICANT CHANGE UP (ref 30–115)
ALT FLD-CCNC: 8 U/L — SIGNIFICANT CHANGE UP (ref 0–41)
ANION GAP SERPL CALC-SCNC: 12 MMOL/L — SIGNIFICANT CHANGE UP (ref 7–14)
AST SERPL-CCNC: 13 U/L — SIGNIFICANT CHANGE UP (ref 0–41)
BILIRUB DIRECT SERPL-MCNC: 0.3 MG/DL — SIGNIFICANT CHANGE UP (ref 0–0.3)
BILIRUB INDIRECT FLD-MCNC: 1.1 MG/DL — SIGNIFICANT CHANGE UP (ref 0.2–1.2)
BILIRUB SERPL-MCNC: 1.4 MG/DL — HIGH (ref 0.2–1.2)
BILIRUB SERPL-MCNC: 1.4 MG/DL — HIGH (ref 0.2–1.2)
BUN SERPL-MCNC: 11 MG/DL — SIGNIFICANT CHANGE UP (ref 10–20)
CALCIUM SERPL-MCNC: 7.9 MG/DL — LOW (ref 8.4–10.5)
CHLORIDE SERPL-SCNC: 105 MMOL/L — SIGNIFICANT CHANGE UP (ref 98–110)
CO2 SERPL-SCNC: 23 MMOL/L — SIGNIFICANT CHANGE UP (ref 17–32)
CREAT SERPL-MCNC: 0.5 MG/DL — LOW (ref 0.7–1.5)
EGFR: 90 ML/MIN/1.73M2 — SIGNIFICANT CHANGE UP
GLUCOSE SERPL-MCNC: 97 MG/DL — SIGNIFICANT CHANGE UP (ref 70–99)
HCT VFR BLD CALC: 28.3 % — LOW (ref 37–47)
HGB BLD-MCNC: 8.8 G/DL — LOW (ref 12–16)
MAGNESIUM SERPL-MCNC: 2.2 MG/DL — SIGNIFICANT CHANGE UP (ref 1.8–2.4)
MCHC RBC-ENTMCNC: 30 PG — SIGNIFICANT CHANGE UP (ref 27–31)
MCHC RBC-ENTMCNC: 31.1 G/DL — LOW (ref 32–37)
MCV RBC AUTO: 96.6 FL — SIGNIFICANT CHANGE UP (ref 81–99)
NRBC # BLD: 0 /100 WBCS — SIGNIFICANT CHANGE UP (ref 0–0)
PLATELET # BLD AUTO: 252 K/UL — SIGNIFICANT CHANGE UP (ref 130–400)
PMV BLD: 11.2 FL — HIGH (ref 7.4–10.4)
POTASSIUM SERPL-MCNC: 3.5 MMOL/L — SIGNIFICANT CHANGE UP (ref 3.5–5)
POTASSIUM SERPL-SCNC: 3.5 MMOL/L — SIGNIFICANT CHANGE UP (ref 3.5–5)
PROT SERPL-MCNC: 5.4 G/DL — LOW (ref 6–8)
RBC # BLD: 2.93 M/UL — LOW (ref 4.2–5.4)
RBC # FLD: 15.2 % — HIGH (ref 11.5–14.5)
SODIUM SERPL-SCNC: 140 MMOL/L — SIGNIFICANT CHANGE UP (ref 135–146)
WBC # BLD: 11.28 K/UL — HIGH (ref 4.8–10.8)
WBC # FLD AUTO: 11.28 K/UL — HIGH (ref 4.8–10.8)

## 2024-09-04 PROCEDURE — 99239 HOSP IP/OBS DSCHRG MGMT >30: CPT

## 2024-09-04 RX ORDER — OXYCODONE HYDROCHLORIDE 5 MG/1
5 TABLET ORAL
Refills: 0 | Status: DISCONTINUED | OUTPATIENT
Start: 2024-09-04 | End: 2024-09-04

## 2024-09-04 RX ORDER — OXYCODONE HYDROCHLORIDE 5 MG/1
1 TABLET ORAL
Qty: 0 | Refills: 0 | DISCHARGE
Start: 2024-09-04 | End: 2024-09-09

## 2024-09-04 RX ORDER — METOPROLOL TARTRATE 100 MG/1
1 TABLET ORAL
Qty: 0 | Refills: 0 | DISCHARGE
Start: 2024-09-04

## 2024-09-04 RX ADMIN — METOPROLOL TARTRATE 25 MILLIGRAM(S): 100 TABLET ORAL at 17:00

## 2024-09-04 RX ADMIN — Medication 325 MILLIGRAM(S): at 12:34

## 2024-09-04 RX ADMIN — Medication 88 MICROGRAM(S): at 05:20

## 2024-09-04 RX ADMIN — METOPROLOL TARTRATE 25 MILLIGRAM(S): 100 TABLET ORAL at 05:19

## 2024-09-04 RX ADMIN — ENOXAPARIN SODIUM 40 MILLIGRAM(S): 100 INJECTION SUBCUTANEOUS at 12:35

## 2024-09-04 NOTE — DISCHARGE NOTE NURSING/CASE MANAGEMENT/SOCIAL WORK - NSDCPEFALRISK_GEN_ALL_CORE
For information on Fall & Injury Prevention, visit: [email or web address] OR  [email or web address] OR  https://www.cdc.gov/steadi/patient.html

## 2024-09-04 NOTE — PROGRESS NOTE ADULT - PROVIDER SPECIALTY LIST ADULT
Hospitalist
Internal Medicine
Orthopedics
Orthopedics
Internal Medicine
Internal Medicine
Orthopedics
Orthopedics
Internal Medicine
Orthopedics
Internal Medicine
Hospitalist

## 2024-09-04 NOTE — PROGRESS NOTE ADULT - REASON FOR ADMISSION
Hip fx

## 2024-09-04 NOTE — DISCHARGE NOTE NURSING/CASE MANAGEMENT/SOCIAL WORK - PATIENT PORTAL LINK FT
You can access the FollowMyHealth Patient Portal offered by [facility] by registering at the following website: [email or web address]. By joining [facility]’s FollowMyHealth portal, you will also be able to view your health information using other applications (apps) compatible with our system.

## 2024-09-04 NOTE — PROGRESS NOTE ADULT - ASSESSMENT
# Acute, comminuted displaced right femoral intertrochanteric fracture sec to mechanical fall  - CT Abdomen and Pelvis w/ IV Cont (08.29.24 @ 03:16) >Acute, comminuted displaced right femoral intertrochanteric fracture  - s/p right hip ORIF with IMN on 8/29.  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

## 2024-09-04 NOTE — DISCHARGE NOTE NURSING/CASE MANAGEMENT/SOCIAL WORK - HAS THE PATIENT USED TOBACCO IN THE PAST 30 DAYS?
"Goal Outcome Evaluation:      Plan of Care Reviewed With: patient    Overall Patient Progress: improvingOverall Patient Progress: improving    Outcome Evaluation: .    A&Ox4  VSS on 3 LPM NC. BIPAP at night and naps. Had BIPAP on until late morning/early afternoon  Denies pain  Activity: A1 walker, refuses belt. Up in chair for lunch  Consults: RT  Pending TCU placement for IV antibiotics       Problem: Adult Inpatient Plan of Care  Goal: Plan of Care Review  Description: The Plan of Care Review/Shift note should be completed every shift.  The Outcome Evaluation is a brief statement about your assessment that the patient is improving, declining, or no change.  This information will be displayed automatically on your shift  note.  Outcome: Progressing  Flowsheets (Taken 12/2/2023 1359)  Outcome Evaluation: .  Plan of Care Reviewed With: patient  Overall Patient Progress: improving  Goal: Patient-Specific Goal (Individualized)  Description: You can add care plan individualizations to a care plan. Examples of Individualization might be:  \"Parent requests to be called daily at 9am for status\", \"I have a hard time hearing out of my right ear\", or \"Do not touch me to wake me up as it startles  me\".  Outcome: Progressing  Goal: Absence of Hospital-Acquired Illness or Injury  Outcome: Progressing  Intervention: Identify and Manage Fall Risk  Recent Flowsheet Documentation  Taken 12/2/2023 1246 by Karla Sanchez, RN  Safety Promotion/Fall Prevention: safety round/check completed  Taken 12/2/2023 1030 by Karla Sanchez, RN  Safety Promotion/Fall Prevention:   assistive device/personal items within reach   clutter free environment maintained   increased rounding and observation   increase visualization of patient   lighting adjusted   mobility aid in reach   nonskid shoes/slippers when out of bed   patient and family education   room door open   room organization consistent   safety round/check completed   supervised " activity   treat underlying cause  Taken 12/2/2023 0730 by Karla Sanchez, RN  Safety Promotion/Fall Prevention: safety round/check completed  Intervention: Prevent Skin Injury  Recent Flowsheet Documentation  Taken 12/2/2023 1030 by Karla Sanchez RN  Body Position: refuses positioning  Intervention: Prevent Infection  Recent Flowsheet Documentation  Taken 12/2/2023 1030 by Karla Sanchez, RN  Infection Prevention:   hand hygiene promoted   equipment surfaces disinfected  Goal: Optimal Comfort and Wellbeing  Outcome: Progressing  Goal: Readiness for Transition of Care  Outcome: Progressing     Problem: Gas Exchange Impaired  Goal: Optimal Gas Exchange  Outcome: Progressing  Intervention: Optimize Oxygenation and Ventilation  Recent Flowsheet Documentation  Taken 12/2/2023 1030 by Karla Sanchez, RN  Head of Bed (HOB) Positioning: HOB at 20-30 degrees  Taken 12/2/2023 0730 by Karla Sanchez, RN  Head of Bed (HOB) Positioning: HOB at 30-45 degrees     Problem: Infection  Goal: Absence of Infection Signs and Symptoms  Outcome: Progressing      No

## 2024-09-04 NOTE — PROGRESS NOTE ADULT - SUBJECTIVE AND OBJECTIVE BOX
HPI  Patient is a 89y old Female who presents with a chief complaint of Hip fx (03 Sep 2024 15:40)    Currently admitted to medicine with the primary diagnosis of Hip fracture       Today is hospital day 6d.     INTERVAL HPI / OVERNIGHT EVENTS:  Patient was seen and examined at bedside  pain is controlled  Denies any complains of chest pain or shortness of breath  Denies any abdominal pain/nausea/vomiting        PAST MEDICAL & SURGICAL HISTORY  Hypothyroidism    HTN (hypertension)    Glaucoma    History of cataract surgery, right      ALLERGIES  Flagyl (Other)    MEDICATIONS  STANDING MEDICATIONS  enoxaparin Injectable 40 milliGRAM(s) SubCutaneous every 24 hours  ferrous    sulfate 325 milliGRAM(s) Oral daily  levothyroxine 88 MICROGram(s) Oral daily  metoprolol tartrate 25 milliGRAM(s) Oral every 12 hours  polyethylene glycol 3350 17 Gram(s) Oral every 12 hours  senna 2 Tablet(s) Oral at bedtime    PRN MEDICATIONS  acetaminophen     Tablet .. 975 milliGRAM(s) Oral every 8 hours PRN  oxyCODONE    IR 5 milliGRAM(s) Oral four times a day PRN    VITALS:  T(F): 99.1  HR: 80  BP: 130/70  RR: 18  SpO2: 96%    PHYSICAL EXAM  GEN: no distress, comfortable  PULM: BS heard b/l equal, No wheezing  CVS: S1S2 present, no rubs or gallops  ABD: Soft, non-distended, no guarding; non-tender  EXT: No lower extremity edema  NEURO: A&Ox3    LABS                        8.8    11.28 )-----------( 252      ( 04 Sep 2024 07:05 )             28.3     09-04    140  |  105  |  11  ----------------------------<  97  3.5   |  23  |  0.5<L>    Ca    7.9<L>      04 Sep 2024 07:05  Mg     2.2     09-04    TPro  x   /  Alb  x   /  TBili  1.4<H>  /  DBili  0.3  /  AST  x   /  ALT  x   /  AlkPhos  x   09-04      Urinalysis Basic - ( 04 Sep 2024 07:05 )    Color: x / Appearance: x / SG: x / pH: x  Gluc: 97 mg/dL / Ketone: x  / Bili: x / Urobili: x   Blood: x / Protein: x / Nitrite: x   Leuk Esterase: x / RBC: x / WBC x   Sq Epi: x / Non Sq Epi: x / Bacteria: x                RADIOLOGY
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
## 2024-09-10 DIAGNOSIS — Z88.1 ALLERGY STATUS TO OTHER ANTIBIOTIC AGENTS: ICD-10-CM

## 2024-09-10 DIAGNOSIS — Z88.6 ALLERGY STATUS TO ANALGESIC AGENT: ICD-10-CM

## 2024-09-10 DIAGNOSIS — I10 ESSENTIAL (PRIMARY) HYPERTENSION: ICD-10-CM

## 2024-09-10 DIAGNOSIS — Z98.41 CATARACT EXTRACTION STATUS, RIGHT EYE: ICD-10-CM

## 2024-09-10 DIAGNOSIS — D64.9 ANEMIA, UNSPECIFIED: ICD-10-CM

## 2024-09-10 DIAGNOSIS — S72.141A DISPLACED INTERTROCHANTERIC FRACTURE OF RIGHT FEMUR, INITIAL ENCOUNTER FOR CLOSED FRACTURE: ICD-10-CM

## 2024-09-10 DIAGNOSIS — E87.6 HYPOKALEMIA: ICD-10-CM

## 2024-09-10 DIAGNOSIS — I48.0 PAROXYSMAL ATRIAL FIBRILLATION: ICD-10-CM

## 2024-09-10 DIAGNOSIS — M81.0 AGE-RELATED OSTEOPOROSIS WITHOUT CURRENT PATHOLOGICAL FRACTURE: ICD-10-CM

## 2024-09-10 DIAGNOSIS — E80.4 GILBERT SYNDROME: ICD-10-CM

## 2024-09-10 DIAGNOSIS — Z79.899 OTHER LONG TERM (CURRENT) DRUG THERAPY: ICD-10-CM
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
## 2024-09-10 DIAGNOSIS — H40.9 UNSPECIFIED GLAUCOMA: ICD-10-CM

## 2024-09-10 DIAGNOSIS — N17.9 ACUTE KIDNEY FAILURE, UNSPECIFIED: ICD-10-CM

## 2024-09-10 DIAGNOSIS — Z91.011 ALLERGY TO MILK PRODUCTS: ICD-10-CM

## 2024-09-10 DIAGNOSIS — K22.70 BARRETT'S ESOPHAGUS WITHOUT DYSPLASIA: ICD-10-CM

## 2024-09-10 DIAGNOSIS — Z66 DO NOT RESUSCITATE: ICD-10-CM

## 2024-09-10 DIAGNOSIS — I95.9 HYPOTENSION, UNSPECIFIED: ICD-10-CM

## 2024-09-10 DIAGNOSIS — Z79.890 HORMONE REPLACEMENT THERAPY: ICD-10-CM

## 2024-09-10 DIAGNOSIS — E03.9 HYPOTHYROIDISM, UNSPECIFIED: ICD-10-CM

## 2024-10-03 ENCOUNTER — APPOINTMENT (OUTPATIENT)
Dept: ORTHOPEDIC SURGERY | Facility: ASSISTED LIVING FACILITY | Age: 89
End: 2024-10-03

## 2024-10-03 PROCEDURE — 99024 POSTOP FOLLOW-UP VISIT: CPT

## 2024-10-17 ENCOUNTER — APPOINTMENT (OUTPATIENT)
Dept: ORTHOPEDIC SURGERY | Facility: ASSISTED LIVING FACILITY | Age: 89
End: 2024-10-17
Payer: MEDICARE

## 2024-10-17 PROCEDURE — 99024 POSTOP FOLLOW-UP VISIT: CPT

## (undated) DEVICE — FORCEP RADIAL JAW 4 W NDL 2.2MM 2.8MM 240CM ORANGE DISP

## (undated) DEVICE — BRUSH CYTOLOGY MASTER V